# Patient Record
Sex: FEMALE | Race: BLACK OR AFRICAN AMERICAN | NOT HISPANIC OR LATINO | URBAN - METROPOLITAN AREA
[De-identification: names, ages, dates, MRNs, and addresses within clinical notes are randomized per-mention and may not be internally consistent; named-entity substitution may affect disease eponyms.]

---

## 2017-01-24 ENCOUNTER — OUTPATIENT (OUTPATIENT)
Dept: OUTPATIENT SERVICES | Facility: HOSPITAL | Age: 27
LOS: 1 days | Discharge: HOME | End: 2017-01-24

## 2017-01-30 ENCOUNTER — APPOINTMENT (OUTPATIENT)
Dept: GASTROENTEROLOGY | Facility: CLINIC | Age: 27
End: 2017-01-30

## 2017-06-27 DIAGNOSIS — R13.12 DYSPHAGIA, OROPHARYNGEAL PHASE: ICD-10-CM

## 2017-07-31 ENCOUNTER — OUTPATIENT (OUTPATIENT)
Dept: OUTPATIENT SERVICES | Facility: HOSPITAL | Age: 27
LOS: 1 days | Discharge: HOME | End: 2017-07-31

## 2017-07-31 DIAGNOSIS — Z79.899 OTHER LONG TERM (CURRENT) DRUG THERAPY: ICD-10-CM

## 2017-07-31 DIAGNOSIS — E55.9 VITAMIN D DEFICIENCY, UNSPECIFIED: ICD-10-CM

## 2017-08-04 ENCOUNTER — OUTPATIENT (OUTPATIENT)
Dept: OUTPATIENT SERVICES | Facility: HOSPITAL | Age: 27
LOS: 1 days | Discharge: HOME | End: 2017-08-04

## 2017-10-20 ENCOUNTER — OUTPATIENT (OUTPATIENT)
Dept: OUTPATIENT SERVICES | Facility: HOSPITAL | Age: 27
LOS: 1 days | Discharge: HOME | End: 2017-10-20

## 2017-10-20 DIAGNOSIS — E55.9 VITAMIN D DEFICIENCY, UNSPECIFIED: ICD-10-CM

## 2017-10-20 DIAGNOSIS — Z79.899 OTHER LONG TERM (CURRENT) DRUG THERAPY: ICD-10-CM

## 2017-11-02 ENCOUNTER — EMERGENCY (EMERGENCY)
Facility: HOSPITAL | Age: 27
LOS: 0 days | Discharge: HOME | End: 2017-11-02
Admitting: INTERNAL MEDICINE

## 2017-11-02 DIAGNOSIS — S00.83XA CONTUSION OF OTHER PART OF HEAD, INITIAL ENCOUNTER: ICD-10-CM
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
## 2017-11-09 DIAGNOSIS — S00.83XA CONTUSION OF OTHER PART OF HEAD, INITIAL ENCOUNTER: ICD-10-CM

## 2017-11-09 DIAGNOSIS — Z79.899 OTHER LONG TERM (CURRENT) DRUG THERAPY: ICD-10-CM
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
## 2017-11-09 DIAGNOSIS — R47.01 APHASIA: ICD-10-CM

## 2018-01-11 ENCOUNTER — OUTPATIENT (OUTPATIENT)
Dept: OUTPATIENT SERVICES | Facility: HOSPITAL | Age: 28
LOS: 1 days | Discharge: HOME | End: 2018-01-11

## 2018-01-11 DIAGNOSIS — N39.0 URINARY TRACT INFECTION, SITE NOT SPECIFIED: ICD-10-CM

## 2018-01-11 DIAGNOSIS — Z79.899 OTHER LONG TERM (CURRENT) DRUG THERAPY: ICD-10-CM

## 2018-01-11 DIAGNOSIS — E55.9 VITAMIN D DEFICIENCY, UNSPECIFIED: ICD-10-CM

## 2018-01-11 DIAGNOSIS — Z13.220 ENCOUNTER FOR SCREENING FOR LIPOID DISORDERS: ICD-10-CM

## 2018-01-17 ENCOUNTER — OUTPATIENT (OUTPATIENT)
Dept: OUTPATIENT SERVICES | Facility: HOSPITAL | Age: 28
LOS: 1 days | Discharge: HOME | End: 2018-01-17

## 2018-01-17 DIAGNOSIS — N39.0 URINARY TRACT INFECTION, SITE NOT SPECIFIED: ICD-10-CM

## 2018-01-18 ENCOUNTER — OUTPATIENT (OUTPATIENT)
Dept: OUTPATIENT SERVICES | Facility: HOSPITAL | Age: 28
LOS: 1 days | Discharge: HOME | End: 2018-01-18

## 2018-01-18 DIAGNOSIS — R79.89 OTHER SPECIFIED ABNORMAL FINDINGS OF BLOOD CHEMISTRY: ICD-10-CM

## 2018-01-28 ENCOUNTER — OUTPATIENT (OUTPATIENT)
Dept: OUTPATIENT SERVICES | Facility: HOSPITAL | Age: 28
LOS: 1 days | Discharge: HOME | End: 2018-01-28

## 2018-01-28 DIAGNOSIS — Z79.899 OTHER LONG TERM (CURRENT) DRUG THERAPY: ICD-10-CM

## 2018-01-28 DIAGNOSIS — Z13.29 ENCOUNTER FOR SCREENING FOR OTHER SUSPECTED ENDOCRINE DISORDER: ICD-10-CM

## 2018-04-13 ENCOUNTER — APPOINTMENT (OUTPATIENT)
Dept: OTOLARYNGOLOGY | Facility: CLINIC | Age: 28
End: 2018-04-13
Payer: MEDICAID

## 2018-04-13 VITALS — SYSTOLIC BLOOD PRESSURE: 133 MMHG | DIASTOLIC BLOOD PRESSURE: 85 MMHG

## 2018-04-13 DIAGNOSIS — Q18.1 PREAURICULAR SINUS AND CYST: ICD-10-CM

## 2018-04-13 DIAGNOSIS — F98.4 STEREOTYPED MOVEMENT DISORDERS: ICD-10-CM

## 2018-04-13 DIAGNOSIS — H54.7 UNSPECIFIED VISUAL LOSS: ICD-10-CM

## 2018-04-13 DIAGNOSIS — E22.9 HYPERFUNCTION OF PITUITARY GLAND, UNSPECIFIED: ICD-10-CM

## 2018-04-13 PROCEDURE — 99203 OFFICE O/P NEW LOW 30 MIN: CPT

## 2018-05-16 ENCOUNTER — OUTPATIENT (OUTPATIENT)
Dept: OUTPATIENT SERVICES | Facility: HOSPITAL | Age: 28
LOS: 1 days | Discharge: HOME | End: 2018-05-16

## 2018-11-30 ENCOUNTER — OUTPATIENT (OUTPATIENT)
Dept: OUTPATIENT SERVICES | Facility: HOSPITAL | Age: 28
LOS: 1 days | Discharge: HOME | End: 2018-11-30

## 2018-11-30 DIAGNOSIS — Z79.899 OTHER LONG TERM (CURRENT) DRUG THERAPY: ICD-10-CM

## 2018-11-30 DIAGNOSIS — E55.9 VITAMIN D DEFICIENCY, UNSPECIFIED: ICD-10-CM

## 2019-01-22 ENCOUNTER — OUTPATIENT (OUTPATIENT)
Dept: OUTPATIENT SERVICES | Facility: HOSPITAL | Age: 29
LOS: 1 days | Discharge: HOME | End: 2019-01-22

## 2019-01-24 DIAGNOSIS — Z01.20 ENCOUNTER FOR DENTAL EXAMINATION AND CLEANING WITHOUT ABNORMAL FINDINGS: ICD-10-CM

## 2019-02-17 ENCOUNTER — EMERGENCY (EMERGENCY)
Facility: HOSPITAL | Age: 29
LOS: 0 days | Discharge: HOME | End: 2019-02-17
Attending: EMERGENCY MEDICINE | Admitting: EMERGENCY MEDICINE

## 2019-02-17 VITALS
RESPIRATION RATE: 16 BRPM | DIASTOLIC BLOOD PRESSURE: 60 MMHG | TEMPERATURE: 98 F | HEART RATE: 70 BPM | SYSTOLIC BLOOD PRESSURE: 100 MMHG | OXYGEN SATURATION: 100 %

## 2019-02-17 VITALS
DIASTOLIC BLOOD PRESSURE: 51 MMHG | HEART RATE: 68 BPM | OXYGEN SATURATION: 100 % | WEIGHT: 119.05 LBS | RESPIRATION RATE: 16 BRPM | SYSTOLIC BLOOD PRESSURE: 96 MMHG | HEIGHT: 62 IN | TEMPERATURE: 98 F

## 2019-02-17 DIAGNOSIS — K46.9 UNSPECIFIED ABDOMINAL HERNIA WITHOUT OBSTRUCTION OR GANGRENE: ICD-10-CM

## 2019-02-17 DIAGNOSIS — R19.8 OTHER SPECIFIED SYMPTOMS AND SIGNS INVOLVING THE DIGESTIVE SYSTEM AND ABDOMEN: ICD-10-CM

## 2019-02-17 NOTE — ED PROVIDER NOTE - NS ED ROS FT
Except as documented in HPI, all other ROS negative.   Hx obtained from staff member at bedside.   GENERAL: Denies fever/chills, loss of appetite/weight or fatigue.  SKIN: Denies rashes, abrasions, lacerations, ecchymosis, erythema, or edema.  CARDIAC: Denies chest pain, palpitations, or SOB.   RESPIRATORY: Denies SOB, cough, hemoptysis or wheezing.   GI: + stool coming from vagina.   : Denies hematuria, dysuria or frequency.

## 2019-02-17 NOTE — ED PROVIDER NOTE - CARE PROVIDER_API CALL
Rod Morejon)  ColonRectal Surgery  [address] [place] [address], [address] [place] [address]  Phone: [phone]  Fax: [phone]  Follow Up Time: 1-3 Days

## 2019-02-17 NOTE — ED PROVIDER NOTE - OBJECTIVE STATEMENT
Pt is a 29 y/o Female, PMHX of MR, developmental delay, seizures, presents to ED sent in from group home accompanied by aide for evaluation of stool coming from the vagina. As per staff, they just noticed it prior to coming in when they were changing and cleaning her. They wiped, and noticed stool in the vagina. No fevers, abdominal surgeries or n/v or change in appetite.

## 2019-02-17 NOTE — ED PROVIDER NOTE - CLINICAL SUMMARY MEDICAL DECISION MAKING FREE TEXT BOX
pt with possible rectovaginal fistula, exam limited by copious soft brown stool in diaper, pt at baseline, abd +bs, snt/nd, rectal temp 98.8, has stool rectal vault wtihout masses, no palpable defect in vaginal vault though again exam difficult as feces everywhere, will d/c to f/u with surgery as outpatient for further workup. Group home counseled regarding conditions which should prompt return.

## 2019-02-17 NOTE — ED ADULT NURSE NOTE - NSIMPLEMENTINTERV_GEN_ALL_ED
Implemented All Universal Safety Interventions:  [place] to call system. Call bell, personal items and telephone within reach. Instruct patient to call for assistance. Room bathroom lighting operational. Non-slip footwear when patient is off stretcher. Physically safe environment: no spills, clutter or unnecessary equipment. Stretcher in lowest position, wheels locked, appropriate side rails in place.

## 2019-02-17 NOTE — ED PROVIDER NOTE - NSFOLLOWUPCLINICS_GEN_ALL_ED_FT
[facility]  Surgery  [address] [place] [address]  Phone: [phone]  Fax:   Follow Up Time: 1-3 Days

## 2019-02-17 NOTE — ED PROVIDER NOTE - PHYSICAL EXAMINATION
VITAL SIGNS: I have reviewed the initial vital signs.   CONSTITUTIONAL: Awake, alert. Well-developed; well-nourished; in no distress. Non-toxic appearing.   SKIN: No rash, vesicles/lesion, abrasions or lacerations. No ecchymosis or signs of trauma.   HEAD: Normocephalic; atraumatic.   CARD: No chest wall deformity or tenderness. S1, S2 normal; no murmurs, gallops, or rubs. Regular rate and rhythm.  RESP: Good air movement. Lungs CTAB. No crackles, wheezes, rales or rhonchi.  ABD: Soft; non-distended; non-tender. + hernia. Soft stool in diaper/on skin of rectum. No rebound/guarding/rigidity.

## 2019-02-17 NOTE — ED PROVIDER NOTE - PROGRESS NOTE DETAILS
Discussed with nurse need for surgical outpatient f/u to r/o rectovaginal fistula. verbalized, understanding, pt is able to make doctor's appointment. Will provide surgical info for f/u.

## 2019-02-19 ENCOUNTER — CHART COPY (OUTPATIENT)
Age: 29
End: 2019-02-19

## 2019-03-26 ENCOUNTER — APPOINTMENT (OUTPATIENT)
Dept: SURGERY | Facility: CLINIC | Age: 29
End: 2019-03-26

## 2019-03-26 ENCOUNTER — APPOINTMENT (OUTPATIENT)
Dept: SURGERY | Facility: CLINIC | Age: 29
End: 2019-03-26
Payer: MEDICAID

## 2019-03-26 VITALS
WEIGHT: 110 LBS | DIASTOLIC BLOOD PRESSURE: 64 MMHG | BODY MASS INDEX: 20.24 KG/M2 | SYSTOLIC BLOOD PRESSURE: 102 MMHG | HEIGHT: 62 IN

## 2019-03-26 DIAGNOSIS — N82.3 FISTULA OF VAGINA TO LARGE INTESTINE: ICD-10-CM

## 2019-03-26 PROCEDURE — 99203 OFFICE O/P NEW LOW 30 MIN: CPT

## 2019-05-06 PROBLEM — N82.3 RECTOVAGINAL FISTULA: Status: ACTIVE | Noted: 2019-05-06

## 2019-05-06 NOTE — HISTORY OF PRESENT ILLNESS
[FreeTextEntry1] : This is an initial patient visit for Ms. HERNANDEZ who is a nonverbal resident of a nursing care facility. She was seen and evaluated in the ED after someone at the nursing care facility noted the patient have a single episode of stool per vagina. The patient had no other symptoms, had no history of antecedent UTI or vaginal infection.

## 2019-05-06 NOTE — PHYSICAL EXAM
[Agitated] : agitated [Alert] : not alert [Oriented to Person] : disoriented to person [Oriented to Place] : disoriented to place [Oriented to Time] : disoriented to time [de-identified] : Soft, Non-tender, Non-distended, no guarding or rebound. [de-identified] : Healthy female, NAD [de-identified] : Wheelchair bound

## 2019-05-06 NOTE — ASSESSMENT
[FreeTextEntry1] : Ms. HERNANDEZ had one report of stool per vagina. She has no other symptoms or history. I doubt that she has a rectovaginal fistula at this time. I will hold off on and advanced to a workup for now. If she has any further episodes of stool per vagina I will embark on a advance work up which will require likely in-hospital evaluation since she is nonverbal and will require a fair amount of sedation for any testing.

## 2019-06-04 ENCOUNTER — EMERGENCY (EMERGENCY)
Facility: HOSPITAL | Age: 29
LOS: 0 days | Discharge: HOME | End: 2019-06-04
Attending: EMERGENCY MEDICINE | Admitting: EMERGENCY MEDICINE
Payer: MEDICAID

## 2019-06-04 VITALS
TEMPERATURE: 98 F | DIASTOLIC BLOOD PRESSURE: 53 MMHG | SYSTOLIC BLOOD PRESSURE: 89 MMHG | HEART RATE: 68 BPM | OXYGEN SATURATION: 99 % | RESPIRATION RATE: 18 BRPM

## 2019-06-04 VITALS
SYSTOLIC BLOOD PRESSURE: 95 MMHG | RESPIRATION RATE: 18 BRPM | DIASTOLIC BLOOD PRESSURE: 58 MMHG | HEART RATE: 62 BPM | TEMPERATURE: 98 F

## 2019-06-04 DIAGNOSIS — R56.9 UNSPECIFIED CONVULSIONS: ICD-10-CM

## 2019-06-04 DIAGNOSIS — G40.909 EPILEPSY, UNSPECIFIED, NOT INTRACTABLE, WITHOUT STATUS EPILEPTICUS: ICD-10-CM

## 2019-06-04 LAB
ALBUMIN SERPL ELPH-MCNC: 3.7 G/DL — SIGNIFICANT CHANGE UP (ref 3.5–5.2)
ALP SERPL-CCNC: 48 U/L — SIGNIFICANT CHANGE UP (ref 30–115)
ALT FLD-CCNC: 18 U/L — SIGNIFICANT CHANGE UP (ref 0–41)
ANION GAP SERPL CALC-SCNC: 12 MMOL/L — SIGNIFICANT CHANGE UP (ref 7–14)
AST SERPL-CCNC: 14 U/L — SIGNIFICANT CHANGE UP (ref 0–41)
BASOPHILS # BLD AUTO: 0.09 K/UL — SIGNIFICANT CHANGE UP (ref 0–0.2)
BASOPHILS NFR BLD AUTO: 0.9 % — SIGNIFICANT CHANGE UP (ref 0–1)
BILIRUB SERPL-MCNC: 0.3 MG/DL — SIGNIFICANT CHANGE UP (ref 0.2–1.2)
BUN SERPL-MCNC: 10 MG/DL — SIGNIFICANT CHANGE UP (ref 10–20)
CALCIUM SERPL-MCNC: 9 MG/DL — SIGNIFICANT CHANGE UP (ref 8.5–10.1)
CHLORIDE SERPL-SCNC: 107 MMOL/L — SIGNIFICANT CHANGE UP (ref 98–110)
CO2 SERPL-SCNC: 19 MMOL/L — SIGNIFICANT CHANGE UP (ref 17–32)
CREAT SERPL-MCNC: 0.6 MG/DL — LOW (ref 0.7–1.5)
EOSINOPHIL # BLD AUTO: 0.25 K/UL — SIGNIFICANT CHANGE UP (ref 0–0.7)
EOSINOPHIL NFR BLD AUTO: 2.6 % — SIGNIFICANT CHANGE UP (ref 0–8)
GLUCOSE SERPL-MCNC: 90 MG/DL — SIGNIFICANT CHANGE UP (ref 70–99)
HCT VFR BLD CALC: 34.2 % — LOW (ref 37–47)
HGB BLD-MCNC: 11.2 G/DL — LOW (ref 12–16)
IMM GRANULOCYTES NFR BLD AUTO: 0.4 % — HIGH (ref 0.1–0.3)
LYMPHOCYTES # BLD AUTO: 3.1 K/UL — SIGNIFICANT CHANGE UP (ref 1.2–3.4)
LYMPHOCYTES # BLD AUTO: 32.4 % — SIGNIFICANT CHANGE UP (ref 20.5–51.1)
MCHC RBC-ENTMCNC: 27.7 PG — SIGNIFICANT CHANGE UP (ref 27–31)
MCHC RBC-ENTMCNC: 32.7 G/DL — SIGNIFICANT CHANGE UP (ref 32–37)
MCV RBC AUTO: 84.4 FL — SIGNIFICANT CHANGE UP (ref 81–99)
MONOCYTES # BLD AUTO: 1.27 K/UL — HIGH (ref 0.1–0.6)
MONOCYTES NFR BLD AUTO: 13.3 % — HIGH (ref 1.7–9.3)
NEUTROPHILS # BLD AUTO: 4.82 K/UL — SIGNIFICANT CHANGE UP (ref 1.4–6.5)
NEUTROPHILS NFR BLD AUTO: 50.4 % — SIGNIFICANT CHANGE UP (ref 42.2–75.2)
NRBC # BLD: 0 /100 WBCS — SIGNIFICANT CHANGE UP (ref 0–0)
PLATELET # BLD AUTO: 279 K/UL — SIGNIFICANT CHANGE UP (ref 130–400)
POTASSIUM SERPL-MCNC: 4.6 MMOL/L — SIGNIFICANT CHANGE UP (ref 3.5–5)
POTASSIUM SERPL-SCNC: 4.6 MMOL/L — SIGNIFICANT CHANGE UP (ref 3.5–5)
PROT SERPL-MCNC: 6.5 G/DL — SIGNIFICANT CHANGE UP (ref 6–8)
RBC # BLD: 4.05 M/UL — LOW (ref 4.2–5.4)
RBC # FLD: 12.4 % — SIGNIFICANT CHANGE UP (ref 11.5–14.5)
SODIUM SERPL-SCNC: 138 MMOL/L — SIGNIFICANT CHANGE UP (ref 135–146)
WBC # BLD: 9.57 K/UL — SIGNIFICANT CHANGE UP (ref 4.8–10.8)
WBC # FLD AUTO: 9.57 K/UL — SIGNIFICANT CHANGE UP (ref 4.8–10.8)

## 2019-06-04 PROCEDURE — 99284 EMERGENCY DEPT VISIT MOD MDM: CPT

## 2019-06-04 NOTE — ED PROVIDER NOTE - ATTENDING CONTRIBUTION TO CARE
28 y/o female with h/o MR, seizures, non-verbal, sent to ER from living facility for eval of seizures.  Per aide, pt had arm shaking and was yelling - has frequent similar episodes as well as behavioural outburst, today was more than usual and she bit her lip so was sent to ER for eval. No recent illness, no f/c.  Per aide, pt currently at her baseline.  PE - nad, eomi, superficial lac to upper mid lip, no c-spine tenderness, cta b/l, no w/r/r, rrr, abd- soft, nt/nd, flexion contractures, no tenderness, awake, non-verbal.

## 2019-06-04 NOTE — ED PROVIDER NOTE - CARE PROVIDER_API CALL
Joan Fernandez (MD)  Medicine  [address] [place] [id number]  Phone: [phone]  Fax: [phone]  Follow Up Time: 1-3 Days

## 2019-06-04 NOTE — ED PROVIDER NOTE - OBJECTIVE STATEMENT
29y F w PMH severe MR, nonverbal, and seizure disorder on keppra BIBEMS after multiple witnessed episodes of shaking earlier today. Pt has behavioral outbursts and has a seizure history. 29y F w PMH severe MR, nonverbal, and seizure disorder on keppra BIBEMS after multiple witnessed episodes of shaking earlier today. Pt has behavioral outbursts and has a seizure history. Per the aide at the bedside, pt has similar outbursts every day, but had more today than usual. Episodes were each 5-10 seconds long, associated with yelling and arm shaking. Pt has since returned to baseline. Per the aide, there was another episode in the waiting room during which the patient still responded to the aide.

## 2019-06-04 NOTE — ED ADULT TRIAGE NOTE - CHIEF COMPLAINT QUOTE
as per family member, patient had 2 episode of seizure activity, has hx of seizure, no acute distress at this time, response to voice stimuli.

## 2019-06-04 NOTE — ED PROVIDER NOTE - PENDING LAB RAD OPT OUT
Exclude Pending Lab and Radiology orders from printing on the Patient's Discharge Instructions, due to Privacy Concerns. positive S2/positive S1

## 2019-06-04 NOTE — ED ADULT NURSE NOTE - OBJECTIVE STATEMENT
As per family member patient has 2 seizure episodes this am. Patient has hx of seizures and MR. Nonverbal at baseline.

## 2019-06-04 NOTE — ED PROVIDER NOTE - CLINICAL SUMMARY MEDICAL DECISION MAKING FREE TEXT BOX
Pt with h/o MR/seizures, sent to ER for eval of seizure at living facility. pt non-verbal, at baseline MS per aide.   pt on keppra.  labs ok.  no seizure activity while in ER.  TO d/c back to living facility, and pt to f/u with pmd, Dr. Franco.

## 2019-06-04 NOTE — ED PROVIDER NOTE - PHYSICAL EXAMINATION
Constitutional: Chronically ill with flexion contractures.   Head: Atraumatic.  Eyes: EOMI without discomfort.   ENT: No nasal discharge. Mucous membranes moist. Superficial laceration to upper, inner lip.   Neck: Supple. Painless ROM.  Cardiovascular: Regular rhythm. Regular rate. Normal S1 and S2. No murmurs. 2+ pulses in all extremities.   Pulmonary: Normal respiratory rate and effort. Lungs clear to auscultation bilaterally. No wheezing, rales, or rhonchi. Bilateral, equal lung expansion.   Abdominal: Soft. Nondistended. Nontender.   Extremities: Pelvis stable. Moving all extremities.   Skin: No rashes.   Neuro: Awake and alert. Interactive.   Psych: Severe MR nonverbal.

## 2019-10-17 ENCOUNTER — OUTPATIENT (OUTPATIENT)
Dept: OUTPATIENT SERVICES | Facility: HOSPITAL | Age: 29
LOS: 1 days | Discharge: HOME | End: 2019-10-17

## 2019-10-18 DIAGNOSIS — Z13.29 ENCOUNTER FOR SCREENING FOR OTHER SUSPECTED ENDOCRINE DISORDER: ICD-10-CM

## 2019-10-18 DIAGNOSIS — Z79.899 OTHER LONG TERM (CURRENT) DRUG THERAPY: ICD-10-CM

## 2019-10-18 DIAGNOSIS — E03.9 HYPOTHYROIDISM, UNSPECIFIED: ICD-10-CM

## 2019-10-18 DIAGNOSIS — D50.9 IRON DEFICIENCY ANEMIA, UNSPECIFIED: ICD-10-CM

## 2019-10-18 DIAGNOSIS — D51.9 VITAMIN B12 DEFICIENCY ANEMIA, UNSPECIFIED: ICD-10-CM

## 2019-10-18 DIAGNOSIS — E55.9 VITAMIN D DEFICIENCY, UNSPECIFIED: ICD-10-CM

## 2019-10-18 PROBLEM — R56.9 UNSPECIFIED CONVULSIONS: Chronic | Status: ACTIVE | Noted: 2019-06-04

## 2019-10-22 ENCOUNTER — FORM ENCOUNTER (OUTPATIENT)
Age: 29
End: 2019-10-22

## 2019-10-23 ENCOUNTER — OUTPATIENT (OUTPATIENT)
Dept: OUTPATIENT SERVICES | Facility: HOSPITAL | Age: 29
LOS: 1 days | Discharge: HOME | End: 2019-10-23
Payer: MEDICAID

## 2019-10-23 DIAGNOSIS — S02.2XXA FRACTURE OF NASAL BONES, INITIAL ENCOUNTER FOR CLOSED FRACTURE: ICD-10-CM

## 2019-10-23 PROCEDURE — 70160 X-RAY EXAM OF NASAL BONES: CPT | Mod: 26

## 2019-11-22 ENCOUNTER — OUTPATIENT (OUTPATIENT)
Dept: OUTPATIENT SERVICES | Facility: HOSPITAL | Age: 29
LOS: 1 days | Discharge: HOME | End: 2019-11-22

## 2019-11-22 DIAGNOSIS — Z01.20 ENCOUNTER FOR DENTAL EXAMINATION AND CLEANING WITHOUT ABNORMAL FINDINGS: ICD-10-CM

## 2020-02-11 ENCOUNTER — FORM ENCOUNTER (OUTPATIENT)
Age: 30
End: 2020-02-11

## 2020-02-12 ENCOUNTER — OUTPATIENT (OUTPATIENT)
Dept: OUTPATIENT SERVICES | Facility: HOSPITAL | Age: 30
LOS: 1 days | Discharge: HOME | End: 2020-02-12
Payer: MEDICAID

## 2020-02-12 DIAGNOSIS — S09.92XA UNSPECIFIED INJURY OF NOSE, INITIAL ENCOUNTER: ICD-10-CM

## 2020-02-12 PROCEDURE — 70160 X-RAY EXAM OF NASAL BONES: CPT | Mod: 26

## 2020-09-10 ENCOUNTER — OUTPATIENT (OUTPATIENT)
Dept: OUTPATIENT SERVICES | Facility: HOSPITAL | Age: 30
LOS: 1 days | Discharge: HOME | End: 2020-09-10

## 2021-02-25 ENCOUNTER — EMERGENCY (EMERGENCY)
Facility: HOSPITAL | Age: 31
LOS: 0 days | Discharge: HOME | End: 2021-02-25
Attending: STUDENT IN AN ORGANIZED HEALTH CARE EDUCATION/TRAINING PROGRAM | Admitting: STUDENT IN AN ORGANIZED HEALTH CARE EDUCATION/TRAINING PROGRAM
Payer: MEDICAID

## 2021-02-25 VITALS
TEMPERATURE: 97 F | DIASTOLIC BLOOD PRESSURE: 62 MMHG | HEIGHT: 62 IN | HEART RATE: 85 BPM | RESPIRATION RATE: 18 BRPM | SYSTOLIC BLOOD PRESSURE: 118 MMHG | OXYGEN SATURATION: 97 %
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
## 2021-02-25 DIAGNOSIS — R22.0 LOCALIZED SWELLING, MASS AND LUMP, HEAD: ICD-10-CM

## 2021-02-25 DIAGNOSIS — R56.9 UNSPECIFIED CONVULSIONS: ICD-10-CM
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
## 2021-02-25 DIAGNOSIS — S00.83XA CONTUSION OF OTHER PART OF HEAD, INITIAL ENCOUNTER: ICD-10-CM
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
## 2021-02-25 PROCEDURE — 99284 EMERGENCY DEPT VISIT MOD MDM: CPT

## 2021-02-25 PROCEDURE — 70450 CT HEAD/BRAIN W/O DYE: CPT | Mod: 26

## 2021-02-25 NOTE — ED PEDIATRIC NURSE NOTE - OBJECTIVE STATEMENT
pt brought in by staff for "bump on forehead" as per staff she "rocks back and forth may have hit her head on the wall, shes acting normal, eating and drinking, but they made us bring her in just to be evaluated because facility is strict"

## 2021-02-25 NOTE — ED ADULT TRIAGE NOTE - CHIEF COMPLAINT QUOTE
pt from group home with special needs. morning staff noticed a "bump" on her forehead. unknown if she fell out of bed or banged her head on something. pt is acting like her usual self as per aid

## 2021-02-25 NOTE — ED PROVIDER NOTE - CLINICAL SUMMARY MEDICAL DECISION MAKING FREE TEXT BOX
no significant traumatic findings on imaging. pt at baseline throughout ED stay. will dc, rec cont outpt f/u

## 2021-02-25 NOTE — ED PROVIDER NOTE - CARE PROVIDER_API CALL
Joan Fernandez (MD)  Medicine  [address] [place] [address]  Phone: [phone]  Fax: [phone]  Follow Up Time: 1-3 Days

## 2021-02-25 NOTE — ED PROVIDER NOTE - PATIENT PORTAL LINK FT
You can access the FollowMyHealth Patient Portal offered by [facility] by registering at the following website: [email or web address]. By joining [employer]’s FollowMyHealth portal, you will also be able to view your health information using other applications (apps) compatible with our system.

## 2021-02-25 NOTE — ED PROVIDER NOTE - PHYSICAL EXAMINATION
CONSTITUTIONAL: Well-developed; well-nourished; no acute distress. rhythmically moving head side to side and grinding teeth  SKIN: warm, dry  HEAD: Normocephalic; +3x3cm bump mid forehead. +2x2cm bump to nasal bridge.  EYES: no conjunctival injection. PERRLA. EOMI.   ENT: No nasal discharge; airway clear.  NECK: Supple; non tender.  CARD: S1, S2 normal; no murmurs, gallops, or rubs. Regular rate and rhythm.   RESP: No wheezes, rales or rhonchi.  ABD: soft ntnd. BS+ in all 4 quadrants.  EXT: +upper extremity contracted. No clubbing, cyanosis or edema.   NEURO: No focal neuro deficits. Moving all four extremities.  PSYCH: Cooperative, appropriate.

## 2021-02-25 NOTE — ED PROVIDER NOTE - ATTENDING CONTRIBUTION TO CARE
31 yo f hx MR, nonverbal, seizures  pt lives in a group home. pt was found in bed w/ a bump on her head. pt was normal mental status and baseline behavior. pt was on bed. no known/reported traumas.   at baseline, pt moves her head around a lot and accidentally bumps it from time to time. no AC or antiplt     vss  gen- NAD,   HENT- forehead hematoma  card-rrr  lungs-ctab, no wheezing or rhonchi  abd-sntnd, no guarding or rebound  neuro- contracted upper extremities, moving all 4 extremities, otherwise not cooperative    will monitor in ED, CTH to r/o ICH as pt limited historian

## 2021-02-25 NOTE — ED PROVIDER NOTE - OBJECTIVE STATEMENT
31 yo female, PMHx of MR and seizures, nonverbal, presents with a bump on the forehead. Aids at the group home noticed the bump when she woke up this morning, but she has been acting normal per baseline. She does normally bump her head around. ROS unattainable.

## 2021-03-17 ENCOUNTER — APPOINTMENT (OUTPATIENT)
Dept: NEUROSURGERY | Facility: CLINIC | Age: 31
End: 2021-03-17
Payer: MEDICAID

## 2021-03-17 PROCEDURE — 99241 OFFICE CONSULTATION NEW/ESTAB PATIENT 15 MIN: CPT

## 2021-03-19 NOTE — HISTORY OF PRESENT ILLNESS
[FreeTextEntry1] : contusion on forehead [de-identified] : This is a 30 yrs old DD, non-verbal with hx of seizure who resides in a group home state facility who presents today after being evaluated at [facility] ED on 2/25/21 after banging her head on an object, unwitnessed, according to the group home caretaker who is here on this visit. Patient does not appear to be in any acute distress during visit. She is being followed by a provider for seizures management.\par \par CT scan of the head w/o contrast done on 2/2020 showed No evidence of acute intracranial hemorrhage. Frontal and occipital extracalvarial soft tissue swelling/hematoma with no evidence of underlying calvarial fracture

## 2021-03-19 NOTE — PLAN
[FreeTextEntry1] : CTH reviewed by Dr. Wagner- there is no evidence of hemorrhage or fractures. No surgical intervention required. \par No further imaging is required.

## 2021-03-19 NOTE — REASON FOR VISIT
[New Patient Visit] : a new patient visit [Referred By: _________] : Patient was referred by [facility] [Other: _____] : [unfilled]

## 2021-03-23 ENCOUNTER — APPOINTMENT (OUTPATIENT)
Dept: OTOLARYNGOLOGY | Facility: CLINIC | Age: 31
End: 2021-03-23
Payer: MEDICAID

## 2021-03-23 DIAGNOSIS — S09.92XA UNSPECIFIED INJURY OF NOSE, INITIAL ENCOUNTER: ICD-10-CM

## 2021-03-23 PROCEDURE — 99213 OFFICE O/P EST LOW 20 MIN: CPT

## 2021-03-23 NOTE — DATA REVIEWED
[de-identified] : relevant images and reports personally reviewed by me:\par 2/9/21: pronounced soft tissue swelling over bridge of nose, no evidence of fracture on 2 lateral views of nasal bone xrays

## 2021-03-23 NOTE — PHYSICAL EXAM
[Midline] : trachea located in midline position [Normal] : no rashes [de-identified] : edema and mild erythema over nasal dorsum, no crepitus, no step-offs, no fluctuance

## 2021-03-23 NOTE — ASSESSMENT
[FreeTextEntry1] : - soft tissue swelling secondary to repeated trauma to nasal dorsum, discussed options to try to minimize this\par - augmentin for 1 week for nasal cellulitis\par - recommend soft knee pads\par - continue ice packs to nasal dorsum\par - nasal swelling should resolve once repeated injury stops\par - f/up in 2-3 months

## 2021-03-23 NOTE — HISTORY OF PRESENT ILLNESS
[de-identified] : 27 Year old female comes in the office as a new patient. Patient has a pimple in right ear that drains sometimes. Dried blood was found in right ear recently. Patient currently resides in a group home and was noted there to have intermittent drainage from the area just in front of the right ear. This has been going on for several months. Medical assistant accompanying patient does not know much about her medical history. [FreeTextEntry1] : \par 3/23/21: Patient presents today accompanied by aide due to self injuring behavior. More aggressive due to medication changes.  Developed nasal swelling after epispde in January. Subsequently treated foi cellulitis of nasal area with abx. \par  Another self injuring episode occurred February 8, 2021.Xray revealing soft tissue swelling over the bridge of nose which did not reveal fracture.. Patient has no nasal discharge or bleeding. There is no fever or reported signs of discomfort.

## 2021-05-12 ENCOUNTER — OUTPATIENT (OUTPATIENT)
Dept: OUTPATIENT SERVICES | Facility: HOSPITAL | Age: 31
LOS: 1 days | Discharge: HOME | End: 2021-05-12

## 2021-05-13 DIAGNOSIS — Z01.21 ENCOUNTER FOR DENTAL EXAMINATION AND CLEANING WITH ABNORMAL FINDINGS: ICD-10-CM

## 2021-05-20 ENCOUNTER — APPOINTMENT (OUTPATIENT)
Dept: OTOLARYNGOLOGY | Facility: CLINIC | Age: 31
End: 2021-05-20
Payer: MEDICAID

## 2021-05-20 DIAGNOSIS — S00.83XA CONTUSION OF OTHER PART OF HEAD, INITIAL ENCOUNTER: ICD-10-CM

## 2021-05-20 DIAGNOSIS — J34.0 ABSCESS, FURUNCLE AND CARBUNCLE OF NOSE: ICD-10-CM

## 2021-05-20 PROCEDURE — 99212 OFFICE O/P EST SF 10 MIN: CPT

## 2021-05-20 NOTE — REASON FOR VISIT
[Subsequent Evaluation] : a subsequent evaluation for [FreeTextEntry2] : self inflicted nasal trauma

## 2021-05-20 NOTE — PHYSICAL EXAM
[de-identified] : edema and mild erythema over nasal dorsum, no crepitus, no step-offs, no fluctuance [Midline] : trachea located in midline position [Normal] : no rashes

## 2021-05-20 NOTE — ASSESSMENT
[FreeTextEntry1] : - recommend softer knee pads that what she currently has. \par - ice packs to nasal dorsum\par - no evidence of nasal bone fractures on recent nasal xray\par - continue behavior modification for self-inflicted nasal trauma

## 2021-05-20 NOTE — DATA REVIEWED
[de-identified] : relevant images and reports personally reviewed by me:\par xr nasal bones 2/9/21 - no fractures

## 2021-05-20 NOTE — HISTORY OF PRESENT ILLNESS
[de-identified] : 27 Year old female comes in the office as a new patient. Patient has a pimple in right ear that drains sometimes. Dried blood was found in right ear recently. Patient currently resides in a group home and was noted there to have intermittent drainage from the area just in front of the right ear. This has been going on for several months. Medical assistant accompanying patient does not know much about her medical history.\par \par \par 3/23/21: Patient presents today accompanied by aide due to self injuring behavior. More aggressive due to medication changes.  Developed nasal swelling after epispde in January. Subsequently treated foi cellulitis of nasal area with abx. \par  Another self injuring episode occurred February 8, 2021.Xray revealing soft tissue swelling over the bridge of nose which did not reveal fracture.. Patient has no nasal discharge or bleeding. There is no fever or reported signs of discomfort.  [FreeTextEntry1] : \par 5/20/21: Patient presents today accompanied by aide due to nasal bridge swelling and nasal cellulitis. Reports no fever or drainage from nose. No bleeding noted.  antibiotics were finished in March 2021. Still continues with self destructive behavior such as punching face. knee pads are being worn daily, started recently. Had an x-ray nasal bones done Feb 2021, no fracture.

## 2021-05-27 ENCOUNTER — OUTPATIENT (OUTPATIENT)
Dept: OUTPATIENT SERVICES | Facility: HOSPITAL | Age: 31
LOS: 1 days | Discharge: HOME | End: 2021-05-27

## 2021-05-27 DIAGNOSIS — R13.12 DYSPHAGIA, OROPHARYNGEAL PHASE: ICD-10-CM

## 2021-07-10 ENCOUNTER — INPATIENT (INPATIENT)
Facility: HOSPITAL | Age: 31
LOS: 8 days | Discharge: HOME | End: 2021-07-19
Attending: INTERNAL MEDICINE | Admitting: INTERNAL MEDICINE
Payer: MEDICAID

## 2021-07-10 VITALS
RESPIRATION RATE: 16 BRPM | SYSTOLIC BLOOD PRESSURE: 107 MMHG | HEART RATE: 82 BPM | DIASTOLIC BLOOD PRESSURE: 72 MMHG | HEIGHT: 62 IN | OXYGEN SATURATION: 99 % | WEIGHT: 134.92 LBS | TEMPERATURE: 98 F

## 2021-07-10 LAB
ALBUMIN SERPL ELPH-MCNC: 4.2 G/DL — SIGNIFICANT CHANGE UP (ref 3.5–5.2)
ALP SERPL-CCNC: 60 U/L — SIGNIFICANT CHANGE UP (ref 30–115)
ALT FLD-CCNC: 36 U/L — SIGNIFICANT CHANGE UP (ref 0–41)
ANION GAP SERPL CALC-SCNC: 8 MMOL/L — SIGNIFICANT CHANGE UP (ref 7–14)
AST SERPL-CCNC: 30 U/L — SIGNIFICANT CHANGE UP (ref 0–41)
BASOPHILS # BLD AUTO: 0.04 K/UL — SIGNIFICANT CHANGE UP (ref 0–0.2)
BASOPHILS NFR BLD AUTO: 0.3 % — SIGNIFICANT CHANGE UP (ref 0–1)
BILIRUB DIRECT SERPL-MCNC: <0.2 MG/DL — SIGNIFICANT CHANGE UP (ref 0–0.2)
BILIRUB INDIRECT FLD-MCNC: >0.1 MG/DL — LOW (ref 0.2–1.2)
BILIRUB SERPL-MCNC: 0.3 MG/DL — SIGNIFICANT CHANGE UP (ref 0.2–1.2)
BUN SERPL-MCNC: 6 MG/DL — LOW (ref 10–20)
CALCIUM SERPL-MCNC: 9.5 MG/DL — SIGNIFICANT CHANGE UP (ref 8.5–10.1)
CHLORIDE SERPL-SCNC: 104 MMOL/L — SIGNIFICANT CHANGE UP (ref 98–110)
CO2 SERPL-SCNC: 22 MMOL/L — SIGNIFICANT CHANGE UP (ref 17–32)
CREAT SERPL-MCNC: 0.7 MG/DL — SIGNIFICANT CHANGE UP (ref 0.7–1.5)
EOSINOPHIL # BLD AUTO: 0 K/UL — SIGNIFICANT CHANGE UP (ref 0–0.7)
EOSINOPHIL NFR BLD AUTO: 0 % — SIGNIFICANT CHANGE UP (ref 0–8)
GLUCOSE SERPL-MCNC: 109 MG/DL — HIGH (ref 70–99)
HCG SERPL QL: NEGATIVE — SIGNIFICANT CHANGE UP
HCT VFR BLD CALC: 38.5 % — SIGNIFICANT CHANGE UP (ref 37–47)
HGB BLD-MCNC: 12.3 G/DL — SIGNIFICANT CHANGE UP (ref 12–16)
IMM GRANULOCYTES NFR BLD AUTO: 0.6 % — HIGH (ref 0.1–0.3)
LACTATE SERPL-SCNC: 1.4 MMOL/L — SIGNIFICANT CHANGE UP (ref 0.7–2)
LIDOCAIN IGE QN: 17 U/L — SIGNIFICANT CHANGE UP (ref 7–60)
LYMPHOCYTES # BLD AUTO: 1.08 K/UL — LOW (ref 1.2–3.4)
LYMPHOCYTES # BLD AUTO: 8.9 % — LOW (ref 20.5–51.1)
MCHC RBC-ENTMCNC: 27.5 PG — SIGNIFICANT CHANGE UP (ref 27–31)
MCHC RBC-ENTMCNC: 31.9 G/DL — LOW (ref 32–37)
MCV RBC AUTO: 86.1 FL — SIGNIFICANT CHANGE UP (ref 81–99)
MONOCYTES # BLD AUTO: 0.94 K/UL — HIGH (ref 0.1–0.6)
MONOCYTES NFR BLD AUTO: 7.7 % — SIGNIFICANT CHANGE UP (ref 1.7–9.3)
NEUTROPHILS # BLD AUTO: 10.02 K/UL — HIGH (ref 1.4–6.5)
NEUTROPHILS NFR BLD AUTO: 82.5 % — HIGH (ref 42.2–75.2)
NRBC # BLD: 0 /100 WBCS — SIGNIFICANT CHANGE UP (ref 0–0)
PLATELET # BLD AUTO: 295 K/UL — SIGNIFICANT CHANGE UP (ref 130–400)
POTASSIUM SERPL-MCNC: 4.2 MMOL/L — SIGNIFICANT CHANGE UP (ref 3.5–5)
POTASSIUM SERPL-SCNC: 4.2 MMOL/L — SIGNIFICANT CHANGE UP (ref 3.5–5)
PROT SERPL-MCNC: 7.2 G/DL — SIGNIFICANT CHANGE UP (ref 6–8)
RBC # BLD: 4.47 M/UL — SIGNIFICANT CHANGE UP (ref 4.2–5.4)
RBC # FLD: 12.9 % — SIGNIFICANT CHANGE UP (ref 11.5–14.5)
SARS-COV-2 RNA SPEC QL NAA+PROBE: SIGNIFICANT CHANGE UP
SODIUM SERPL-SCNC: 134 MMOL/L — LOW (ref 135–146)
WBC # BLD: 12.15 K/UL — HIGH (ref 4.8–10.8)
WBC # FLD AUTO: 12.15 K/UL — HIGH (ref 4.8–10.8)

## 2021-07-10 PROCEDURE — 70450 CT HEAD/BRAIN W/O DYE: CPT | Mod: 26,MA

## 2021-07-10 PROCEDURE — 99285 EMERGENCY DEPT VISIT HI MDM: CPT

## 2021-07-10 PROCEDURE — 93010 ELECTROCARDIOGRAM REPORT: CPT

## 2021-07-10 PROCEDURE — 74177 CT ABD & PELVIS W/CONTRAST: CPT | Mod: 26,MA

## 2021-07-10 PROCEDURE — 71045 X-RAY EXAM CHEST 1 VIEW: CPT | Mod: 26

## 2021-07-10 PROCEDURE — 76856 US EXAM PELVIC COMPLETE: CPT | Mod: 26

## 2021-07-10 RX ORDER — LANOLIN ALCOHOL/MO/W.PET/CERES
5 CREAM (GRAM) TOPICAL AT BEDTIME
Refills: 0 | Status: DISCONTINUED | OUTPATIENT
Start: 2021-07-10 | End: 2021-07-19

## 2021-07-10 RX ORDER — ENOXAPARIN SODIUM 100 MG/ML
40 INJECTION SUBCUTANEOUS DAILY
Refills: 0 | Status: DISCONTINUED | OUTPATIENT
Start: 2021-07-10 | End: 2021-07-19

## 2021-07-10 RX ORDER — BENZTROPINE MESYLATE 1 MG
0.5 TABLET ORAL EVERY 6 HOURS
Refills: 0 | Status: DISCONTINUED | OUTPATIENT
Start: 2021-07-10 | End: 2021-07-19

## 2021-07-10 RX ORDER — ONDANSETRON 8 MG/1
4 TABLET, FILM COATED ORAL EVERY 4 HOURS
Refills: 0 | Status: DISCONTINUED | OUTPATIENT
Start: 2021-07-10 | End: 2021-07-19

## 2021-07-10 RX ORDER — AZITHROMYCIN 500 MG/1
500 TABLET, FILM COATED ORAL EVERY 24 HOURS
Refills: 0 | Status: DISCONTINUED | OUTPATIENT
Start: 2021-07-11 | End: 2021-07-12

## 2021-07-10 RX ORDER — ACETAMINOPHEN 500 MG
650 TABLET ORAL EVERY 6 HOURS
Refills: 0 | Status: DISCONTINUED | OUTPATIENT
Start: 2021-07-10 | End: 2021-07-19

## 2021-07-10 RX ORDER — SENNA PLUS 8.6 MG/1
2 TABLET ORAL AT BEDTIME
Refills: 0 | Status: DISCONTINUED | OUTPATIENT
Start: 2021-07-10 | End: 2021-07-19

## 2021-07-10 RX ORDER — SODIUM CHLORIDE 9 MG/ML
1000 INJECTION, SOLUTION INTRAVENOUS
Refills: 0 | Status: DISCONTINUED | OUTPATIENT
Start: 2021-07-10 | End: 2021-07-12

## 2021-07-10 RX ORDER — SODIUM CHLORIDE 9 MG/ML
1000 INJECTION INTRAMUSCULAR; INTRAVENOUS; SUBCUTANEOUS ONCE
Refills: 0 | Status: COMPLETED | OUTPATIENT
Start: 2021-07-10 | End: 2021-07-10

## 2021-07-10 RX ORDER — AZITHROMYCIN 500 MG/1
500 TABLET, FILM COATED ORAL ONCE
Refills: 0 | Status: COMPLETED | OUTPATIENT
Start: 2021-07-10 | End: 2021-07-10

## 2021-07-10 RX ORDER — LEVETIRACETAM 250 MG/1
500 TABLET, FILM COATED ORAL
Refills: 0 | Status: DISCONTINUED | OUTPATIENT
Start: 2021-07-10 | End: 2021-07-13

## 2021-07-10 RX ORDER — CEFTRIAXONE 500 MG/1
1000 INJECTION, POWDER, FOR SOLUTION INTRAMUSCULAR; INTRAVENOUS ONCE
Refills: 0 | Status: COMPLETED | OUTPATIENT
Start: 2021-07-10 | End: 2021-07-10

## 2021-07-10 RX ORDER — LAMOTRIGINE 25 MG/1
75 TABLET, ORALLY DISINTEGRATING ORAL
Refills: 0 | Status: DISCONTINUED | OUTPATIENT
Start: 2021-07-10 | End: 2021-07-19

## 2021-07-10 RX ORDER — CEFTRIAXONE 500 MG/1
INJECTION, POWDER, FOR SOLUTION INTRAMUSCULAR; INTRAVENOUS
Refills: 0 | Status: DISCONTINUED | OUTPATIENT
Start: 2021-07-10 | End: 2021-07-12

## 2021-07-10 RX ORDER — POLYETHYLENE GLYCOL 3350 17 G/17G
17 POWDER, FOR SOLUTION ORAL
Refills: 0 | Status: DISCONTINUED | OUTPATIENT
Start: 2021-07-10 | End: 2021-07-19

## 2021-07-10 RX ORDER — CEFTRIAXONE 500 MG/1
1000 INJECTION, POWDER, FOR SOLUTION INTRAMUSCULAR; INTRAVENOUS EVERY 24 HOURS
Refills: 0 | Status: DISCONTINUED | OUTPATIENT
Start: 2021-07-11 | End: 2021-07-12

## 2021-07-10 RX ORDER — DIPHENHYDRAMINE HCL 50 MG
50 CAPSULE ORAL EVERY 6 HOURS
Refills: 0 | Status: DISCONTINUED | OUTPATIENT
Start: 2021-07-10 | End: 2021-07-19

## 2021-07-10 RX ORDER — AZITHROMYCIN 500 MG/1
TABLET, FILM COATED ORAL
Refills: 0 | Status: DISCONTINUED | OUTPATIENT
Start: 2021-07-10 | End: 2021-07-12

## 2021-07-10 RX ADMIN — Medication 0.5 MILLIGRAM(S): at 23:30

## 2021-07-10 RX ADMIN — SODIUM CHLORIDE 100 MILLILITER(S): 9 INJECTION, SOLUTION INTRAVENOUS at 21:50

## 2021-07-10 RX ADMIN — CEFTRIAXONE 100 MILLIGRAM(S): 500 INJECTION, POWDER, FOR SOLUTION INTRAMUSCULAR; INTRAVENOUS at 13:32

## 2021-07-10 RX ADMIN — CEFTRIAXONE 1000 MILLIGRAM(S): 500 INJECTION, POWDER, FOR SOLUTION INTRAMUSCULAR; INTRAVENOUS at 19:39

## 2021-07-10 RX ADMIN — AZITHROMYCIN 255 MILLIGRAM(S): 500 TABLET, FILM COATED ORAL at 13:33

## 2021-07-10 RX ADMIN — SENNA PLUS 2 TABLET(S): 8.6 TABLET ORAL at 21:54

## 2021-07-10 RX ADMIN — Medication 1 MILLIGRAM(S): at 23:49

## 2021-07-10 RX ADMIN — SODIUM CHLORIDE 1000 MILLILITER(S): 9 INJECTION INTRAMUSCULAR; INTRAVENOUS; SUBCUTANEOUS at 14:38

## 2021-07-10 RX ADMIN — AZITHROMYCIN 500 MILLIGRAM(S): 500 TABLET, FILM COATED ORAL at 19:39

## 2021-07-10 NOTE — ED PROVIDER NOTE - ATTENDING CONTRIBUTION TO CARE
31F PMH MR nonverbal sz, from [facility], vaccinated for covid, p/w 2 days of dry cough and 2 episodes nbnb emesis. pt unable to provide hx. aide at bedside states pt appears tired and has dec po but doesn't appear in pain. no fever. no trauma. aide doesn't know why she was started on ativan. doesn't know last sz.     on exam, AFVSS, well omid nad, ncat, eomi, perrla, mmm, lctab, rrr nl s1s2 no mrg, abd soft ntnd, alert, developmental delay, not following commands, no focal deficits, no le edema or calf ttp, +coughing    a/p; Cough, n/v. will do labs, cxr, CTH, CT a/p re-eval

## 2021-07-10 NOTE — ED PROVIDER NOTE - PHYSICAL EXAMINATION
Physical Exam    Vital Signs: I have reviewed the initial vital signs.  Constitutional: well-nourished, appears stated age, no acute distress  Eyes: Conjunctiva pink, Sclera clear,   Cardiovascular: S1 and S2, regular rate, regular rhythm, well-perfused extremities, radial pulses equal and 2+  Respiratory: unlabored respiratory effort, clear to auscultation bilaterally no wheezing, rales and rhonchi  Gastrointestinal: soft, non-tender abdomen, no pulsatile mass, normal bowl sounds  Musculoskeletal: supple neck, no lower extremity edema, no midline tenderness  Integumentary: warm, dry, no rash  Neurologic: awake, alert, responds to tactile stimulus.

## 2021-07-10 NOTE — ED PROVIDER NOTE - OBJECTIVE STATEMENT
32 yo female, pmh of MR, nonverbal, seizure d/o on Keppra and Lamictal, recently started on po ativan, presents to ed for 2 episodes of NBNB vomiting. Per staff pt appears more sleepy today. Noted + cough. No report of fever, diarrhea, ams.

## 2021-07-10 NOTE — ED ADULT NURSE NOTE - OBJECTIVE STATEMENT
31 year old female from [facility] for one episode of vomiting that was witnessed by home health aide, patient had no blood or coffee ground emesis. patient recently started ativan po four times a day. aide was unsure if patient received a dose early this morning. paitient at bedside nonverbal at baseline resting calming. no signs of n/v

## 2021-07-10 NOTE — H&P ADULT - ASSESSMENT
Patient is a 32 y/o female with Pmhx MR non verbal at baseline, seizure disorder presented to the hospital for non-bilious non-bloody vomiting x 1 day. Patient is non verbal at baseline, she is from [facility] provided by group home staff Mr. Jim Simmons. Per group home staff patient was recently started on PO ativan for her seizure disorder. Yesterday after breakfast patient throw up x 2. After pt throw up pt started coughing so today she was brought to the hospital for further evaluation. No fever, chills, diarrhea, seizure like activity or weakness. however per group home aid patient is more sleepy than usual    Patient was found have  aspiration pneumonia and CXR and CT. she also has a incidental finding of adnexal mass on CT scan.    #aspiration pneumonia  -found to have aspiration pneumonia on CXR and CT on admission  -NBNB vomiting x 2  -SIRS negative on admission. afebrile. WBC 12  -f/u blood culture, sputum culture  -speech and swallow eval  -zofran PRN for nausea  -s/p azithromycin and ceftriaxone in the ED  -start ceftriaxone 1g IV q24hr x 5 days & azithromycin x 5 days    #r/o seizure disorder  -patient has a pmhx of seizure disorder, medication regime recently changed  -EEG this admission to r/o seizure  -neuro eval if eeg positive    #Left adnexal mass  -incidental finding  -can be Follow up as an out pt    activity: ambulate as tolerated  diet: NPO until S&S eval  dvt ppx: lvx  gi ppx: non indicated  dispo: acute Patient is a 32 y/o female with Pmhx MR non verbal at baseline, seizure disorder presented to the hospital for non-bilious non-bloody vomiting x 1 day. Patient is non verbal at baseline, she is from [facility] provided by group home staff Mr. Jim Simmons. Per group home staff patient was recently started on PO ativan for her seizure disorder. Yesterday after breakfast patient throw up x 2. After pt throw up pt started coughing so today she was brought to the hospital for further evaluation. No fever, chills, diarrhea, seizure like activity or weakness. however per group home aid patient is more sleepy than usual    head CT negative. Patient was found have  aspiration pneumonia and CXR and CT. she also has a incidental finding of adnexal mass on CT scan.    #aspiration pneumonia  -found to have aspiration pneumonia on CXR and CT on admission  -NBNB vomiting x 2  -SIRS negative on admission. afebrile. WBC 12  -f/u blood culture, sputum culture  -speech and swallow eval  -zofran PRN for nausea  -s/p azithromycin and ceftriaxone in the ED  -start ceftriaxone 1g IV q24hr x 5 days & azithromycin x 5 days    #r/o seizure disorder  -patient has a pmhx of seizure disorder, medication regime recently changed, started on ativan  -resume home meds, medication was confirmed by documents from group home  -EEG this admission to r/o seizure  -neuro eval if eeg positive    #Left adnexal mass  -incidental finding  -can be Follow up as an out pt    activity: ambulate as tolerated  diet: NPO until S&S eval  dvt ppx: lvx  gi ppx: non indicated  dispo: acute Patient is a 30 y/o female with Pmhx MR non verbal at baseline, seizure disorder presented to the hospital for non-bilious non-bloody vomiting x 1 day. Patient is non verbal at baseline, she is from [facility] provided by group home staff Mr. Jim Simmons. Per group home staff patient was recently started on PO ativan for her seizure disorder. Yesterday after breakfast patient throw up x 2. After pt throw up pt started coughing so today she was brought to the hospital for further evaluation. No fever, chills, diarrhea, seizure like activity or weakness. however per group home aid patient is more sleepy than usual    head CT negative. Patient was found have  aspiration pneumonia and CXR and CT. she also has a incidental finding of adnexal mass on CT scan.    #aspiration pneumonia  -found to have aspiration pneumonia on CXR and CT on admission  -NBNB vomiting x 2  -SIRS negative on admission. afebrile. WBC 12  -f/u blood culture, sputum culture  -speech and swallow eval  -zofran PRN for nausea  -s/p azithromycin and ceftriaxone in the ED  -start ceftriaxone 1g IV q24hr & azithromycin 200mg q24hrs    #r/o seizure disorder  -patient has a pmhx of seizure disorder, medication regime recently changed, started on ativan  -resume home meds, medication was confirmed by documents from group home  -EEG this admission to r/o seizure  -neuro eval if eeg positive    #Left adnexal mass  -incidental finding on ct  -f/u pelvic ultrasound  -can be Follow up as an out pt, ob gyn consult prn    activity: ambulate as tolerated  diet: NPO until S&S eval  dvt ppx: lvx  gi ppx: non indicated  dispo: acute Patient is a 30 y/o female with Pmhx MR non verbal at baseline, seizure disorder presented to the hospital for non-bilious non-bloody vomiting x 1 day. Patient is non verbal at baseline, she is from [facility] provided by group home staff Mr. Jim Simmons. Per group home staff patient was recently started on PO ativan for her seizure disorder. Yesterday after breakfast patient throw up x 2. After pt throw up pt started coughing so today she was brought to the hospital for further evaluation. No fever, chills, diarrhea, seizure like activity or weakness. however per group home aid patient is sleepier than usual    head CT negative. Patient was found have  aspiration pneumonia and CXR and CT. she also has a incidental finding of adnexal mass on CT scan.    #aspiration pneumonia  -found to have aspiration pneumonia on CXR and CT on admission  -NBNB vomiting x 2  -SIRS negative on admission. afebrile. WBC 12  -f/u blood culture, sputum culture  -speech and swallow eval  -zofran PRN for nausea  -s/p azithromycin and ceftriaxone in the ED  -start ceftriaxone 1g IV q24hr & azithromycin 200mg q24hrs    #r/o seizure disorder  -patient has a pmhx of seizure disorder, medication regime recently changed, started on ativan  -resume home meds, medication was confirmed by documents from group home  -EEG this admission to r/o seizure  -neuro eval if eeg positive    #Left adnexal mass  -incidental finding on ct  -f/u pelvic ultrasound  -can be Follow up as an out pt, ob gyn consult prn    activity: ambulate as tolerated  diet: NPO until S&S eval  dvt ppx: lvx  gi ppx: non indicated  dispo: acute

## 2021-07-10 NOTE — ED PROVIDER NOTE - CLINICAL SUMMARY MEDICAL DECISION MAKING FREE TEXT BOX
pt found to have pna,  iv abx given, d/w dr shabazz, admit to medicine for further treatment. incidental adnexal mass, can be worked up as inpt or outpt

## 2021-07-10 NOTE — H&P ADULT - NSHPPHYSICALEXAM_GEN_ALL_CORE
GENERAL: NAD, non verbal  HEAD:  Atraumatic, Normocephalic  EYES: EOMI, PERRLA, conjunctiva and sclera clear  NECK: Supple, No JVD  CHEST/LUNG: bilateral crackles  HEART: Regular rate and rhythm; No murmurs, rubs, or gallops  ABDOMEN: Bowel sounds present; Soft, Nontender, Nondistended. No hepatomegally  EXTREMITIES:  2+ Peripheral Pulses, brisk capillary refill. No clubbing, cyanosis, or edema  NERVOUS SYSTEM:  non verbal  MSK: FROM all 4 extremities, full and equal strength

## 2021-07-10 NOTE — ED ADULT TRIAGE NOTE - CHIEF COMPLAINT QUOTE
BIBA from [facility], pt had 1 episode of vomiting yesterday and another episode of vomiting today. pt is baseline nonverbal. as per EMS, pt recently began taking Ativan PO 1 week ago.

## 2021-07-10 NOTE — H&P ADULT - HISTORY OF PRESENT ILLNESS
Patient is a 32 y/o female with Pmhx MR non verbal at baseline, seizure disorder presented to the hospital for non-bilious non-bloody vomiting x 1 day. Patient is non verbal at baseline, she is from [facility] provided by group home staff Albert Jim Simmons. Per group home staff patient was recently started on PO ativan for her seizure disorder. Yesterday after breakfast patient throw up x 2. After pt throw up pt started coughing so today she was brought to the hospital for further evaluation. No fever, chills, diarrhea, seizure like activity or weakness.    Vital Signs (24 Hrs):  T(C): 37 (07-10-21 @ 11:37), Max: 37 (07-10-21 @ 11:37)  HR: 82 (07-10-21 @ 10:43) (82 - 82)  BP: 107/72 (07-10-21 @ 10:43) (107/72 - 107/72)  RR: 16 (07-10-21 @ 10:43) (16 - 16)  SpO2: 99% (07-10-21 @ 10:43) (99% - 99%)   Patient is a 30 y/o female with Pmhx MR non verbal at baseline, seizure disorder presented to the hospital for non-bilious non-bloody vomiting x 1 day. Patient is non verbal at baseline, she is from [facility] provided by group home staff Albert Jim Simmons. Per group home staff patient was recently started on PO ativan for her seizure disorder. Yesterday after breakfast patient throw up x 2. After pt throw up pt started coughing so today she was brought to the hospital for further evaluation. No fever, chills, diarrhea, seizure like activity or weakness.    In ED:  T(C): 37 (07-10-21 @ 11:37), Max: 37 (07-10-21 @ 11:37)  HR: 82 (07-10-21 @ 10:43) (82 - 82)  BP: 107/72 (07-10-21 @ 10:43) (107/72 - 107/72)  RR: 16 (07-10-21 @ 10:43) (16 - 16)  SpO2: 99% (07-10-21 @ 10:43) (99% - 99%)

## 2021-07-10 NOTE — H&P ADULT - NSHPLABSRESULTS_GEN_ALL_CORE
Labs:  CAPILLARY BLOOD GLUCOSE                              12.3   12.15 )-----------( 295      ( 10 Jul 2021 11:30 )             38.5       Auto Neutrophil %: 82.5 % (07-10-21 @ 11:30)  Auto Immature Granulocyte %: 0.6 % (07-10-21 @ 11:30)    07-10    134<L>  |  104  |  6<L>  ----------------------------<  109<H>  4.2   |  22  |  0.7      Calcium, Total Serum: 9.5 mg/dL (07-10-21 @ 11:30)      LFTs:             7.2  | 0.3  | 30       ------------------[60      ( 10 Jul 2021 11:30 )  4.2  | <0.2 | 36          Lipase:17     Amylase:x         Lactate, Blood: 1.4 mmol/L (07-10-21 @ 11:30)      Coags:        < from: 12 Lead ECG (07.10.21 @ 13:48) >    Diagnosis Line Normal sinus rhythm  Nonspecific T wave abnormality  Abnormal ECG    < end of copied text >    `< from: CT Abdomen and Pelvis w/ IV Cont (07.10.21 @ 13:23) >    IMPRESSION:    No bowel obstruction or ascites.    Approximate 4.8 cm x 4.1 x 6.7 cm solid complex right adnexal mass. (Series 6/37). Correlation with pelvic sonogram recommended.    3.4 cm left adnexa. (5/348).    Right middle lobe and scattered bilateral lower lobe opacities.    < end of copied text >    < from: Xray Chest 1 View-PORTABLE IMMEDIATE (Xray Chest 1 View-PORTABLE IMMEDIATE .) (07.10.21 @ 12:04) >    Impression:    Right basilar peribronchial opacity. No pleural effusions or pneumothorax.    < end of copied text >

## 2021-07-10 NOTE — ED ADULT NURSE NOTE - CHIEF COMPLAINT QUOTE
BIBA from [facility], pt had 1 episode of vomiting yesterday and another episode of vomiting today. pt is baseline nonverbal. as per EMS, pt recently began taking Ativan PO 1 week ago.

## 2021-07-10 NOTE — ED PROVIDER NOTE - TOBACCO USE
[facility] INPATIENT ENCOUNTER   [facility] PRACTICE RESIDENT ADMISSION NOTE    ADMISSION DATE:  1/21/2018    ADMITTING PHYSICIAN:  Andrade Hernandez MD  PRIMARY CARE PHYSICIAN: Antonio Chau DO    CODE STATUS:  Full Resuscitation    TEAM:  Tish      CHIEF COMPLAINT:  Abdominal Pain and Constipation      HISTORY OF PRESENT ILLNESS:      Kulwinder Agustin is a 66-year-old male with PMH significant for, but not limited to chronic ischemic cardiomyopathy EF 15% 01/03/18 s/p Biventricular ICD placement, severe aortic stenosis, COPD, chronic atrial fibrillation on anticoagulation previously who presents with abdominal pain for the past 3 weeks. Patient stated he has been having sharp mid abdominal pain that seem to occur with consumption of food. Patient is strongly convinced that abdominal pain is caused by food because he tried to not eat for two days a week a go and his symptoms improved during those times. Patient also endorses occasional watery diarrhea but denied melena or hematochezia. Today: patient seen and examined at bedside. Patient was lying in bed, minimally verbal. Denied nausea/vomiting/chest pain/SOB.     PAST MEDICAL HISTORY:    Past Medical History:   Diagnosis Date   â¢ Acute bronchitis    â¢ Altered mental state 3/3/2014    ER admission - kyperkalemia; electrolyte imbalances   â¢ Aortic stenosis     Moderate to Severe AS   â¢ Atrial fibrillation, chronic (CMS/HCC)    â¢ Blood clot associated with vein wall inflammation 6/2013    Left leg   â¢ Bronchitis    â¢ CAD (coronary artery disease)     s/p CABG and combined pacer/+AICD implant   â¢ Cardiac failure congestive    â¢ Chronic kidney disease, stage III (moderate)    â¢ Chronic pain    â¢ COPD (chronic obstructive pulmonary disease) (CMS/[facility])    â¢ Depression    â¢ Diabetes mellitus (CMS/[facility])     Type 2   â¢ Diverticulosis    â¢ Dyslipidemia    â¢ Essential (primary) hypertension    â¢ Gout    â¢ High cholesterol    â¢ Hyperparathyroidism (CMS/HCC)    â¢ Ischemic cardiomyopathy    â¢ Left kidney mass 1/2014    Had Left Nephrectomy   â¢ Pneumonia 2012   â¢ RAD (reactive airway disease)    â¢ Renal cell carcinoma of left kidney (CMS/HCC)    â¢ Solitary kidney     s/p left nephrectomy for RCC   â¢ Transfusion history 7/2016   â¢ Urinary incontinence    â¢ Urinary tract infection    â¢ Wears dentures     upper   â¢ Wears glasses     reading       SURGICAL HISTORY:    Past Surgical History:   Procedure Laterality Date   â¢ CARDIAC CATHERIZATION  1/10/2014   â¢ CARDIAC SURGERY     â¢ COLONOSCOPY W BIOPSY  6/9/2015    benign polyp 10yr recall    â¢ CORONARY ARTERY BYPASS GRAFT  2008   â¢ EP ICD IMPLANT  2008    Bi Ventricular-Medtronic   â¢ FASCIOTOMY Left 7/16/2016    THIGH COMPARTMENT SYNDROME. Dr Iliana Seals. [facility]   â¢ ICD BI-VENTRIC GENERATOR REPLACE  4/7/2014    [employer]   â¢ NEPHRECTOMY Left 1/16/2014    left- robotic procedure   â¢ VASCULAR SURGERY         FAMILY HISTORY:    Family History   Problem Relation Age of Onset   â¢ Cancer Mother 61     breast cancer   â¢ OTHER Father      blood disease   â¢ Heart disease Brother        SOCIAL HISTORY:    Social History   Substance Use Topics   â¢ Smoking status: Former Smoker     Packs/day: 1.00     Years: 4.00     Types: Cigarettes     Quit date: 1/1/1977   â¢ Smokeless tobacco: Never Used   â¢ Alcohol use No      Comment: rare 1 beer/year     The patient lives alone.     MEDICATIONS:    Current Facility-Administered Medications   Medication Dose Route Frequency Provider Last Rate Last Dose   â¢ enoxaparin (LOVENOX) injection 40 mg  40 mg Subcutaneous 2 times per day Jarrett Parker MD       â¢ sodium chloride (PF) 0.9 % injection 2 mL  2 mL Injection 2 times per day Marty Vásquez DO   2 mL at 01/22/18 1109   â¢ sodium chloride (PF) 0.9 % injection 2 mL  2 mL Injection PRN Marty Vásquez DO       â¢ acetaminophen (TYLENOL) tablet 1,000 mg  1,000 mg Oral BID PRN Concepcion Weber DO   1,000 mg at 01/22/18 0037   â¢ albuterol inhaler 1 puff  1 puff Inhalation Q4H "PRN Baltazar Masters DO       â¢ HYDROcodone-acetaminophen (NORCO) 5-325 MG per tablet 1 tablet  1 tablet Oral Q6H PRN Baltazar Masters DO       â¢ fluticasone-salmeterol (ADVAIR DISKUS) 250-50 MCG/DOSE inhaler 1 puff  1 puff Inhalation BID Resp Concepcion Weber, DO   1 puff at 01/22/18 1109   â¢ finasteride (PROSCAR) tablet 5 mg  5 mg Oral Daily Concepcion Weber, DO   5 mg at 01/22/18 1111   â¢ famotidine (PEPCID) tablet 20 mg  20 mg Oral Nightly Concepcion Weber, DO   20 mg at 01/21/18 2016   â¢ linaclotide (LINZESS) capsule 290 mcg  290 mcg Oral QAM AC Concepcion Weber, DO   290 mcg at 01/22/18 1111   â¢ mirtazapine (REMERON) tablet 30 mg  30 mg Oral Nightly Concepcion Weber, DO   30 mg at 01/21/18 2217   â¢ polyethylene glycol (GLYCOLAX, MIRALAX) packet 17 g  17 g Oral Daily PRN Noemi Weber, DO   17 g at 01/22/18 1117   â¢ senna (SENOKOT) 8.6 mg  1 tablet Oral BID PRN Baltazar Masters DO       â¢ torsemide (DEMADEX) tablet 60 mg  60 mg Oral Daily Concepcion Weber, DO   60 mg at 01/22/18 1111   â¢ pregabalin (LYRICA) capsule 75 mg  75 mg Oral 2 times per day Baltazar Masters DO   75 mg at 01/22/18 1111   â¢ tamsulosin (FLOMAX) capsule 0.4 mg  0.4 mg Oral Daily PC Concepcion Weber, DO   0.4 mg at 01/22/18 1111       ALLERGIES:    ALLERGIES:  No Known Allergies    REVIEW OF SYSTEMS:    Complete review of systems performed and negative except as documented in the History of Present Illness.     PHYSICAL EXAM:    VITAL SIGNS:    Vital Last Value 24 Hour Range   Temperature 97.3 Â°F (36.3 Â°C) (01/22/18 1324) Temp  Min: 97.3 Â°F (36.3 Â°C)  Max: 98.2 Â°F (36.8 Â°C)   Pulse 70 (01/22/18 1324) Pulse  Min: 69  Max: 73   Respiratory 20 (01/22/18 1324) Resp  Min: 20  Max: 24   Non-Invasive  Blood Pressure 126/83 (01/22/18 1324) BP  Min: 120/81  Max: 134/87   Pulse Oximetry 98 % (01/22/18 1324) SpO2  Min: 90 %  Max: 100 %     Vital Admitted   Weight Weight: (!) 149.7 kg (01/21/18 0525)   Height Height: 6' 2"" (188 cm) (01/21/18 0525) " "  BMI BMI (Calculated): 42.46 (01/21/18 0525)     Visit Vitals  /83 (BP Location: [facility], Patient Position: Sitting)   Pulse 70   Temp 97.3 Â°F (36.3 Â°C) (Oral)   Resp 20   Ht 6' 2"" (1.88 m)   Wt (!) 149.5 kg   SpO2 98%   BMI 42.32 kg/mÂ²       General Appearance: Morbidly obese,  Alert, cooperative, no distress, appears stated age. Back:  Symmetric, no curvature, range of motion normal, no costovertebral angle tenderness. Lungs:  Diminished lung sounds b/l, respirations unlabored. Chest wall:  No tenderness or deformity. Heart:  Regular rate and rhythm, S1 and S2 normal, no murmur, rub or gallop. Abdomen:  Obese, Soft, no pain upon palpation, bowel sounds active in all four quadrants, no masses, no organomegaly. Extremities: +1 b/l pitting edema,  atraumatic, no cyanosis   Pulses:  2+ and symmetric all extremities. Skin:  Skin color, texture, turgor normal, no rashes or lesions. LABORATORY DATA:    All pertinent laboratory results were reviewed. IMAGING STUDIES:    CT Abdomen Pelvis without contrast   Final Result   IMPRESSION:      1.  New low volume ascites   2. Slightly hyperattenuating liver parenchyma on this noncontrast study. This raises the suggestion of possible iron overload or prior usage of   amiodarone? Question of passive congestion with prominent IVC/hepatic   veins. 3.  Cholelithiasis and gallbladder sludge without evidence of cholecystitis   4. Unchanged mosaicism and groundglass opacities the lung bases, may   represent pulmonary edema. 5.  Diverticulosis without evidence of diverticulitis. 6.  Status post left nephrectomy. 7.  Right adrenal adenoma and cysts within the right kidney including a   stable to slightly smaller right lower pole hyperdense cyst.      I have reviewed the images and agree with the Resident interpretation.                ASSESSMENT AND PLAN:    Dillon Pak is a 79year old male admitted on 1/2/2018 with past medical history significant for, " but not limited to chronic ischemic cardiomyopathy EF 15% 01/03/18  s/p Biventricular ICD placement, severe aortic stenosis, COPD, chronic atrial fibrillation previously on anticoagulation who presents with abdominal pain x3 weeks. Further evaluation and work up as follows:  Â   #Abdominal Pain: pt complaints of abdominal pain could be related to volume overload. CT abdomen/pelvis negative for acute process other than right adrenal adenoma/cyst and low volume ascites as noted above. Acute/chronic mesenteric ischemia is on Dx but less likely. Spoke with radiologist regarding CT angio abdomen and since pt has elevated Cr in the setting of solitary kidney, along with CT angio only showing clot and not hypoperfusion which pt most likely has, no CT for now.    -Continue to monitor   -Palliative care consult       #  Decompensated heart failure: Pt with a hx of severe aortic stenosis and ischemic cardiomyopathy EF 15% 01/03/18 s/p Biventricular ICD placement. BNP 1008.    -Medications were discontinued on last admission due to comfort measures  -Will await meeting with pallative care before resuming meds   -strict intake/output  -low salt, fluid restricted diet  -daily standing weights     Â   # Chronic elevated troponin: Pt has no complaints of chest pain. First troponin 0.11 , pt has chronically elevated troponins ~0.10.   -nitroglycerin PRN  Â   # COPD: pt complains of SOB on exertion at baseline.  - advair 1 puff BID  -Albuterol inhaler 1 puff q4 hrs  Â   # Chronic Atrial Fibrilation: Pt denies palpitations or lightheadedness. ECG show V-paced rhythm, Nonspecific intraventricular block. Possible right ventricular hypertrophy. Inferior and anterolateral infarct , age undetermined. EKG unchanged from previous admission.   -Warfarin discontinued previously. Will hold off restarting for now.    -Will start prophylaxis Lovenox now     Â   # Chronic Kidney Disease Stage III: Cr 2.02 up from baseline of around 1.5.  -strict intake/output  -low salt, fluid restricted diet  -monitor urinary output  Â   #Chronic Back Pain:   Â -Tylenol 1000 mg q6 hours PRN  -NORCO 5-325 mg q6 hours PRN    # Essential HTN: SBP in 160s on admission, stable now. Pt denies headaches  -Hold PTA meds  Â   # Type 2 Diabetes Mellitus not on outpatient medications: Last A1c in November was 6.7. Pt was taken off Lantus at this time. On admission pt was hyperglycemic at 166  -Hold of meds for now   Â   # Hyperlipidemia  -Hold off restarting meds for now       #Disposition: Awaiting palliative consult and possible referral to SNF with hospice care. The patient's history and physical were discussed with Ryan Malave MD.    Andreina Jackson MD  Family Medicine [facility]   1/22/2018    After 5 p.m. and on weekends, please page . Never smoker

## 2021-07-10 NOTE — PROGRESS NOTE ADULT - SUBJECTIVE AND OBJECTIVE BOX
DAVID JOSS  31y  Female  admitted w intractable vomiting  afebrile  ct findings noted  cxr-? rll PNA??    Patient is a 31y old  Female who presents with a chief complaint of aspiration pneumonia (10 Jul 2021 18:38)    HPI:  Patient is a 32 y/o female with Pmhx MR non verbal at baseline, seizure disorder presented to the hospital for non-bilious non-bloody vomiting x 1 day. Patient is non verbal at baseline, she is from [facility] provided by group home staff Mr. Jim Simmons. Per group home staff patient was recently started on PO ativan for her seizure disorder. Yesterday after breakfast patient throw up x 2. After pt throw up pt started coughing so today she was brought to the hospital for further evaluation. No fever, chills, diarrhea, seizure like activity or weakness.    In ED:  T(C): 37 (07-10-21 @ 11:37), Max: 37 (07-10-21 @ 11:37)  HR: 82 (07-10-21 @ 10:43) (82 - 82)  BP: 107/72 (07-10-21 @ 10:43) (107/72 - 107/72)  RR: 16 (07-10-21 @ 10:43) (16 - 16)  SpO2: 99% (07-10-21 @ 10:43) (99% - 99%)   (10 Jul 2021 18:38)      PAST MEDICAL & SURGICAL HISTORY:  Mental retardation  profound    Seizure      FAMILY HISTORY:    Social:    Home Medications:  Ativan 1 mg oral tablet: 1 tab(s) orally 3 times a day (10 Jul 2021 19:09)  benztropine 0.5 mg oral tablet: 1 tab(s) orally 4 times a day (10 Jul 2021 19:08)  diphenhydrAMINE 50 mg oral tablet: 1 tab(s) orally 4 times a day (10 Jul 2021 19:09)  Keppra 500 mg oral tablet: 1 tab(s) orally 2 times a day (10 Jul 2021 19:08)  lamoTRIgine 25 mg oral tablet, disintegrating: 3 tab(s) orally 2 times a day (10 Jul 2021 19:08)      MEDICATIONS  (STANDING):  azithromycin  IVPB      benztropine 0.5 milliGRAM(s) Oral every 6 hours  cefTRIAXone   IVPB      enoxaparin Injectable 40 milliGRAM(s) SubCutaneous daily  lactated ringers. 1000 milliLiter(s) (100 mL/Hr) IV Continuous <Continuous>  lamoTRIgine 75 milliGRAM(s) Oral two times a day  levETIRAcetam 500 milliGRAM(s) Oral two times a day  polyethylene glycol 3350 17 Gram(s) Oral two times a day  senna 2 Tablet(s) Oral at bedtime    MEDICATIONS  (PRN):  acetaminophen   Tablet .. 650 milliGRAM(s) Oral every 6 hours PRN Temp greater or equal to 38C (100.4F), Mild Pain (1 - 3)  artificial tears (preservative free) Ophthalmic Solution 1 Drop(s) Both EYES three times a day PRN Dry Eyes  diphenhydrAMINE 50 milliGRAM(s) Oral every 6 hours PRN Rash and/or Itching  melatonin 5 milliGRAM(s) Oral at bedtime PRN Insomnia  ondansetron Injectable 4 milliGRAM(s) IV Push every 4 hours PRN Nausea and/or Vomiting      No Known Allergies      INTERVAL HPI/OVERNIGHT EVENTS:        REVIEW OF SYSTEMS:  CONSTITUTIONAL: No fever, weight loss, or fatigue  EYES: No eye pain, visual disturbances, or discharge  ENMT:  No difficulty hearing, tinnitus, vertigo; No sinus or throat pain  NECK: No pain or stiffness  BREASTS: No pain, masses, or nipple discharge  RESPIRATORY: No cough, wheezing, chills or hemoptysis; No shortness of breath  CARDIOVASCULAR: No chest pain, palpitations, dizziness, or leg swelling  GASTROINTESTINAL: No abdominal or epigastric pain. No nausea, vomiting, or hematemesis; No diarrhea or constipation. No melena or hematochezia.  GENITOURINARY: No dysuria, frequency, hematuria, or incontinence  NEUROLOGICAL: No headaches, memory loss, loss of strength, numbness, or tremors  SKIN: No itching, burning, rashes, or lesions   LYMPH NODES: No enlarged glands  ENDOCRINE: No heat or cold intolerance; No hair loss  MUSCULOSKELETAL: No joint pain or swelling; No muscle, back, or extremity pain  PSYCHIATRIC: mrdd, nonverbal  HEME/LYMPH: No easy bruising, or bleeding gums  ALLERY AND IMMUNOLOGIC: No hives or eczema    T(C): 36.7 (07-10-21 @ 20:23), Max: 37 (07-10-21 @ 11:37)  HR: 80 (07-10-21 @ 20:23) (80 - 82)  BP: 88/54 (07-10-21 @ 20:23) (88/54 - 107/72)  RR: 16 (07-10-21 @ 10:43) (16 - 16)  SpO2: 99% (07-10-21 @ 10:43) (99% - 99%)  Wt(kg): --Vital Signs Last 24 Hrs  T(C): 36.7 (10 Jul 2021 20:23), Max: 37 (10 Jul 2021 11:37)  T(F): 98.1 (10 Jul 2021 20:23), Max: 98.6 (10 Jul 2021 11:37)  HR: 80 (10 Jul 2021 20:23) (80 - 82)  BP: 88/54 (10 Jul 2021 20:23) (88/54 - 107/72)  BP(mean): --  RR: 16 (10 Jul 2021 10:43) (16 - 16)  SpO2: 99% (10 Jul 2021 10:43) (99% - 99%)    PHYSICAL EXAM:  GENERAL: contracted, nonverbal  HEAD:  Atraumatic  EYES: EOMI, PERRLA, conjunctiva and sclera clear  ENMT: No tonsillar erythema, exudates, or enlargement; Moist mucous membranes, Good dentition, No lesions  NECK: Supple, No JVD, Normal thyroid  NERVOUS SYSTEM:  lethargic, nonverbal  CHEST/LUNG: Clear to percussion bilaterally; No rales, rhonchi, wheezing, or rubs  HEART: Regular rate and rhythm; No murmurs, rubs, or gallops  ABDOMEN: Soft, Nontender, Nondistended; Bowel sounds present  EXTREMITIES:  2+ Peripheral Pulses, No clubbing, cyanosis, or edema, contracted  LYMPH: No lymphadenopathy noted  SKIN: No rashes or lesions    Consultant(s) Notes Reviewed:  [x ] YES  [ ] NO  Care Discussed with Consultants/Other Providers [ x] YES  [ ] NO    LABS:                        12.3   12.15 )-----------( 295      ( 10 Jul 2021 11:30 )             38.5     07-10    134<L>  |  104  |  6<L>  ----------------------------<  109<H>  4.2   |  22  |  0.7    Ca    9.5      10 Jul 2021 11:30    TPro  7.2  /  Alb  4.2  /  TBili  0.3  /  DBili  <0.2  /  AST  30  /  ALT  36  /  AlkPhos  60  07-10        RADIOLOGY & ADDITIONAL TESTS:    Imaging Personally Reviewed:  [ ] YES  [ ] NO    HEALTH ISSUES - PROBLEM Dx:    ?rll pna  rt adnexal mass- check ca 125, f/u us  hyponatremia- monitor  f/u labs  start bland diet and advance as tolerated

## 2021-07-11 LAB
ALBUMIN SERPL ELPH-MCNC: 3.7 G/DL — SIGNIFICANT CHANGE UP (ref 3.5–5.2)
ALP SERPL-CCNC: 66 U/L — SIGNIFICANT CHANGE UP (ref 30–115)
ALT FLD-CCNC: 27 U/L — SIGNIFICANT CHANGE UP (ref 0–41)
ANION GAP SERPL CALC-SCNC: 11 MMOL/L — SIGNIFICANT CHANGE UP (ref 7–14)
AST SERPL-CCNC: 21 U/L — SIGNIFICANT CHANGE UP (ref 0–41)
BASOPHILS # BLD AUTO: 0.12 K/UL — SIGNIFICANT CHANGE UP (ref 0–0.2)
BASOPHILS NFR BLD AUTO: 0.6 % — SIGNIFICANT CHANGE UP (ref 0–1)
BILIRUB SERPL-MCNC: 0.5 MG/DL — SIGNIFICANT CHANGE UP (ref 0.2–1.2)
BUN SERPL-MCNC: 5 MG/DL — LOW (ref 10–20)
CALCIUM SERPL-MCNC: 9.2 MG/DL — SIGNIFICANT CHANGE UP (ref 8.5–10.1)
CHLORIDE SERPL-SCNC: 103 MMOL/L — SIGNIFICANT CHANGE UP (ref 98–110)
CO2 SERPL-SCNC: 24 MMOL/L — SIGNIFICANT CHANGE UP (ref 17–32)
CREAT SERPL-MCNC: 0.5 MG/DL — LOW (ref 0.7–1.5)
EOSINOPHIL # BLD AUTO: 0.37 K/UL — SIGNIFICANT CHANGE UP (ref 0–0.7)
EOSINOPHIL NFR BLD AUTO: 1.9 % — SIGNIFICANT CHANGE UP (ref 0–8)
GLUCOSE SERPL-MCNC: 67 MG/DL — LOW (ref 70–99)
HCT VFR BLD CALC: 33.2 % — LOW (ref 37–47)
HGB BLD-MCNC: 10.5 G/DL — LOW (ref 12–16)
IMM GRANULOCYTES NFR BLD AUTO: 0.6 % — HIGH (ref 0.1–0.3)
LYMPHOCYTES # BLD AUTO: 16.2 % — LOW (ref 20.5–51.1)
LYMPHOCYTES # BLD AUTO: 3.09 K/UL — SIGNIFICANT CHANGE UP (ref 1.2–3.4)
MAGNESIUM SERPL-MCNC: 1.8 MG/DL — SIGNIFICANT CHANGE UP (ref 1.8–2.4)
MCHC RBC-ENTMCNC: 27.6 PG — SIGNIFICANT CHANGE UP (ref 27–31)
MCHC RBC-ENTMCNC: 31.6 G/DL — LOW (ref 32–37)
MCV RBC AUTO: 87.4 FL — SIGNIFICANT CHANGE UP (ref 81–99)
MONOCYTES # BLD AUTO: 1.52 K/UL — HIGH (ref 0.1–0.6)
MONOCYTES NFR BLD AUTO: 8 % — SIGNIFICANT CHANGE UP (ref 1.7–9.3)
NEUTROPHILS # BLD AUTO: 13.88 K/UL — HIGH (ref 1.4–6.5)
NEUTROPHILS NFR BLD AUTO: 72.7 % — SIGNIFICANT CHANGE UP (ref 42.2–75.2)
NRBC # BLD: 0 /100 WBCS — SIGNIFICANT CHANGE UP (ref 0–0)
PLATELET # BLD AUTO: 264 K/UL — SIGNIFICANT CHANGE UP (ref 130–400)
POTASSIUM SERPL-MCNC: 3.8 MMOL/L — SIGNIFICANT CHANGE UP (ref 3.5–5)
POTASSIUM SERPL-SCNC: 3.8 MMOL/L — SIGNIFICANT CHANGE UP (ref 3.5–5)
PROT SERPL-MCNC: 6.1 G/DL — SIGNIFICANT CHANGE UP (ref 6–8)
RBC # BLD: 3.8 M/UL — LOW (ref 4.2–5.4)
RBC # FLD: 13 % — SIGNIFICANT CHANGE UP (ref 11.5–14.5)
SODIUM SERPL-SCNC: 138 MMOL/L — SIGNIFICANT CHANGE UP (ref 135–146)
WBC # BLD: 19.09 K/UL — HIGH (ref 4.8–10.8)
WBC # FLD AUTO: 19.09 K/UL — HIGH (ref 4.8–10.8)

## 2021-07-11 RX ADMIN — AZITHROMYCIN 255 MILLIGRAM(S): 500 TABLET, FILM COATED ORAL at 17:32

## 2021-07-11 RX ADMIN — LAMOTRIGINE 75 MILLIGRAM(S): 25 TABLET, ORALLY DISINTEGRATING ORAL at 05:28

## 2021-07-11 RX ADMIN — LEVETIRACETAM 500 MILLIGRAM(S): 250 TABLET, FILM COATED ORAL at 05:28

## 2021-07-11 RX ADMIN — Medication 0.5 MILLIGRAM(S): at 05:28

## 2021-07-11 RX ADMIN — CEFTRIAXONE 100 MILLIGRAM(S): 500 INJECTION, POWDER, FOR SOLUTION INTRAMUSCULAR; INTRAVENOUS at 17:27

## 2021-07-11 RX ADMIN — POLYETHYLENE GLYCOL 3350 17 GRAM(S): 17 POWDER, FOR SOLUTION ORAL at 17:27

## 2021-07-11 RX ADMIN — Medication 1 MILLIGRAM(S): at 21:28

## 2021-07-11 RX ADMIN — Medication 0.5 MILLIGRAM(S): at 17:26

## 2021-07-11 RX ADMIN — Medication 1 MILLIGRAM(S): at 13:14

## 2021-07-11 RX ADMIN — ONDANSETRON 4 MILLIGRAM(S): 8 TABLET, FILM COATED ORAL at 16:58

## 2021-07-11 RX ADMIN — ENOXAPARIN SODIUM 40 MILLIGRAM(S): 100 INJECTION SUBCUTANEOUS at 11:40

## 2021-07-11 RX ADMIN — Medication 0.5 MILLIGRAM(S): at 11:40

## 2021-07-11 RX ADMIN — Medication 1 MILLIGRAM(S): at 05:28

## 2021-07-11 RX ADMIN — LEVETIRACETAM 500 MILLIGRAM(S): 250 TABLET, FILM COATED ORAL at 17:27

## 2021-07-11 RX ADMIN — SENNA PLUS 2 TABLET(S): 8.6 TABLET ORAL at 21:28

## 2021-07-11 RX ADMIN — LAMOTRIGINE 75 MILLIGRAM(S): 25 TABLET, ORALLY DISINTEGRATING ORAL at 17:27

## 2021-07-11 RX ADMIN — Medication 0.5 MILLIGRAM(S): at 23:26

## 2021-07-11 NOTE — PROGRESS NOTE ADULT - SUBJECTIVE AND OBJECTIVE BOX
JOSS HERNANDEZ 31y Female  MRN#: [id number]   CODE STATUS:________    Hospital Day: 1d    Pt is currently admitted with the primary diagnosis of aspiration pneumonia     SUBJECTIVE  JOSS HERNANDEZ  31y  Female  admitted w intractable vomiting  afebrile  ct findings noted  cxr-? rll PNA??    Patient is a 31y old  Female who presents with a chief complaint of aspiration pneumonia (10 Jul 2021 18:38)    HPI:  Patient is a 30 y/o female with Pmhx MR non verbal at baseline, seizure disorder presented to the hospital for non-bilious non-bloody vomiting x 1 day. Patient is non verbal at baseline, she is from [facility] provided by group home staff Albert Jim Simmons. Per group home staff patient was recently started on PO ativan for her seizure disorder. Yesterday after breakfast patient throw up x 2. After pt throw up pt started coughing so today she was brought to the hospital for further evaluation. No fever, chills, diarrhea, seizure like activity or weakness.    In ED:  T(C): 37 (07-10-21 @ 11:37), Max: 37 (07-10-21 @ 11:37)  HR: 82 (07-10-21 @ 10:43) (82 - 82)  BP: 107/72 (07-10-21 @ 10:43) (107/72 - 107/72)  RR: 16 (07-10-21 @ 10:43) (16 - 16)  SpO2: 99% (07-10-21 @ 10:43) (99% - 99%)   (10 Jul 2021 18:38)    no events overnight                                           ----------------------------------------------------------  OBJECTIVE  PAST MEDICAL & SURGICAL HISTORY  Mental retardation  profound    Seizure                                              -----------------------------------------------------------  ALLERGIES:  No Known Allergies                                            ------------------------------------------------------------    HOME MEDICATIONS  Home Medications:  Ativan 1 mg oral tablet: 1 tab(s) orally 3 times a day (10 Jul 2021 19:09)  benztropine 0.5 mg oral tablet: 1 tab(s) orally 4 times a day (10 Jul 2021 19:08)  diphenhydrAMINE 50 mg oral tablet: 1 tab(s) orally 4 times a day (10 Jul 2021 19:09)  Keppra 500 mg oral tablet: 1 tab(s) orally 2 times a day (10 Jul 2021 19:08)  lamoTRIgine 25 mg oral tablet, disintegrating: 3 tab(s) orally 2 times a day (10 Jul 2021 19:08)                           MEDICATIONS:  STANDING MEDICATIONS  azithromycin  IVPB      azithromycin  IVPB 500 milliGRAM(s) IV Intermittent every 24 hours  benztropine 0.5 milliGRAM(s) Oral every 6 hours  cefTRIAXone   IVPB 1000 milliGRAM(s) IV Intermittent every 24 hours  cefTRIAXone   IVPB      enoxaparin Injectable 40 milliGRAM(s) SubCutaneous daily  lactated ringers. 1000 milliLiter(s) IV Continuous <Continuous>  lamoTRIgine 75 milliGRAM(s) Oral two times a day  levETIRAcetam 500 milliGRAM(s) Oral two times a day  LORazepam     Tablet 1 milliGRAM(s) Oral three times a day  polyethylene glycol 3350 17 Gram(s) Oral two times a day  senna 2 Tablet(s) Oral at bedtime    PRN MEDICATIONS  acetaminophen   Tablet .. 650 milliGRAM(s) Oral every 6 hours PRN  artificial tears (preservative free) Ophthalmic Solution 1 Drop(s) Both EYES three times a day PRN  diphenhydrAMINE 50 milliGRAM(s) Oral every 6 hours PRN  melatonin 5 milliGRAM(s) Oral at bedtime PRN  ondansetron Injectable 4 milliGRAM(s) IV Push every 4 hours PRN                                            ------------------------------------------------------------  VITAL SIGNS: Last 24 Hours  T(C): 37 (11 Jul 2021 05:36), Max: 37 (10 Jul 2021 11:37)  T(F): 98.6 (11 Jul 2021 05:36), Max: 98.6 (10 Jul 2021 11:37)  HR: 71 (11 Jul 2021 05:36) (71 - 82)  BP: 106/56 (11 Jul 2021 05:36) (88/54 - 107/72)  BP(mean): --  RR: 16 (11 Jul 2021 05:36) (16 - 16)  SpO2: 99% (10 Jul 2021 10:43) (99% - 99%)                                             --------------------------------------------------------------  LABS:                        10.5   19.09 )-----------( 264      ( 11 Jul 2021 06:56 )             33.2     07-10    134<L>  |  104  |  6<L>  ----------------------------<  109<H>  4.2   |  22  |  0.7    Ca    9.5      10 Jul 2021 11:30    TPro  7.2  /  Alb  4.2  /  TBili  0.3  /  DBili  <0.2  /  AST  30  /  ALT  36  /  AlkPhos  60  07-10          Lactate, Blood: 1.4 mmol/L (07-10-21 @ 11:30)                                                      -------------------------------------------------------------  RADIOLOGY:  IMPRESSION:    No bowel obstruction or ascites.    Approximate 4.8 cm x 4.1 x 6.7 cm solid complex right adnexal mass. (Series 6/37). Correlation with pelvic sonogram recommended.    3.4 cm left adnexa. (5/348).    Right middle lobe and scattered bilateral lower lobe opacities.    --- End of Report ---    SADIQ WALTERS MD; Attending Radiologist  This document has been electronically signed. Jul 10 2021  2:52PM    EXAM:  XR CHEST PORTABLE IMMED 1V            PROCEDURE DATE:  07/10/2021            INTERPRETATION:  Clinical History/Reason for Exam:  cough    Comparison: None.      Findings:    Technique/Positioning:  Frontal radiograph of the chest.    Supportdevices:  none    Cardiac/mediastinum/hilum: Unremarkable    Lung parenchyma/ Pleura: Right basilar peribronchial opacity. No pleural effusions or pneumothorax.      Skeleton/soft tissues: No focal skeletal lesions are identified.      Impression:    Right basilar peribronchial opacity. No pleural effusions or pneumothorax.          --- End of Report ---                                                --------------------------------------------------------------    PHYSICAL EXAM:  General:   HEENT: NCAT  LUNGS: CTAB, Good air entry bilat  HEART: RRR, +S1,S2, RRR  ABDOMEN: SNTTP, ND x 4 q's  EXT: Warm, well perfused x 4  NEURO: AxOx3, No FND's noted  SKIN: No new breakdown or rashes noted                                           --------------------------------------------------------------

## 2021-07-11 NOTE — PROGRESS NOTE ADULT - SUBJECTIVE AND OBJECTIVE BOX
DAVID JOSS  31y  Female  admitted w vomiting  rll pna  clinically stable  tolerated po meds  afebrile  agitated- her usual self-injurious behavior  bp low- getting fluids    INTERVAL EVENTS:    T(C): 37 (07-11-21 @ 05:36), Max: 37 (07-10-21 @ 11:37)  HR: 71 (07-11-21 @ 05:36) (71 - 82)  BP: 106/56 (07-11-21 @ 05:36) (88/54 - 107/72)  RR: 16 (07-11-21 @ 05:36) (16 - 16)  SpO2: 99% (07-10-21 @ 10:43) (99% - 99%)  Wt(kg): --Vital Signs Last 24 Hrs  T(C): 37 (11 Jul 2021 05:36), Max: 37 (10 Jul 2021 11:37)  T(F): 98.6 (11 Jul 2021 05:36), Max: 98.6 (10 Jul 2021 11:37)  HR: 71 (11 Jul 2021 05:36) (71 - 82)  BP: 106/56 (11 Jul 2021 05:36) (88/54 - 107/72)  BP(mean): --  RR: 16 (11 Jul 2021 05:36) (16 - 16)  SpO2: 99% (10 Jul 2021 10:43) (99% - 99%)    PHYSICAL EXAM:  GENERAL: restless in bed  NECK: Supple, No JVD, Normal thyroid  CHEST/LUNG: Clear; No crackles or wheezing  HEART: S1, S2, Regular rate and rhythm;   ABDOMEN: Soft, Nontender, Nondistended; Bowel sounds present  EXTREMITIES: No clubbing, cyanosis, or edema  SKIN: No rashes or lesions    LABS:                          12.3   12.15 )-----------( 295      ( 10 Jul 2021 11:30 )             38.5     07-10    134<L>  |  104  |  6<L>  ----------------------------<  109<H>  4.2   |  22  |  0.7    Ca    9.5      10 Jul 2021 11:30    TPro  7.2  /  Alb  4.2  /  TBili  0.3  /  DBili  <0.2  /  AST  30  /  ALT  36  /  AlkPhos  60  07-10              acetaminophen   Tablet .. 650 milliGRAM(s) Oral every 6 hours PRN  artificial tears (preservative free) Ophthalmic Solution 1 Drop(s) Both EYES three times a day PRN  azithromycin  IVPB      azithromycin  IVPB 500 milliGRAM(s) IV Intermittent every 24 hours  benztropine 0.5 milliGRAM(s) Oral every 6 hours  cefTRIAXone   IVPB 1000 milliGRAM(s) IV Intermittent every 24 hours  cefTRIAXone   IVPB      diphenhydrAMINE 50 milliGRAM(s) Oral every 6 hours PRN  enoxaparin Injectable 40 milliGRAM(s) SubCutaneous daily  lactated ringers. 1000 milliLiter(s) IV Continuous <Continuous>  lamoTRIgine 75 milliGRAM(s) Oral two times a day  levETIRAcetam 500 milliGRAM(s) Oral two times a day  LORazepam     Tablet 1 milliGRAM(s) Oral three times a day  melatonin 5 milliGRAM(s) Oral at bedtime PRN  ondansetron Injectable 4 milliGRAM(s) IV Push every 4 hours PRN  polyethylene glycol 3350 17 Gram(s) Oral two times a day  senna 2 Tablet(s) Oral at bedtime      RADIOLOGY & ADDITIONAL TESTS:    case discussed with the resident  Available consult notes reviewed    Assessment and plan:       us done for rt adnexal mass  get ca125  cont antibiotics  swallow eval- then start diet as tolerated

## 2021-07-11 NOTE — PROGRESS NOTE ADULT - ASSESSMENT
Assessment     Patient is a 30 y/o female with Pmhx MR non verbal at baseline, seizure disorder presented to the hospital for non-bilious non-bloody vomiting x 1 day. Patient is non verbal at baseline, she is from [facility] provided by group home staff Mr. Jim Simmons. Per group home staff patient was recently started on PO ativan for her seizure disorder. Yesterday after breakfast patient throw up x 2. After pt throw up pt started coughing so today she was brought to the hospital for further evaluation. No fever, chills, diarrhea, seizure like activity or weakness. however per group home aid patient is sleepier than usual    head CT negative. Patient was found have  aspiration pneumonia and CXR and CT. she also has a incidental finding of adnexal mass on CT scan.    #aspiration pneumonia  -found to have aspiration pneumonia on CXR and CT on admission  -NBNB vomiting x 2  -SIRS negative on admission. afebrile. WBC 12  -f/u blood culture, sputum culture  -speech and swallow eval  -zofran PRN for nausea  -s/p azithromycin and ceftriaxone in the ED  -start ceftriaxone 1g IV q24hr & azithromycin 200mg q24hrs    #r/o seizure disorder  -patient has a pmhx of seizure disorder, medication regime recently changed, started on ativan  -resume home meds, medication was confirmed by documents from group home  -EEG this admission to r/o seizure  -neuro eval if eeg positive    #Left adnexal mass  -incidental finding on ct  -f/u pelvic ultrasound  -can be Follow up as an out pt, ob gyn consult prn    activity: ambulate as tolerated  diet: NPO until S&S eval  dvt ppx: lvx  gi ppx: non indicated  dispo: acute     Assessment     Patient is a 32 y/o female with Pmhx MR non verbal at baseline, seizure disorder presented to the hospital for non-bilious non-bloody vomiting x 1 day. Patient is non verbal at baseline, she is from [facility] provided by group home staff Mr. Jim Simmons. Per group home staff patient was recently started on PO ativan for her seizure disorder. Yesterday after breakfast patient throw up x 2. After pt throw up pt started coughing so today she was brought to the hospital for further evaluation. No fever, chills, diarrhea, seizure like activity or weakness. however per group home aid patient is sleepier than usual    head CT negative. Patient was found have  aspiration pneumonia and CXR and CT. she also has a incidental finding of adnexal mass on CT scan.    #aspiration pneumonia  -found to have aspiration pneumonia on CXR and CT on admission  -NBNB vomiting x 2  -SIRS negative on admission. afebrile. WBC 12  -f/u blood culture, sputum culture  -speech and swallow eval- dysphagia 1 started   -zofran PRN for nausea  -s/p azithromycin and ceftriaxone in the ED  -start ceftriaxone 1g IV q24hr & azithromycin 200mg q24hrs    #r/o seizure disorder  -patient has a pmhx of seizure disorder, medication regime recently changed, started on ativan  -resume home meds, medication was confirmed by documents from group home  -EEG this admission to r/o seizure  -neuro eval if eeg positive    #Left adnexal mass  -incidental finding on ct  -f/u pelvic ultrasound  -can be Follow up as an out pt, ob gyn consult prn    activity: ambulate as tolerated  diet: dysphagia 1   dvt ppx: lvx  gi ppx: non indicated  dispo: acute

## 2021-07-12 LAB
COVID-19 SPIKE DOMAIN AB INTERP: POSITIVE
COVID-19 SPIKE DOMAIN ANTIBODY RESULT: >250 U/ML — HIGH
SARS-COV-2 IGG+IGM SERPL QL IA: >250 U/ML — HIGH
SARS-COV-2 IGG+IGM SERPL QL IA: POSITIVE

## 2021-07-12 RX ORDER — SODIUM CHLORIDE 9 MG/ML
500 INJECTION, SOLUTION INTRAVENOUS
Refills: 0 | Status: DISCONTINUED | OUTPATIENT
Start: 2021-07-12 | End: 2021-07-13

## 2021-07-12 RX ORDER — PIPERACILLIN AND TAZOBACTAM 4; .5 G/20ML; G/20ML
3.38 INJECTION, POWDER, LYOPHILIZED, FOR SOLUTION INTRAVENOUS EVERY 6 HOURS
Refills: 0 | Status: DISCONTINUED | OUTPATIENT
Start: 2021-07-12 | End: 2021-07-19

## 2021-07-12 RX ORDER — PIPERACILLIN AND TAZOBACTAM 4; .5 G/20ML; G/20ML
3.38 INJECTION, POWDER, LYOPHILIZED, FOR SOLUTION INTRAVENOUS ONCE
Refills: 0 | Status: COMPLETED | OUTPATIENT
Start: 2021-07-12 | End: 2021-07-12

## 2021-07-12 RX ORDER — SODIUM CHLORIDE 9 MG/ML
500 INJECTION, SOLUTION INTRAVENOUS
Refills: 0 | Status: DISCONTINUED | OUTPATIENT
Start: 2021-07-12 | End: 2021-07-12

## 2021-07-12 RX ADMIN — Medication 0.5 MILLIGRAM(S): at 11:48

## 2021-07-12 RX ADMIN — SENNA PLUS 2 TABLET(S): 8.6 TABLET ORAL at 21:40

## 2021-07-12 RX ADMIN — LAMOTRIGINE 75 MILLIGRAM(S): 25 TABLET, ORALLY DISINTEGRATING ORAL at 05:05

## 2021-07-12 RX ADMIN — LAMOTRIGINE 75 MILLIGRAM(S): 25 TABLET, ORALLY DISINTEGRATING ORAL at 18:18

## 2021-07-12 RX ADMIN — POLYETHYLENE GLYCOL 3350 17 GRAM(S): 17 POWDER, FOR SOLUTION ORAL at 18:19

## 2021-07-12 RX ADMIN — SODIUM CHLORIDE 100 MILLILITER(S): 9 INJECTION, SOLUTION INTRAVENOUS at 08:23

## 2021-07-12 RX ADMIN — Medication 1 MILLIGRAM(S): at 05:05

## 2021-07-12 RX ADMIN — SODIUM CHLORIDE 100 MILLILITER(S): 9 INJECTION, SOLUTION INTRAVENOUS at 20:40

## 2021-07-12 RX ADMIN — LEVETIRACETAM 500 MILLIGRAM(S): 250 TABLET, FILM COATED ORAL at 18:19

## 2021-07-12 RX ADMIN — Medication 0.5 MILLIGRAM(S): at 18:18

## 2021-07-12 RX ADMIN — Medication 0.5 MILLIGRAM(S): at 05:05

## 2021-07-12 RX ADMIN — Medication 1 MILLIGRAM(S): at 22:15

## 2021-07-12 RX ADMIN — LEVETIRACETAM 500 MILLIGRAM(S): 250 TABLET, FILM COATED ORAL at 05:05

## 2021-07-12 RX ADMIN — ENOXAPARIN SODIUM 40 MILLIGRAM(S): 100 INJECTION SUBCUTANEOUS at 11:49

## 2021-07-12 RX ADMIN — PIPERACILLIN AND TAZOBACTAM 200 GRAM(S): 4; .5 INJECTION, POWDER, LYOPHILIZED, FOR SOLUTION INTRAVENOUS at 23:31

## 2021-07-12 RX ADMIN — POLYETHYLENE GLYCOL 3350 17 GRAM(S): 17 POWDER, FOR SOLUTION ORAL at 05:05

## 2021-07-12 RX ADMIN — PIPERACILLIN AND TAZOBACTAM 200 GRAM(S): 4; .5 INJECTION, POWDER, LYOPHILIZED, FOR SOLUTION INTRAVENOUS at 18:18

## 2021-07-12 RX ADMIN — Medication 0.5 MILLIGRAM(S): at 23:31

## 2021-07-12 NOTE — CONSULT NOTE ADULT - COMMENTS
unable to obtain history secondary to patient's mental status and/or sedation  as er aide has a cough  on RA

## 2021-07-12 NOTE — PROGRESS NOTE ADULT - SUBJECTIVE AND OBJECTIVE BOX
SUBJECTIVE:    Patient is a 31y old Female who presents with a chief complaint of aspiration pneumonia (12 Jul 2021 15:37)    Currently admitted to medicine with the primary diagnosis of Pneumonia       Today is hospital day 2d. This morning she is resting comfortably in bed and reports no new issues or overnight events.     PAST MEDICAL & SURGICAL HISTORY  Mental retardation  profound    Seizure      SOCIAL HISTORY:  Negative for smoking/alcohol/drug use.     ALLERGIES:  No Known Allergies    MEDICATIONS:  STANDING MEDICATIONS  benztropine 0.5 milliGRAM(s) Oral every 6 hours  enoxaparin Injectable 40 milliGRAM(s) SubCutaneous daily  lamoTRIgine 75 milliGRAM(s) Oral two times a day  levETIRAcetam 500 milliGRAM(s) Oral two times a day  LORazepam     Tablet 1 milliGRAM(s) Oral three times a day  piperacillin/tazobactam IVPB. 3.375 Gram(s) IV Intermittent once  piperacillin/tazobactam IVPB.. 3.375 Gram(s) IV Intermittent every 6 hours  polyethylene glycol 3350 17 Gram(s) Oral two times a day  senna 2 Tablet(s) Oral at bedtime    PRN MEDICATIONS  acetaminophen   Tablet .. 650 milliGRAM(s) Oral every 6 hours PRN  artificial tears (preservative free) Ophthalmic Solution 1 Drop(s) Both EYES three times a day PRN  diphenhydrAMINE 50 milliGRAM(s) Oral every 6 hours PRN  melatonin 5 milliGRAM(s) Oral at bedtime PRN  ondansetron Injectable 4 milliGRAM(s) IV Push every 4 hours PRN    VITALS:   T(F): 96.4  HR: 68  BP: 122/56  RR: 15  SpO2: 99%    LABS:                        10.5   19.09 )-----------( 264      ( 11 Jul 2021 06:56 )             33.2     07-11    138  |  103  |  5<L>  ----------------------------<  67<L>  3.8   |  24  |  0.5<L>    Ca    9.2      11 Jul 2021 06:56  Mg     1.8     07-11    TPro  6.1  /  Alb  3.7  /  TBili  0.5  /  DBili  x   /  AST  21  /  ALT  27  /  AlkPhos  66  07-11              Culture - Blood (collected 10 Jul 2021 14:10)  Source: .Blood Blood-Peripheral  Preliminary Report (11 Jul 2021 19:01):    No growth to date.    Culture - Blood (collected 10 Jul 2021 14:10)  Source: .Blood Blood-Peripheral  Preliminary Report (11 Jul 2021 19:01):    No growth to date.  RADIOLOGY:    PHYSICAL EXAM:  GEN: No acute distress  LUNGS: Clear to auscultation bilaterally, no wheezes, rales, rhonchi  HEART: Regular rate and rhythm +s1 s2  ABD: Soft, non-tender, non-distended.   EXT: no LE edema/2+PP  NEURO: unable to assess     SUBJECTIVE:    Patient is a 31y old Female who presents with a chief complaint of aspiration pneumonia (12 Jul 2021 15:37)    Currently admitted to medicine with the primary diagnosis of Pneumonia       Today is hospital day 2d. This morning she is resting comfortably in bed and reports no new issues or overnight events.     PAST MEDICAL & SURGICAL HISTORY  Mental retardation  profound    Seizure      ALLERGIES:  No Known Allergies    MEDICATIONS:  STANDING MEDICATIONS  benztropine 0.5 milliGRAM(s) Oral every 6 hours  enoxaparin Injectable 40 milliGRAM(s) SubCutaneous daily  lamoTRIgine 75 milliGRAM(s) Oral two times a day  levETIRAcetam 500 milliGRAM(s) Oral two times a day  LORazepam     Tablet 1 milliGRAM(s) Oral three times a day  piperacillin/tazobactam IVPB. 3.375 Gram(s) IV Intermittent once  piperacillin/tazobactam IVPB.. 3.375 Gram(s) IV Intermittent every 6 hours  polyethylene glycol 3350 17 Gram(s) Oral two times a day  senna 2 Tablet(s) Oral at bedtime    PRN MEDICATIONS  acetaminophen   Tablet .. 650 milliGRAM(s) Oral every 6 hours PRN  artificial tears (preservative free) Ophthalmic Solution 1 Drop(s) Both EYES three times a day PRN  diphenhydrAMINE 50 milliGRAM(s) Oral every 6 hours PRN  melatonin 5 milliGRAM(s) Oral at bedtime PRN  ondansetron Injectable 4 milliGRAM(s) IV Push every 4 hours PRN    VITALS:   T(F): 96.4  HR: 68  BP: 122/56  RR: 15  SpO2: 99%    LABS:                        10.5   19.09 )-----------( 264      ( 11 Jul 2021 06:56 )             33.2     07-11    138  |  103  |  5<L>  ----------------------------<  67<L>  3.8   |  24  |  0.5<L>    Ca    9.2      11 Jul 2021 06:56  Mg     1.8     07-11    TPro  6.1  /  Alb  3.7  /  TBili  0.5  /  DBili  x   /  AST  21  /  ALT  27  /  AlkPhos  66  07-11              Culture - Blood (collected 10 Jul 2021 14:10)  Source: .Blood Blood-Peripheral  Preliminary Report (11 Jul 2021 19:01):    No growth to date.    Culture - Blood (collected 10 Jul 2021 14:10)  Source: .Blood Blood-Peripheral  Preliminary Report (11 Jul 2021 19:01):    No growth to date.  RADIOLOGY:    PHYSICAL EXAM:  GEN: No acute distress  LUNGS: Clear to auscultation bilaterally, no wheezes, rales, rhonchi  HEART: Regular rate and rhythm +s1 s2  ABD: Soft, non-tender, non-distended.   EXT: no LE edema/2+PP  NEURO: unable to assess

## 2021-07-12 NOTE — CONSULT NOTE ADULT - ASSESSMENT
32 y/o female with Pmhx MR non verbal at baseline, seizure disorder presented to the hospital for non-bilious non-bloody vomiting x 1 day. Patient is non verbal at baseline, she is from [facility] provided by group home staff Mr. Jim Simmons. Per group home staff patient was recently started on PO ativan for her seizure disorder. Yesterday after breakfast patient throw up x 2. After pt throw up pt started coughing so today she was brought to the hospital for further evaluation. No fever, chills, diarrhea, seizure like activity or weakness.    IMPRESSION;  #Aspiration with probable bacterial PNA RML with leucocytosis  7/10 CT A/P: Right middle lobe and scattered bilateral lower lobe opacities.  7/10 BCx NGTD  # CT : Approximate 4.8 cm x 4.1 x 6.7 cm solid complex right adnexal mass.   Pelvic sonogram could not be performed    RECOMMENDATIONS;  Zosyn 3.375 gm iv q6h

## 2021-07-12 NOTE — CONSULT NOTE ADULT - SUBJECTIVE AND OBJECTIVE BOX
JOSS HERNANDEZ  31y, Female  Allergy: No Known Allergies      All historical available data reviewed.    HPI:  Patient is a 30 y/o female with Pmhx MR non verbal at baseline, seizure disorder presented to the hospital for non-bilious non-bloody vomiting x 1 day. Patient is non verbal at baseline, she is from [facility] provided by group home staff Mr. Jim Simmons. Per group home staff patient was recently started on PO ativan for her seizure disorder. Yesterday after breakfast patient throw up x 2. After pt throw up pt started coughing so today she was brought to the hospital for further evaluation. No fever, chills, diarrhea, seizure like activity or weakness.    In ED:  T(C): 37 (07-10-21 @ 11:37), Max: 37 (07-10-21 @ 11:37)  HR: 82 (07-10-21 @ 10:43) (82 - 82)  BP: 107/72 (07-10-21 @ 10:43) (107/72 - 107/72)  RR: 16 (07-10-21 @ 10:43) (16 - 16)  SpO2: 99% (07-10-21 @ 10:43) (99% - 99%)   (10 Jul 2021 18:38)    FAMILY HISTORY:    PAST MEDICAL & SURGICAL HISTORY:  Mental retardation  profound    Seizure          VITALS:  T(F): 96.4, Max: 97.1 (07-12-21 @ 05:00)  HR: 68  BP: 122/56  RR: 15Vital Signs Last 24 Hrs  T(C): 35.8 (12 Jul 2021 13:20), Max: 36.2 (12 Jul 2021 05:00)  T(F): 96.4 (12 Jul 2021 13:20), Max: 97.1 (12 Jul 2021 05:00)  HR: 68 (12 Jul 2021 13:20) (68 - 81)  BP: 122/56 (12 Jul 2021 13:20) (98/62 - 127/68)  BP(mean): --  RR: 15 (12 Jul 2021 13:20) (15 - 18)  SpO2: 99% (11 Jul 2021 20:30) (99% - 99%)    TESTS & MEASUREMENTS:                        10.5   19.09 )-----------( 264      ( 11 Jul 2021 06:56 )             33.2     07-11    138  |  103  |  5<L>  ----------------------------<  67<L>  3.8   |  24  |  0.5<L>    Ca    9.2      11 Jul 2021 06:56  Mg     1.8     07-11    TPro  6.1  /  Alb  3.7  /  TBili  0.5  /  DBili  x   /  AST  21  /  ALT  27  /  AlkPhos  66  07-11    LIVER FUNCTIONS - ( 11 Jul 2021 06:56 )  Alb: 3.7 g/dL / Pro: 6.1 g/dL / ALK PHOS: 66 U/L / ALT: 27 U/L / AST: 21 U/L / GGT: x             Culture - Blood (collected 07-10-21 @ 14:10)  Source: .Blood Blood-Peripheral  Preliminary Report (07-11-21 @ 19:01):    No growth to date.    Culture - Blood (collected 07-10-21 @ 14:10)  Source: .Blood Blood-Peripheral  Preliminary Report (07-11-21 @ 19:01):    No growth to date.            RADIOLOGY & ADDITIONAL TESTS:  Personal review of radiological diagnostics performed  Echo and EKG results noted when applicable.     MEDICATIONS:  acetaminophen   Tablet .. 650 milliGRAM(s) Oral every 6 hours PRN  artificial tears (preservative free) Ophthalmic Solution 1 Drop(s) Both EYES three times a day PRN  azithromycin  IVPB      azithromycin  IVPB 500 milliGRAM(s) IV Intermittent every 24 hours  benztropine 0.5 milliGRAM(s) Oral every 6 hours  cefTRIAXone   IVPB 1000 milliGRAM(s) IV Intermittent every 24 hours  cefTRIAXone   IVPB      diphenhydrAMINE 50 milliGRAM(s) Oral every 6 hours PRN  enoxaparin Injectable 40 milliGRAM(s) SubCutaneous daily  lamoTRIgine 75 milliGRAM(s) Oral two times a day  levETIRAcetam 500 milliGRAM(s) Oral two times a day  LORazepam     Tablet 1 milliGRAM(s) Oral three times a day  melatonin 5 milliGRAM(s) Oral at bedtime PRN  ondansetron Injectable 4 milliGRAM(s) IV Push every 4 hours PRN  polyethylene glycol 3350 17 Gram(s) Oral two times a day  senna 2 Tablet(s) Oral at bedtime      ANTIBIOTICS:  azithromycin  IVPB      azithromycin  IVPB 500 milliGRAM(s) IV Intermittent every 24 hours  cefTRIAXone   IVPB 1000 milliGRAM(s) IV Intermittent every 24 hours  cefTRIAXone   IVPB

## 2021-07-12 NOTE — PROGRESS NOTE ADULT - ASSESSMENT
Patient is a 30 y/o female with Pmhx MR non verbal at baseline, seizure disorder presented to the hospital for non-bilious non-bloody vomiting x 1 day. Patient is non verbal at baseline, she is from [facility] provided by group home staff Mr. Jim Simmons. Per group home staff patient was recently started on PO ativan for her seizure disorder. Yesterday after breakfast patient throw up x 2. After pt throw up pt started coughing so today she was brought to the hospital for further evaluation. No fever, chills, diarrhea, seizure like activity or weakness. however per group home aid patient is sleepier than usual    head CT negative. Patient was found have  aspiration pneumonia and CXR and CT. she also has a incidental finding of adnexal mass on CT scan.    #aspiration pneumonia  -found to have aspiration pneumonia on CXR and CT on admission  -NBNB vomiting x 2  -SIRS negative on admission. afebrile. WBC 12  -f/u blood culture, sputum culture  -speech and swallow eval- dysphagia 1 started   -zofran PRN for nausea  -s/p azithromycin and ceftriaxone in the ED  -start ceftriaxone 1g IV q24hr & azithromycin 200mg q24hrs    #r/o seizure disorder  -patient has a pmhx of seizure disorder, medication regime recently changed, started on ativan  -resume home meds, medication was confirmed by documents from group home  -EEG this admission to r/o seizure  -neuro eval if eeg positive    #Left adnexal mass  -incidental finding on ct  -f/u pelvic ultrasound  -can be Follow up as an out pt, ob gyn consult prn    activity: ambulate as tolerated  diet: dysphagia 1   dvt ppx: lvx  gi ppx: non indicated  dispo: acute     Patient is a 32 y/o female with Pmhx MR non verbal at baseline, seizure disorder presented to the hospital for non-bilious non-bloody vomiting x 1 day. Patient is non verbal at baseline, she is from [facility] provided by group home staff Mr. Jim Simmons. Per group home staff patient was recently started on PO ativan for her seizure disorder. Yesterday after breakfast patient throw up x 2. After pt throw up pt started coughing so today she was brought to the hospital for further evaluation. No fever, chills, diarrhea, seizure like activity or weakness. however per group home aid patient is sleepier than usual    head CT negative. Patient was found have  aspiration pneumonia and CXR and CT. she also has a incidental finding of adnexal mass on CT scan.    #aspiration pneumonia  -found to have aspiration pneumonia on CXR and CT on admission  -NBNB vomiting x 2  -SIRS negative on admission. afebrile. WBC 12  - blood culture NGTD  -speech and swallow eval- dysphagia 1 started   -zofran PRN for nausea  -s/p azithromycin and ceftriaxone in the ED  -d/c ceftriaxone 1g IV q24hr & azithromycin 200mg q24hrs, start Zosyn 3.375 gm iv q6h as per ID    #r/o seizure disorder  -patient has a pmhx of seizure disorder, medication regime recently changed, started on ativan  -c/w home meds  -EEG this admission to r/o seizure  -neuro eval if eeg positive    # right adnexal mass 4.8x 4.1x 6.7cm  #Left adnexal mass 3.4cm  -incidental finding on ct  - pelvic ultrasound attempted- unable to perform due pt cognitive function  - can be Follow up as an out pt, ob gyn consult prn  - F/u     activity: ambulate as tolerated  diet: dysphagia 1   dvt ppx: lvx  gi ppx: non indicated  dispo: acute

## 2021-07-12 NOTE — PROGRESS NOTE ADULT - SUBJECTIVE AND OBJECTIVE BOX
JOSS HERNANDEZ  31y  Female  afebrile  ate well  vomited once yesterday afternoon  us- unable to complete  wbc higher    INTERVAL EVENTS:    T(C): 36.2 (07-12-21 @ 05:00), Max: 36.4 (07-11-21 @ 13:54)  HR: 72 (07-12-21 @ 05:00) (72 - 97)  BP: 127/68 (07-12-21 @ 05:00) (98/62 - 129/70)  RR: 18 (07-12-21 @ 05:00) (16 - 18)  SpO2: 99% (07-11-21 @ 20:30) (99% - 99%)  Wt(kg): --Vital Signs Last 24 Hrs  T(C): 36.2 (12 Jul 2021 05:00), Max: 36.4 (11 Jul 2021 13:54)  T(F): 97.1 (12 Jul 2021 05:00), Max: 97.6 (11 Jul 2021 13:54)  HR: 72 (12 Jul 2021 05:00) (72 - 97)  BP: 127/68 (12 Jul 2021 05:00) (98/62 - 129/70)  BP(mean): --  RR: 18 (12 Jul 2021 05:00) (16 - 18)  SpO2: 99% (11 Jul 2021 20:30) (99% - 99%)    PHYSICAL EXAM:  GENERAL: resting comfortably, curled in bed  NECK: Supple, No JVD, Normal thyroid  CHEST/LUNG: Clear; No crackles or wheezing  HEART: S1, S2, Regular rate and rhythm;   ABDOMEN: Soft, Nontender, Nondistended; Bowel sounds present  EXTREMITIES: No clubbing, cyanosis, or edema  SKIN: No rashes or lesions    LABS:                          10.5   19.09 )-----------( 264      ( 11 Jul 2021 06:56 )             33.2     07-11    138  |  103  |  5<L>  ----------------------------<  67<L>  3.8   |  24  |  0.5<L>    Ca    9.2      11 Jul 2021 06:56  Mg     1.8     07-11    TPro  6.1  /  Alb  3.7  /  TBili  0.5  /  DBili  x   /  AST  21  /  ALT  27  /  AlkPhos  66  07-11          Culture - Blood (collected 10 Jul 2021 14:10)  Source: .Blood Blood-Peripheral  Preliminary Report (11 Jul 2021 19:01):    No growth to date.    Culture - Blood (collected 10 Jul 2021 14:10)  Source: .Blood Blood-Peripheral  Preliminary Report (11 Jul 2021 19:01):    No growth to date.          acetaminophen   Tablet .. 650 milliGRAM(s) Oral every 6 hours PRN  artificial tears (preservative free) Ophthalmic Solution 1 Drop(s) Both EYES three times a day PRN  azithromycin  IVPB      azithromycin  IVPB 500 milliGRAM(s) IV Intermittent every 24 hours  benztropine 0.5 milliGRAM(s) Oral every 6 hours  cefTRIAXone   IVPB 1000 milliGRAM(s) IV Intermittent every 24 hours  cefTRIAXone   IVPB      diphenhydrAMINE 50 milliGRAM(s) Oral every 6 hours PRN  enoxaparin Injectable 40 milliGRAM(s) SubCutaneous daily  lactated ringers. 1000 milliLiter(s) IV Continuous <Continuous>  lamoTRIgine 75 milliGRAM(s) Oral two times a day  levETIRAcetam 500 milliGRAM(s) Oral two times a day  LORazepam     Tablet 1 milliGRAM(s) Oral three times a day  melatonin 5 milliGRAM(s) Oral at bedtime PRN  ondansetron Injectable 4 milliGRAM(s) IV Push every 4 hours PRN  polyethylene glycol 3350 17 Gram(s) Oral two times a day  senna 2 Tablet(s) Oral at bedtime      RADIOLOGY & ADDITIONAL TESTS:    case discussed with the resident  Available consult notes reviewed    Assessment and plan:     all cultures neg  no cough  elevated wbc  get ID consult  ca 125  monitor bms             JOSS HERNANDEZ  31y  Female  afebrile  ate well  vomited once yesterday afternoon  us- unable to complete  wbc higher    INTERVAL EVENTS:    T(C): 36.2 (07-12-21 @ 05:00), Max: 36.4 (07-11-21 @ 13:54)  HR: 72 (07-12-21 @ 05:00) (72 - 97)  BP: 127/68 (07-12-21 @ 05:00) (98/62 - 129/70)  RR: 18 (07-12-21 @ 05:00) (16 - 18)  SpO2: 99% (07-11-21 @ 20:30) (99% - 99%)  Wt(kg): --Vital Signs Last 24 Hrs  T(C): 36.2 (12 Jul 2021 05:00), Max: 36.4 (11 Jul 2021 13:54)  T(F): 97.1 (12 Jul 2021 05:00), Max: 97.6 (11 Jul 2021 13:54)  HR: 72 (12 Jul 2021 05:00) (72 - 97)  BP: 127/68 (12 Jul 2021 05:00) (98/62 - 129/70)  BP(mean): --  RR: 18 (12 Jul 2021 05:00) (16 - 18)  SpO2: 99% (11 Jul 2021 20:30) (99% - 99%)    PHYSICAL EXAM:  GENERAL: resting comfortably, curled in bed  NECK: Supple, No JVD, Normal thyroid  CHEST/LUNG: Clear; No crackles or wheezing  HEART: S1, S2, Regular rate and rhythm;   ABDOMEN: Soft, Nontender, Nondistended; Bowel sounds present  EXTREMITIES: No clubbing, cyanosis, or edema  SKIN: No rashes or lesions    LABS:                          10.5   19.09 )-----------( 264      ( 11 Jul 2021 06:56 )             33.2     07-11    138  |  103  |  5<L>  ----------------------------<  67<L>  3.8   |  24  |  0.5<L>    Ca    9.2      11 Jul 2021 06:56  Mg     1.8     07-11    TPro  6.1  /  Alb  3.7  /  TBili  0.5  /  DBili  x   /  AST  21  /  ALT  27  /  AlkPhos  66  07-11          Culture - Blood (collected 10 Jul 2021 14:10)  Source: .Blood Blood-Peripheral  Preliminary Report (11 Jul 2021 19:01):    No growth to date.    Culture - Blood (collected 10 Jul 2021 14:10)  Source: .Blood Blood-Peripheral  Preliminary Report (11 Jul 2021 19:01):    No growth to date.          acetaminophen   Tablet .. 650 milliGRAM(s) Oral every 6 hours PRN  artificial tears (preservative free) Ophthalmic Solution 1 Drop(s) Both EYES three times a day PRN  azithromycin  IVPB      azithromycin  IVPB 500 milliGRAM(s) IV Intermittent every 24 hours  benztropine 0.5 milliGRAM(s) Oral every 6 hours  cefTRIAXone   IVPB 1000 milliGRAM(s) IV Intermittent every 24 hours  cefTRIAXone   IVPB      diphenhydrAMINE 50 milliGRAM(s) Oral every 6 hours PRN  enoxaparin Injectable 40 milliGRAM(s) SubCutaneous daily  lactated ringers. 1000 milliLiter(s) IV Continuous <Continuous>  lamoTRIgine 75 milliGRAM(s) Oral two times a day  levETIRAcetam 500 milliGRAM(s) Oral two times a day  LORazepam     Tablet 1 milliGRAM(s) Oral three times a day  melatonin 5 milliGRAM(s) Oral at bedtime PRN  ondansetron Injectable 4 milliGRAM(s) IV Push every 4 hours PRN  polyethylene glycol 3350 17 Gram(s) Oral two times a day  senna 2 Tablet(s) Oral at bedtime      RADIOLOGY & ADDITIONAL TESTS:    case discussed with the resident  Available consult notes reviewed    Assessment and plan:     all cultures neg  no cough  elevated wbc  get ID consult  ca 125  monitor bms  obtain ua  decreased hgb- monitor, plts relatively stable

## 2021-07-13 LAB
ALBUMIN SERPL ELPH-MCNC: 3.5 G/DL — SIGNIFICANT CHANGE UP (ref 3.5–5.2)
ALBUMIN SERPL ELPH-MCNC: 4 G/DL — SIGNIFICANT CHANGE UP (ref 3.5–5.2)
ALP SERPL-CCNC: 48 U/L — SIGNIFICANT CHANGE UP (ref 30–115)
ALP SERPL-CCNC: 57 U/L — SIGNIFICANT CHANGE UP (ref 30–115)
ALT FLD-CCNC: 20 U/L — SIGNIFICANT CHANGE UP (ref 0–41)
ALT FLD-CCNC: 22 U/L — SIGNIFICANT CHANGE UP (ref 0–41)
ANION GAP SERPL CALC-SCNC: 11 MMOL/L — SIGNIFICANT CHANGE UP (ref 7–14)
ANION GAP SERPL CALC-SCNC: 8 MMOL/L — SIGNIFICANT CHANGE UP (ref 7–14)
AST SERPL-CCNC: 13 U/L — SIGNIFICANT CHANGE UP (ref 0–41)
AST SERPL-CCNC: 13 U/L — SIGNIFICANT CHANGE UP (ref 0–41)
BASOPHILS # BLD AUTO: 0.13 K/UL — SIGNIFICANT CHANGE UP (ref 0–0.2)
BASOPHILS NFR BLD AUTO: 1.4 % — HIGH (ref 0–1)
BILIRUB SERPL-MCNC: <0.2 MG/DL — SIGNIFICANT CHANGE UP (ref 0.2–1.2)
BILIRUB SERPL-MCNC: <0.2 MG/DL — SIGNIFICANT CHANGE UP (ref 0.2–1.2)
BUN SERPL-MCNC: 3 MG/DL — LOW (ref 10–20)
BUN SERPL-MCNC: 5 MG/DL — LOW (ref 10–20)
CALCIUM SERPL-MCNC: 9 MG/DL — SIGNIFICANT CHANGE UP (ref 8.5–10.1)
CALCIUM SERPL-MCNC: 9.4 MG/DL — SIGNIFICANT CHANGE UP (ref 8.5–10.1)
CHLORIDE SERPL-SCNC: 105 MMOL/L — SIGNIFICANT CHANGE UP (ref 98–110)
CHLORIDE SERPL-SCNC: 106 MMOL/L — SIGNIFICANT CHANGE UP (ref 98–110)
CO2 SERPL-SCNC: 22 MMOL/L — SIGNIFICANT CHANGE UP (ref 17–32)
CO2 SERPL-SCNC: 26 MMOL/L — SIGNIFICANT CHANGE UP (ref 17–32)
CREAT SERPL-MCNC: 0.6 MG/DL — LOW (ref 0.7–1.5)
CREAT SERPL-MCNC: 0.6 MG/DL — LOW (ref 0.7–1.5)
EOSINOPHIL # BLD AUTO: 0.64 K/UL — SIGNIFICANT CHANGE UP (ref 0–0.7)
EOSINOPHIL NFR BLD AUTO: 6.9 % — SIGNIFICANT CHANGE UP (ref 0–8)
GLUCOSE SERPL-MCNC: 115 MG/DL — HIGH (ref 70–99)
GLUCOSE SERPL-MCNC: 85 MG/DL — SIGNIFICANT CHANGE UP (ref 70–99)
HCT VFR BLD CALC: 35 % — LOW (ref 37–47)
HGB BLD-MCNC: 11.1 G/DL — LOW (ref 12–16)
IMM GRANULOCYTES NFR BLD AUTO: 0.3 % — SIGNIFICANT CHANGE UP (ref 0.1–0.3)
LYMPHOCYTES # BLD AUTO: 2.94 K/UL — SIGNIFICANT CHANGE UP (ref 1.2–3.4)
LYMPHOCYTES # BLD AUTO: 31.5 % — SIGNIFICANT CHANGE UP (ref 20.5–51.1)
MAGNESIUM SERPL-MCNC: 1.6 MG/DL — LOW (ref 1.8–2.4)
MAGNESIUM SERPL-MCNC: 2.4 MG/DL — SIGNIFICANT CHANGE UP (ref 1.8–2.4)
MCHC RBC-ENTMCNC: 27.1 PG — SIGNIFICANT CHANGE UP (ref 27–31)
MCHC RBC-ENTMCNC: 31.7 G/DL — LOW (ref 32–37)
MCV RBC AUTO: 85.6 FL — SIGNIFICANT CHANGE UP (ref 81–99)
MONOCYTES # BLD AUTO: 1.17 K/UL — HIGH (ref 0.1–0.6)
MONOCYTES NFR BLD AUTO: 12.5 % — HIGH (ref 1.7–9.3)
NEUTROPHILS # BLD AUTO: 4.43 K/UL — SIGNIFICANT CHANGE UP (ref 1.4–6.5)
NEUTROPHILS NFR BLD AUTO: 47.4 % — SIGNIFICANT CHANGE UP (ref 42.2–75.2)
NRBC # BLD: 0 /100 WBCS — SIGNIFICANT CHANGE UP (ref 0–0)
PLATELET # BLD AUTO: 319 K/UL — SIGNIFICANT CHANGE UP (ref 130–400)
POTASSIUM SERPL-MCNC: 4 MMOL/L — SIGNIFICANT CHANGE UP (ref 3.5–5)
POTASSIUM SERPL-MCNC: 4.3 MMOL/L — SIGNIFICANT CHANGE UP (ref 3.5–5)
POTASSIUM SERPL-SCNC: 4 MMOL/L — SIGNIFICANT CHANGE UP (ref 3.5–5)
POTASSIUM SERPL-SCNC: 4.3 MMOL/L — SIGNIFICANT CHANGE UP (ref 3.5–5)
PROT SERPL-MCNC: 5.7 G/DL — LOW (ref 6–8)
PROT SERPL-MCNC: 6.6 G/DL — SIGNIFICANT CHANGE UP (ref 6–8)
RBC # BLD: 4.09 M/UL — LOW (ref 4.2–5.4)
RBC # FLD: 12.5 % — SIGNIFICANT CHANGE UP (ref 11.5–14.5)
SODIUM SERPL-SCNC: 139 MMOL/L — SIGNIFICANT CHANGE UP (ref 135–146)
SODIUM SERPL-SCNC: 139 MMOL/L — SIGNIFICANT CHANGE UP (ref 135–146)
WBC # BLD: 9.34 K/UL — SIGNIFICANT CHANGE UP (ref 4.8–10.8)
WBC # FLD AUTO: 9.34 K/UL — SIGNIFICANT CHANGE UP (ref 4.8–10.8)

## 2021-07-13 RX ORDER — MAGNESIUM SULFATE 500 MG/ML
2 VIAL (ML) INJECTION ONCE
Refills: 0 | Status: COMPLETED | OUTPATIENT
Start: 2021-07-13 | End: 2021-07-13

## 2021-07-13 RX ORDER — LEVETIRACETAM 250 MG/1
500 TABLET, FILM COATED ORAL
Refills: 0 | Status: DISCONTINUED | OUTPATIENT
Start: 2021-07-13 | End: 2021-07-19

## 2021-07-13 RX ORDER — SODIUM CHLORIDE 9 MG/ML
500 INJECTION, SOLUTION INTRAVENOUS ONCE
Refills: 0 | Status: COMPLETED | OUTPATIENT
Start: 2021-07-13 | End: 2021-07-13

## 2021-07-13 RX ADMIN — Medication 0.5 MILLIGRAM(S): at 05:49

## 2021-07-13 RX ADMIN — PIPERACILLIN AND TAZOBACTAM 200 GRAM(S): 4; .5 INJECTION, POWDER, LYOPHILIZED, FOR SOLUTION INTRAVENOUS at 05:50

## 2021-07-13 RX ADMIN — PIPERACILLIN AND TAZOBACTAM 200 GRAM(S): 4; .5 INJECTION, POWDER, LYOPHILIZED, FOR SOLUTION INTRAVENOUS at 23:07

## 2021-07-13 RX ADMIN — ENOXAPARIN SODIUM 40 MILLIGRAM(S): 100 INJECTION SUBCUTANEOUS at 11:12

## 2021-07-13 RX ADMIN — Medication 0.5 MILLIGRAM(S): at 11:12

## 2021-07-13 RX ADMIN — LEVETIRACETAM 500 MILLIGRAM(S): 250 TABLET, FILM COATED ORAL at 17:23

## 2021-07-13 RX ADMIN — SENNA PLUS 2 TABLET(S): 8.6 TABLET ORAL at 21:30

## 2021-07-13 RX ADMIN — POLYETHYLENE GLYCOL 3350 17 GRAM(S): 17 POWDER, FOR SOLUTION ORAL at 17:23

## 2021-07-13 RX ADMIN — LAMOTRIGINE 75 MILLIGRAM(S): 25 TABLET, ORALLY DISINTEGRATING ORAL at 05:49

## 2021-07-13 RX ADMIN — LEVETIRACETAM 500 MILLIGRAM(S): 250 TABLET, FILM COATED ORAL at 05:50

## 2021-07-13 RX ADMIN — Medication 0.5 MILLIGRAM(S): at 17:27

## 2021-07-13 RX ADMIN — Medication 1 MILLIGRAM(S): at 07:40

## 2021-07-13 RX ADMIN — Medication 0.5 MILLIGRAM(S): at 23:08

## 2021-07-13 RX ADMIN — PIPERACILLIN AND TAZOBACTAM 200 GRAM(S): 4; .5 INJECTION, POWDER, LYOPHILIZED, FOR SOLUTION INTRAVENOUS at 17:27

## 2021-07-13 RX ADMIN — PIPERACILLIN AND TAZOBACTAM 200 GRAM(S): 4; .5 INJECTION, POWDER, LYOPHILIZED, FOR SOLUTION INTRAVENOUS at 11:12

## 2021-07-13 RX ADMIN — POLYETHYLENE GLYCOL 3350 17 GRAM(S): 17 POWDER, FOR SOLUTION ORAL at 05:50

## 2021-07-13 RX ADMIN — Medication 1 MILLIGRAM(S): at 13:28

## 2021-07-13 RX ADMIN — Medication 1 MILLIGRAM(S): at 21:32

## 2021-07-13 RX ADMIN — LAMOTRIGINE 75 MILLIGRAM(S): 25 TABLET, ORALLY DISINTEGRATING ORAL at 17:23

## 2021-07-13 RX ADMIN — Medication 50 GRAM(S): at 11:13

## 2021-07-13 RX ADMIN — SODIUM CHLORIDE 500 MILLILITER(S): 9 INJECTION, SOLUTION INTRAVENOUS at 05:49

## 2021-07-13 NOTE — PROGRESS NOTE ADULT - SUBJECTIVE AND OBJECTIVE BOX
SUBJECTIVE:    Patient is a 31y old Female who presents with a chief complaint of aspiration pneumonia (12 Jul 2021 16:28)    Currently admitted to medicine with the primary diagnosis of Pneumonia    Today is hospital day 3d. This morning she is resting comfortably in bed. No overnight events. Aid at the bedside reports cough, denies vomiting.     PAST MEDICAL & SURGICAL HISTORY  Mental retardation  profound    Seizure      ALLERGIES:  No Known Allergies    MEDICATIONS:  STANDING MEDICATIONS  benztropine 0.5 milliGRAM(s) Oral every 6 hours  enoxaparin Injectable 40 milliGRAM(s) SubCutaneous daily  lamoTRIgine 75 milliGRAM(s) Oral two times a day  levETIRAcetam  Solution 500 milliGRAM(s) Oral two times a day  LORazepam     Tablet 1 milliGRAM(s) Oral three times a day  magnesium sulfate  IVPB 2 Gram(s) IV Intermittent once  piperacillin/tazobactam IVPB.. 3.375 Gram(s) IV Intermittent every 6 hours  polyethylene glycol 3350 17 Gram(s) Oral two times a day  senna 2 Tablet(s) Oral at bedtime    PRN MEDICATIONS  acetaminophen   Tablet .. 650 milliGRAM(s) Oral every 6 hours PRN  artificial tears (preservative free) Ophthalmic Solution 1 Drop(s) Both EYES three times a day PRN  diphenhydrAMINE 50 milliGRAM(s) Oral every 6 hours PRN  melatonin 5 milliGRAM(s) Oral at bedtime PRN  ondansetron Injectable 4 milliGRAM(s) IV Push every 4 hours PRN    VITALS:   T(F): 97  HR: 58  BP: 125/73  RR: 18  SpO2: 97%    LABS:    07-12    139  |  106  |  5<L>  ----------------------------<  115<H>  4.3   |  22  |  0.6<L>    Ca    9.0      12 Jul 2021 23:30  Mg     1.6     07-12    TPro  5.7<L>  /  Alb  3.5  /  TBili  <0.2  /  DBili  x   /  AST  13  /  ALT  20  /  AlkPhos  48  07-12              Culture - Blood (collected 10 Jul 2021 14:10)  Source: .Blood Blood-Peripheral  Preliminary Report (11 Jul 2021 19:01):    No growth to date.    Culture - Blood (collected 10 Jul 2021 14:10)  Source: .Blood Blood-Peripheral  Preliminary Report (11 Jul 2021 19:01):    No growth to date.          RADIOLOGY:    PHYSICAL EXAM:  GEN: No acute distress  LUNGS: Clear to auscultation bilaterally   HEART: Regular  ABD: Soft, non-tender, non-distended.  EXT: NC/NC/NE/2+PP/LAWLER/Skin Intact.   NEURO: AAOX3     SUBJECTIVE:    Patient is a 31y old Female who presents with a chief complaint of aspiration pneumonia (12 Jul 2021 16:28)    Currently admitted to medicine with the primary diagnosis of Pneumonia    Today is hospital day 3d. This morning she is resting comfortably in bed. No overnight events. Aid at the bedside reports cough, denies vomiting.     PAST MEDICAL & SURGICAL HISTORY  Mental retardation  profound    Seizure      ALLERGIES:  No Known Allergies    MEDICATIONS:  STANDING MEDICATIONS  benztropine 0.5 milliGRAM(s) Oral every 6 hours  enoxaparin Injectable 40 milliGRAM(s) SubCutaneous daily  lamoTRIgine 75 milliGRAM(s) Oral two times a day  levETIRAcetam  Solution 500 milliGRAM(s) Oral two times a day  LORazepam     Tablet 1 milliGRAM(s) Oral three times a day  magnesium sulfate  IVPB 2 Gram(s) IV Intermittent once  piperacillin/tazobactam IVPB.. 3.375 Gram(s) IV Intermittent every 6 hours  polyethylene glycol 3350 17 Gram(s) Oral two times a day  senna 2 Tablet(s) Oral at bedtime    PRN MEDICATIONS  acetaminophen   Tablet .. 650 milliGRAM(s) Oral every 6 hours PRN  artificial tears (preservative free) Ophthalmic Solution 1 Drop(s) Both EYES three times a day PRN  diphenhydrAMINE 50 milliGRAM(s) Oral every 6 hours PRN  melatonin 5 milliGRAM(s) Oral at bedtime PRN  ondansetron Injectable 4 milliGRAM(s) IV Push every 4 hours PRN    VITALS:   T(F): 97  HR: 58  BP: 125/73  RR: 18  SpO2: 97%    LABS:    07-12    139  |  106  |  5<L>  ----------------------------<  115<H>  4.3   |  22  |  0.6<L>    Ca    9.0      12 Jul 2021 23:30  Mg     1.6     07-12    TPro  5.7<L>  /  Alb  3.5  /  TBili  <0.2  /  DBili  x   /  AST  13  /  ALT  20  /  AlkPhos  48  07-12              Culture - Blood (collected 10 Jul 2021 14:10)  Source: .Blood Blood-Peripheral  Preliminary Report (11 Jul 2021 19:01):    No growth to date.    Culture - Blood (collected 10 Jul 2021 14:10)  Source: .Blood Blood-Peripheral  Preliminary Report (11 Jul 2021 19:01):    No growth to date.          RADIOLOGY:    PHYSICAL EXAM:  GEN: No acute distress  LUNGS: Clear to auscultation bilaterally, no wheezes, rales, rhonchi  HEART: Regular rate and rhythm +s1 s2  ABD: Soft, non-tender, non-distended.   EXT: no LE edema/2+PP  NEURO: unable to assess

## 2021-07-13 NOTE — PROGRESS NOTE ADULT - SUBJECTIVE AND OBJECTIVE BOX
DAVID, JOSS  31y, Female    All available historical data reviewed    OVERNIGHT EVENTS:  no fevers      ROS:  unable to obtain history secondary to patient's mental status and/or sedation   VITALS:  T(F): 97, Max: 97.3 (07-12-21 @ 20:03)  HR: 58  BP: 125/73  RR: 18Vital Signs Last 24 Hrs  T(C): 36.1 (13 Jul 2021 05:24), Max: 36.3 (12 Jul 2021 20:03)  T(F): 97 (13 Jul 2021 05:24), Max: 97.3 (12 Jul 2021 20:03)  HR: 58 (13 Jul 2021 07:10) (58 - 75)  BP: 125/73 (13 Jul 2021 07:10) (83/49 - 125/73)  BP(mean): --  RR: 18 (13 Jul 2021 07:10) (15 - 18)  SpO2: 97% (12 Jul 2021 21:35) (97% - 97%)    TESTS & MEASUREMENTS:    07-12    139  |  106  |  5<L>  ----------------------------<  115<H>  4.3   |  22  |  0.6<L>    Ca    9.0      12 Jul 2021 23:30  Mg     1.6     07-12    TPro  5.7<L>  /  Alb  3.5  /  TBili  <0.2  /  DBili  x   /  AST  13  /  ALT  20  /  AlkPhos  48  07-12    LIVER FUNCTIONS - ( 12 Jul 2021 23:30 )  Alb: 3.5 g/dL / Pro: 5.7 g/dL / ALK PHOS: 48 U/L / ALT: 20 U/L / AST: 13 U/L / GGT: x             Culture - Blood (collected 07-10-21 @ 14:10)  Source: .Blood Blood-Peripheral  Preliminary Report (07-11-21 @ 19:01):    No growth to date.    Culture - Blood (collected 07-10-21 @ 14:10)  Source: .Blood Blood-Peripheral  Preliminary Report (07-11-21 @ 19:01):    No growth to date.            RADIOLOGY & ADDITIONAL TESTS:  Personal review of radiological diagnostics performed  Echo and EKG results noted when applicable.     MEDICATIONS:  acetaminophen   Tablet .. 650 milliGRAM(s) Oral every 6 hours PRN  artificial tears (preservative free) Ophthalmic Solution 1 Drop(s) Both EYES three times a day PRN  benztropine 0.5 milliGRAM(s) Oral every 6 hours  diphenhydrAMINE 50 milliGRAM(s) Oral every 6 hours PRN  enoxaparin Injectable 40 milliGRAM(s) SubCutaneous daily  lamoTRIgine 75 milliGRAM(s) Oral two times a day  levETIRAcetam  Solution 500 milliGRAM(s) Oral two times a day  LORazepam     Tablet 1 milliGRAM(s) Oral three times a day  magnesium sulfate  IVPB 2 Gram(s) IV Intermittent once  melatonin 5 milliGRAM(s) Oral at bedtime PRN  ondansetron Injectable 4 milliGRAM(s) IV Push every 4 hours PRN  piperacillin/tazobactam IVPB.. 3.375 Gram(s) IV Intermittent every 6 hours  polyethylene glycol 3350 17 Gram(s) Oral two times a day  senna 2 Tablet(s) Oral at bedtime      ANTIBIOTICS:  piperacillin/tazobactam IVPB.. 3.375 Gram(s) IV Intermittent every 6 hours

## 2021-07-13 NOTE — PROGRESS NOTE ADULT - ASSESSMENT
Patient is a 32 y/o female with Pmhx MR non verbal at baseline, seizure disorder presented to the hospital for non-bilious non-bloody vomiting x 1 day. Patient is non verbal at baseline, she is from [facility] provided by group home staff Mr. Jim Simmons. Per group home staff patient was recently started on PO ativan for her seizure disorder. Yesterday after breakfast patient throw up x 2. After pt throw up pt started coughing so today she was brought to the hospital for further evaluation. No fever, chills, diarrhea, seizure like activity or weakness. however per group home aid patient is sleepier than usual    head CT negative. Patient was found have  aspiration pneumonia and CXR and CT. she also has a incidental finding of adnexal mass on CT scan.    #aspiration pneumonia  - found to have aspiration pneumonia on CXR and CT on admission  - NBNB vomiting x 2>>> currently vomiting resolved  - SIRS negative on admission. afebrile. WBC 12>19  - blood culture NGTD  - speech and swallow eval- dysphagia 1 started   - zofran PRN for nausea  - s/p azithromycin and ceftriaxone in the ED. d/c ceftriaxone 1g IV q24hr & azithromycin 200mg q24hrs,   - c/w Zosyn 3.375 gm iv q6h as per ID    #r/o seizure disorder  -patient has a pmhx of seizure disorder, medication regime recently changed, started on ativan  -c/w home meds  -EEG this admission to r/o seizure  -neuro eval if eeg positive    # right adnexal mass 4.8x 4.1x 6.7cm  #Left adnexal mass 3.4cm  -incidental finding on ct  - pelvic ultrasound attempted- unable to perform due pt cognitive function  - can be Follow up as an out pt, ob gyn consult prn  - F/u     activity: ambulate as tolerated  diet: dysphagia 1   dvt ppx: lvx  gi ppx: non indicated  dispo: acute     Patient is a 32 y/o female with Pmhx MR non verbal at baseline, seizure disorder presented to the hospital for non-bilious non-bloody vomiting x 1 day. Patient is non verbal at baseline, she is from [facility] provided by group home staff Mr. Jim Simmons. Per group home staff patient was recently started on PO ativan for her seizure disorder. Yesterday after breakfast patient throw up x 2. After pt throw up pt started coughing so today she was brought to the hospital for further evaluation. No fever, chills, diarrhea, seizure like activity or weakness. however per group home aid patient is sleepier than usual    head CT negative. Patient was found have  aspiration pneumonia and CXR and CT. she also has a incidental finding of adnexal mass on CT scan.    #aspiration pneumonia  - found to have aspiration pneumonia on CXR and CT on admission  - NBNB vomiting x 2>>> currently vomiting resolved  - SIRS negative on admission. afebrile. WBC 12>19  - blood culture NGTD  - speech and swallow eval- dysphagia 1 started   - zofran PRN for nausea  - s/p azithromycin and ceftriaxone in the ED. d/c ceftriaxone 1g IV q24hr & azithromycin 200mg q24hrs,   - c/w Zosyn 3.375 gm iv q6h as per ID    #r/o seizure disorder  -patient has a pmhx of seizure disorder, medication regime recently changed, started on ativan  -c/w home meds  -f/u EEG to r/o seizure  -neuro eval if eeg positive    # right adnexal mass 4.8x 4.1x 6.7cm  #Left adnexal mass 3.4cm   -incidental finding on ct  - pelvic ultrasound attempted- unable to perform due pt cognitive function  - can be Follow up as an out pt, ob gyn consult prn  - F/u     activity: ambulate as tolerated  diet: dysphagia 1   dvt ppx: lvx  gi ppx: non indicated  dispo: acute

## 2021-07-13 NOTE — PROGRESS NOTE ADULT - SUBJECTIVE AND OBJECTIVE BOX
JOSS HERNANDEZ  31y  Female  afebrile  ID appreciated antibiotics changed  monitor wbc, hgb  needs ca 125    INTERVAL EVENTS:    T(C): 35.6 (07-13-21 @ 12:09), Max: 36.3 (07-12-21 @ 20:03)  HR: 60 (07-13-21 @ 12:09) (58 - 75)  BP: 127/71 (07-13-21 @ 12:09) (83/49 - 127/71)  RR: 15 (07-13-21 @ 12:09) (15 - 18)  SpO2: 97% (07-12-21 @ 21:35) (97% - 97%)  Wt(kg): --Vital Signs Last 24 Hrs  T(C): 35.6 (13 Jul 2021 12:09), Max: 36.3 (12 Jul 2021 20:03)  T(F): 96 (13 Jul 2021 12:09), Max: 97.3 (12 Jul 2021 20:03)  HR: 60 (13 Jul 2021 12:09) (58 - 75)  BP: 127/71 (13 Jul 2021 12:09) (83/49 - 127/71)  BP(mean): --  RR: 15 (13 Jul 2021 12:09) (15 - 18)  SpO2: 97% (12 Jul 2021 21:35) (97% - 97%)    PHYSICAL EXAM:  GENERAL: resting comfortably  NECK: Supple, No JVD, Normal thyroid  CHEST/LUNG: Clear; No crackles or wheezing  HEART: S1, S2, Regular rate and rhythm;   ABDOMEN: Soft, Nontender, Nondistended; Bowel sounds present  EXTREMITIES: No clubbing, cyanosis, or edema  SKIN: No rashes or lesions    LABS:      07-12    139  |  106  |  5<L>  ----------------------------<  115<H>  4.3   |  22  |  0.6<L>    Ca    9.0      12 Jul 2021 23:30  Mg     1.6     07-12    TPro  5.7<L>  /  Alb  3.5  /  TBili  <0.2  /  DBili  x   /  AST  13  /  ALT  20  /  AlkPhos  48  07-12          Culture - Blood (collected 10 Jul 2021 14:10)  Source: .Blood Blood-Peripheral  Preliminary Report (11 Jul 2021 19:01):    No growth to date.    Culture - Blood (collected 10 Jul 2021 14:10)  Source: .Blood Blood-Peripheral  Preliminary Report (11 Jul 2021 19:01):    No growth to date.          acetaminophen   Tablet .. 650 milliGRAM(s) Oral every 6 hours PRN  artificial tears (preservative free) Ophthalmic Solution 1 Drop(s) Both EYES three times a day PRN  benztropine 0.5 milliGRAM(s) Oral every 6 hours  diphenhydrAMINE 50 milliGRAM(s) Oral every 6 hours PRN  enoxaparin Injectable 40 milliGRAM(s) SubCutaneous daily  lamoTRIgine 75 milliGRAM(s) Oral two times a day  levETIRAcetam  Solution 500 milliGRAM(s) Oral two times a day  LORazepam     Tablet 1 milliGRAM(s) Oral three times a day  melatonin 5 milliGRAM(s) Oral at bedtime PRN  ondansetron Injectable 4 milliGRAM(s) IV Push every 4 hours PRN  piperacillin/tazobactam IVPB.. 3.375 Gram(s) IV Intermittent every 6 hours  polyethylene glycol 3350 17 Gram(s) Oral two times a day  senna 2 Tablet(s) Oral at bedtime      RADIOLOGY & ADDITIONAL TESTS:    case discussed with the resident  Available consult notes reviewed    Assessment and plan:       no vomiting  never had ua- now too late after antibiotics  f/u cbc  ID appreciated

## 2021-07-13 NOTE — PROGRESS NOTE ADULT - ASSESSMENT
· Assessment	  30 y/o female with Pmhx MR non verbal at baseline, seizure disorder presented to the hospital for non-bilious non-bloody vomiting x 1 day. Patient is non verbal at baseline, she is from [facility] provided by group home staff Mr. Jim Simmons. Per group home staff patient was recently started on PO ativan for her seizure disorder. Yesterday after breakfast patient throw up x 2. After pt throw up pt started coughing so today she was brought to the hospital for further evaluation. No fever, chills, diarrhea, seizure like activity or weakness.    IMPRESSION;  #Aspiration with probable bacterial PNA RML with leucocytosis  7/10 CT A/P: Right middle lobe and scattered bilateral lower lobe opacities.  7/10 BCx NGTD  # CT : Approximate 4.8 cm x 4.1 x 6.7 cm solid complex right adnexal mass.   Pelvic sonogram could not be performed    RECOMMENDATIONS;  Zosyn 3.375 gm iv q6h

## 2021-07-14 LAB
ALBUMIN SERPL ELPH-MCNC: 4.2 G/DL — SIGNIFICANT CHANGE UP (ref 3.5–5.2)
ALP SERPL-CCNC: 56 U/L — SIGNIFICANT CHANGE UP (ref 30–115)
ALT FLD-CCNC: 21 U/L — SIGNIFICANT CHANGE UP (ref 0–41)
ANION GAP SERPL CALC-SCNC: 12 MMOL/L — SIGNIFICANT CHANGE UP (ref 7–14)
AST SERPL-CCNC: 13 U/L — SIGNIFICANT CHANGE UP (ref 0–41)
BASOPHILS # BLD AUTO: 0.14 K/UL — SIGNIFICANT CHANGE UP (ref 0–0.2)
BASOPHILS NFR BLD AUTO: 1.3 % — HIGH (ref 0–1)
BILIRUB SERPL-MCNC: 0.2 MG/DL — SIGNIFICANT CHANGE UP (ref 0.2–1.2)
BUN SERPL-MCNC: 3 MG/DL — LOW (ref 10–20)
CALCIUM SERPL-MCNC: 9.3 MG/DL — SIGNIFICANT CHANGE UP (ref 8.5–10.1)
CANCER AG125 SERPL-ACNC: 8 U/ML — SIGNIFICANT CHANGE UP
CHLORIDE SERPL-SCNC: 105 MMOL/L — SIGNIFICANT CHANGE UP (ref 98–110)
CO2 SERPL-SCNC: 22 MMOL/L — SIGNIFICANT CHANGE UP (ref 17–32)
CREAT SERPL-MCNC: 0.7 MG/DL — SIGNIFICANT CHANGE UP (ref 0.7–1.5)
EOSINOPHIL # BLD AUTO: 0.77 K/UL — HIGH (ref 0–0.7)
EOSINOPHIL NFR BLD AUTO: 7.4 % — SIGNIFICANT CHANGE UP (ref 0–8)
GLUCOSE SERPL-MCNC: 93 MG/DL — SIGNIFICANT CHANGE UP (ref 70–99)
HCT VFR BLD CALC: 35.2 % — LOW (ref 37–47)
HGB BLD-MCNC: 11 G/DL — LOW (ref 12–16)
IMM GRANULOCYTES NFR BLD AUTO: 0.4 % — HIGH (ref 0.1–0.3)
LYMPHOCYTES # BLD AUTO: 3.29 K/UL — SIGNIFICANT CHANGE UP (ref 1.2–3.4)
LYMPHOCYTES # BLD AUTO: 31.7 % — SIGNIFICANT CHANGE UP (ref 20.5–51.1)
MAGNESIUM SERPL-MCNC: 1.8 MG/DL — SIGNIFICANT CHANGE UP (ref 1.8–2.4)
MCHC RBC-ENTMCNC: 26.8 PG — LOW (ref 27–31)
MCHC RBC-ENTMCNC: 31.3 G/DL — LOW (ref 32–37)
MCV RBC AUTO: 85.6 FL — SIGNIFICANT CHANGE UP (ref 81–99)
MONOCYTES # BLD AUTO: 1.38 K/UL — HIGH (ref 0.1–0.6)
MONOCYTES NFR BLD AUTO: 13.3 % — HIGH (ref 1.7–9.3)
NEUTROPHILS # BLD AUTO: 4.77 K/UL — SIGNIFICANT CHANGE UP (ref 1.4–6.5)
NEUTROPHILS NFR BLD AUTO: 45.9 % — SIGNIFICANT CHANGE UP (ref 42.2–75.2)
NRBC # BLD: 0 /100 WBCS — SIGNIFICANT CHANGE UP (ref 0–0)
PLATELET # BLD AUTO: 294 K/UL — SIGNIFICANT CHANGE UP (ref 130–400)
POTASSIUM SERPL-MCNC: 4 MMOL/L — SIGNIFICANT CHANGE UP (ref 3.5–5)
POTASSIUM SERPL-SCNC: 4 MMOL/L — SIGNIFICANT CHANGE UP (ref 3.5–5)
PROT SERPL-MCNC: 6.7 G/DL — SIGNIFICANT CHANGE UP (ref 6–8)
RBC # BLD: 4.11 M/UL — LOW (ref 4.2–5.4)
RBC # FLD: 12.5 % — SIGNIFICANT CHANGE UP (ref 11.5–14.5)
SODIUM SERPL-SCNC: 139 MMOL/L — SIGNIFICANT CHANGE UP (ref 135–146)
WBC # BLD: 10.39 K/UL — SIGNIFICANT CHANGE UP (ref 4.8–10.8)
WBC # FLD AUTO: 10.39 K/UL — SIGNIFICANT CHANGE UP (ref 4.8–10.8)

## 2021-07-14 PROCEDURE — 95816 EEG AWAKE AND DROWSY: CPT | Mod: 26

## 2021-07-14 RX ADMIN — ENOXAPARIN SODIUM 40 MILLIGRAM(S): 100 INJECTION SUBCUTANEOUS at 11:11

## 2021-07-14 RX ADMIN — POLYETHYLENE GLYCOL 3350 17 GRAM(S): 17 POWDER, FOR SOLUTION ORAL at 17:15

## 2021-07-14 RX ADMIN — PIPERACILLIN AND TAZOBACTAM 200 GRAM(S): 4; .5 INJECTION, POWDER, LYOPHILIZED, FOR SOLUTION INTRAVENOUS at 17:15

## 2021-07-14 RX ADMIN — PIPERACILLIN AND TAZOBACTAM 200 GRAM(S): 4; .5 INJECTION, POWDER, LYOPHILIZED, FOR SOLUTION INTRAVENOUS at 11:11

## 2021-07-14 RX ADMIN — SENNA PLUS 2 TABLET(S): 8.6 TABLET ORAL at 21:22

## 2021-07-14 RX ADMIN — Medication 0.5 MILLIGRAM(S): at 11:11

## 2021-07-14 RX ADMIN — PIPERACILLIN AND TAZOBACTAM 200 GRAM(S): 4; .5 INJECTION, POWDER, LYOPHILIZED, FOR SOLUTION INTRAVENOUS at 05:20

## 2021-07-14 RX ADMIN — Medication 0.5 MILLIGRAM(S): at 05:22

## 2021-07-14 RX ADMIN — LEVETIRACETAM 500 MILLIGRAM(S): 250 TABLET, FILM COATED ORAL at 17:16

## 2021-07-14 RX ADMIN — LAMOTRIGINE 75 MILLIGRAM(S): 25 TABLET, ORALLY DISINTEGRATING ORAL at 17:15

## 2021-07-14 RX ADMIN — POLYETHYLENE GLYCOL 3350 17 GRAM(S): 17 POWDER, FOR SOLUTION ORAL at 05:22

## 2021-07-14 RX ADMIN — Medication 1 MILLIGRAM(S): at 13:04

## 2021-07-14 RX ADMIN — LEVETIRACETAM 500 MILLIGRAM(S): 250 TABLET, FILM COATED ORAL at 05:22

## 2021-07-14 RX ADMIN — Medication 0.5 MILLIGRAM(S): at 17:15

## 2021-07-14 RX ADMIN — LAMOTRIGINE 75 MILLIGRAM(S): 25 TABLET, ORALLY DISINTEGRATING ORAL at 05:22

## 2021-07-14 RX ADMIN — Medication 1 MILLIGRAM(S): at 21:37

## 2021-07-14 RX ADMIN — Medication 1 MILLIGRAM(S): at 05:21

## 2021-07-14 NOTE — SWALLOW BEDSIDE ASSESSMENT ADULT - DIET PRIOR TO ADMI
chopped w/ thin liquids per GH aide at bedside
quarter inch cut up foods w/ thin liquids
puree, thins as per health aide present bedside

## 2021-07-14 NOTE — SWALLOW BEDSIDE ASSESSMENT ADULT - SWALLOW EVAL: DIAGNOSIS
mild oral dysphagia for puree and thin liquids w/o overt s/s aspiration/penetration; pt w/ decreased awareness of chewable consistency
+toleration of puree, thins w/no overt s/s of aspiration/penetration
PO trials not administered 2' pt sleeping and w/ difficulty maintaining awakeness. pt tolerated breakfast and lunch well per MIKEL Tobias

## 2021-07-14 NOTE — SWALLOW BEDSIDE ASSESSMENT ADULT - ORAL PREPARATORY PHASE
Reduced oral grading/Bolus falls into anterior sulcus decreased awareness of chewable consistency, no mastication observed, bolus removed/Reduced oral grading/Decreased mastication ability

## 2021-07-14 NOTE — PROGRESS NOTE ADULT - SUBJECTIVE AND OBJECTIVE BOX
SUBJECTIVE:    Patient is a 31y old Female who presents with a chief complaint of aspiration pneumonia (14 Jul 2021 10:58)    Currently admitted to medicine with the primary diagnosis of Pneumonia     Today is hospital day 4d. This morning she is resting comfortably in bed. No overnight events.     PAST MEDICAL & SURGICAL HISTORY  Mental retardation  profound    Seizure    ALLERGIES:  No Known Allergies    MEDICATIONS:  STANDING MEDICATIONS  benztropine 0.5 milliGRAM(s) Oral every 6 hours  enoxaparin Injectable 40 milliGRAM(s) SubCutaneous daily  lamoTRIgine 75 milliGRAM(s) Oral two times a day  levETIRAcetam  Solution 500 milliGRAM(s) Oral two times a day  LORazepam     Tablet 1 milliGRAM(s) Oral three times a day  piperacillin/tazobactam IVPB.. 3.375 Gram(s) IV Intermittent every 6 hours  polyethylene glycol 3350 17 Gram(s) Oral two times a day  senna 2 Tablet(s) Oral at bedtime    PRN MEDICATIONS  acetaminophen   Tablet .. 650 milliGRAM(s) Oral every 6 hours PRN  artificial tears (preservative free) Ophthalmic Solution 1 Drop(s) Both EYES three times a day PRN  diphenhydrAMINE 50 milliGRAM(s) Oral every 6 hours PRN  melatonin 5 milliGRAM(s) Oral at bedtime PRN  ondansetron Injectable 4 milliGRAM(s) IV Push every 4 hours PRN    VITALS:   T(F): 97.6  HR: 55  BP: 109/53  RR: 18  SpO2: 98%    LABS:                        11.0   10.39 )-----------( 294      ( 14 Jul 2021 06:40 )             35.2     07-14    139  |  105  |  3<L>  ----------------------------<  93  4.0   |  22  |  0.7    Ca    9.3      14 Jul 2021 06:40  Mg     1.8     07-14    TPro  6.7  /  Alb  4.2  /  TBili  0.2  /  DBili  x   /  AST  13  /  ALT  21  /  AlkPhos  56  07-14      RADIOLOGY:    PHYSICAL EXAM:  GEN: No acute distress  LUNGS: Clear to auscultation bilaterally, no wheezes, rales, rhonchi  HEART: Regular rate and rhythm +s1 s2  ABD: Soft, non-tender, non-distended.   EXT: no LE edema/2+PP  NEURO: unable to assess

## 2021-07-14 NOTE — SWALLOW BEDSIDE ASSESSMENT ADULT - SLP PERTINENT HISTORY OF CURRENT PROBLEM
Pt is a 32 y/o F w/ PMHx: MR non-verbal at baseline, seizure d/o presented to the hospital for non-bilious non-bloody vomiting x1 day. Per group home staff patient was recently started on PO ativan for her seizure disorder. Yesterday after breakfast patient throw up x2. pt is being treated for aspiration w/ probable bacterial PNA, RML with leucocytosis.
Patient is a 32 y/o female with Pmhx MR non verbal at baseline, seizure disorder presented to the hospital for non-bilious non-bloody vomiting x 1 day.  Right basilar peribronchial opacity.
Pt is a 30 y/o F w/ PMHx: MR non-verbal at baseline, seizure d/o presented to the hospital for non-bilious non-bloody vomiting x1 day. Per group home staff patient was recently started on PO ativan for her seizure disorder. Yesterday after breakfast patient throw up x2. pt is being treated for aspiration w/ probable bacterial PNA, RML with leucocytosis.

## 2021-07-14 NOTE — PROGRESS NOTE ADULT - ASSESSMENT
Patient is a 30 y/o female with Pmhx MR non verbal at baseline, seizure disorder presented to the hospital for non-bilious non-bloody vomiting x 1 day. Patient is non verbal at baseline, she is from [facility] provided by group home staff Mr. Jim Simmons. Per group home staff patient was recently started on PO ativan for her seizure disorder. Yesterday after breakfast patient throw up x 2. After pt throw up pt started coughing so today she was brought to the hospital for further evaluation. No fever, chills, diarrhea, seizure like activity or weakness. however per group home aid patient is sleepier than usual    head CT negative. Patient was found have  aspiration pneumonia and CXR and CT. she also has a incidental finding of adnexal mass on CT scan.    #aspiration pneumonia  - found to have aspiration pneumonia on CXR and CT on admission  - NBNB vomiting x 2>>> currently vomiting resolved  - SIRS negative on admission. afebrile. WBC 12>19>10.3  - blood culture NGTD  - speech and swallow eval- dysphagia 1 started   - zofran PRN for nausea  - s/p azithromycin and ceftriaxone in the ED. d/c ceftriaxone 1g IV q24hr & azithromycin 200mg q24hrs,   - c/w Zosyn 3.375 gm iv q6h as per ID x 7days, F/u ID if can be dc'ed on PO    #r/o seizure disorder  -patient has a pmhx of seizure disorder, medication regime recently changed, started on ativan  -c/w home meds  -EEG negative for seizure disorder    # right adnexal mass 4.8x 4.1x 6.7cm  # Left adnexal mass 3.4cm  - incidental finding on ct  - pelvic ultrasound attempted- unable to perform due pt cognitive function  - can be Follow up as an out pt with ob gyn  -  wnl    activity: ambulate as tolerated  diet: dysphagia 1   dvt ppx: lvx  gi ppx: non indicated  dispo: c/w IV abx, f/u ID if can be dc'ed on PO

## 2021-07-14 NOTE — SWALLOW BEDSIDE ASSESSMENT ADULT - SWALLOW EVAL: RECOMMENDED DIET
dysphagia 1 w/ thin liquids when pt fully awake+alert
dysphagia 1 w/ thin liquids
Dysphagia diet I puree consistency, thins 1:1

## 2021-07-14 NOTE — SWALLOW BEDSIDE ASSESSMENT ADULT - NS ASR SWALLOW FINDINGS DISCUS
MIKEL Tobias,  aide at bedside/Nursing
MIKEL Herrera/Physician/Nursing/Patient
MIKEL Brown;  sahara Dyer at bedside,  MIKEL Nichols contacted/Nursing

## 2021-07-14 NOTE — PROGRESS NOTE ADULT - SUBJECTIVE AND OBJECTIVE BOX
JOSS HERNANDEZ  31y  Female    afebrile, comfortable  ate well  wbc nl  INTERVAL EVENTS:    T(C): 36.4 (07-14-21 @ 05:00), Max: 36.4 (07-14-21 @ 05:00)  HR: 55 (07-14-21 @ 05:00) (55 - 64)  BP: 109/53 (07-14-21 @ 05:00) (91/52 - 127/71)  RR: 18 (07-14-21 @ 05:00) (15 - 18)  SpO2: 98% (07-13-21 @ 21:28) (98% - 98%)  Wt(kg): --Vital Signs Last 24 Hrs  T(C): 36.4 (14 Jul 2021 05:00), Max: 36.4 (14 Jul 2021 05:00)  T(F): 97.6 (14 Jul 2021 05:00), Max: 97.6 (14 Jul 2021 05:00)  HR: 55 (14 Jul 2021 05:00) (55 - 64)  BP: 109/53 (14 Jul 2021 05:00) (91/52 - 127/71)  BP(mean): --  RR: 18 (14 Jul 2021 05:00) (15 - 18)  SpO2: 98% (13 Jul 2021 21:28) (98% - 98%)    PHYSICAL EXAM:  GENERAL: resting comfortably,   NECK: Supple, No JVD, Normal thyroid  CHEST/LUNG: Clear; No crackles or wheezing  HEART: S1, S2, Regular rate and rhythm;   ABDOMEN: Soft, Nontender, Nondistended; Bowel sounds present  EXTREMITIES: No clubbing, cyanosis, or edema  SKIN: No rashes or lesions    LABS:                          11.0   10.39 )-----------( 294      ( 14 Jul 2021 06:40 )             35.2     07-14    139  |  105  |  3<L>  ----------------------------<  93  4.0   |  22  |  0.7    Ca    9.3      14 Jul 2021 06:40  Mg     1.8     07-14    TPro  6.7  /  Alb  4.2  /  TBili  0.2  /  DBili  x   /  AST  13  /  ALT  21  /  AlkPhos  56  07-14              acetaminophen   Tablet .. 650 milliGRAM(s) Oral every 6 hours PRN  artificial tears (preservative free) Ophthalmic Solution 1 Drop(s) Both EYES three times a day PRN  benztropine 0.5 milliGRAM(s) Oral every 6 hours  diphenhydrAMINE 50 milliGRAM(s) Oral every 6 hours PRN  enoxaparin Injectable 40 milliGRAM(s) SubCutaneous daily  lamoTRIgine 75 milliGRAM(s) Oral two times a day  levETIRAcetam  Solution 500 milliGRAM(s) Oral two times a day  LORazepam     Tablet 1 milliGRAM(s) Oral three times a day  melatonin 5 milliGRAM(s) Oral at bedtime PRN  ondansetron Injectable 4 milliGRAM(s) IV Push every 4 hours PRN  piperacillin/tazobactam IVPB.. 3.375 Gram(s) IV Intermittent every 6 hours  polyethylene glycol 3350 17 Gram(s) Oral two times a day  senna 2 Tablet(s) Oral at bedtime      RADIOLOGY & ADDITIONAL TESTS:    case discussed with the resident  Available consult notes reviewed    Assessment and plan:       tolerates antibiotics  wbc nl  ca 125 nl    ID guidance on length of iv antibiotics- when could be switched to po can go back to her group home

## 2021-07-14 NOTE — PROGRESS NOTE ADULT - SUBJECTIVE AND OBJECTIVE BOX
DAVID, JOSS  31y, Female    All available historical data reviewed    OVERNIGHT EVENTS:  no fevers  ROS:  unable to obtain history secondary to patient's mental status and/or sedation     VITALS:  T(F): 97.6, Max: 97.6 (07-14-21 @ 05:00)  HR: 55  BP: 109/53  RR: 18Vital Signs Last 24 Hrs  T(C): 36.4 (14 Jul 2021 05:00), Max: 36.4 (14 Jul 2021 05:00)  T(F): 97.6 (14 Jul 2021 05:00), Max: 97.6 (14 Jul 2021 05:00)  HR: 55 (14 Jul 2021 05:00) (55 - 64)  BP: 109/53 (14 Jul 2021 05:00) (91/52 - 127/71)  BP(mean): --  RR: 18 (14 Jul 2021 05:00) (15 - 18)  SpO2: 98% (13 Jul 2021 21:28) (98% - 98%)    TESTS & MEASUREMENTS:                        11.0   10.39 )-----------( 294      ( 14 Jul 2021 06:40 )             35.2     07-14    139  |  105  |  3<L>  ----------------------------<  93  4.0   |  22  |  0.7    Ca    9.3      14 Jul 2021 06:40  Mg     1.8     07-14    TPro  6.7  /  Alb  4.2  /  TBili  0.2  /  DBili  x   /  AST  13  /  ALT  21  /  AlkPhos  56  07-14    LIVER FUNCTIONS - ( 14 Jul 2021 06:40 )  Alb: 4.2 g/dL / Pro: 6.7 g/dL / ALK PHOS: 56 U/L / ALT: 21 U/L / AST: 13 U/L / GGT: x             Culture - Blood (collected 07-10-21 @ 14:10)  Source: .Blood Blood-Peripheral  Preliminary Report (07-11-21 @ 19:01):    No growth to date.    Culture - Blood (collected 07-10-21 @ 14:10)  Source: .Blood Blood-Peripheral  Preliminary Report (07-11-21 @ 19:01):    No growth to date.            RADIOLOGY & ADDITIONAL TESTS:  Personal review of radiological diagnostics performed  Echo and EKG results noted when applicable.     MEDICATIONS:  acetaminophen   Tablet .. 650 milliGRAM(s) Oral every 6 hours PRN  artificial tears (preservative free) Ophthalmic Solution 1 Drop(s) Both EYES three times a day PRN  benztropine 0.5 milliGRAM(s) Oral every 6 hours  diphenhydrAMINE 50 milliGRAM(s) Oral every 6 hours PRN  enoxaparin Injectable 40 milliGRAM(s) SubCutaneous daily  lamoTRIgine 75 milliGRAM(s) Oral two times a day  levETIRAcetam  Solution 500 milliGRAM(s) Oral two times a day  LORazepam     Tablet 1 milliGRAM(s) Oral three times a day  melatonin 5 milliGRAM(s) Oral at bedtime PRN  ondansetron Injectable 4 milliGRAM(s) IV Push every 4 hours PRN  piperacillin/tazobactam IVPB.. 3.375 Gram(s) IV Intermittent every 6 hours  polyethylene glycol 3350 17 Gram(s) Oral two times a day  senna 2 Tablet(s) Oral at bedtime      ANTIBIOTICS:  piperacillin/tazobactam IVPB.. 3.375 Gram(s) IV Intermittent every 6 hours

## 2021-07-14 NOTE — PROGRESS NOTE ADULT - ASSESSMENT
· Assessment	  30 y/o female with Pmhx MR non verbal at baseline, seizure disorder presented to the hospital for non-bilious non-bloody vomiting x 1 day. Patient is non verbal at baseline, she is from [facility] provided by group home staff Mr. Jim Simmons. Per group home staff patient was recently started on PO ativan for her seizure disorder. Yesterday after breakfast patient throw up x 2. After pt throw up pt started coughing so today she was brought to the hospital for further evaluation. No fever, chills, diarrhea, seizure like activity or weakness.    IMPRESSION;  #Aspiration with probable bacterial PNA RML with leucocytosis  7/10 CT A/P: Right middle lobe and scattered bilateral lower lobe opacities.  7/10 BCx NGTD  WBc 19>10.3  # CT : Approximate 4.8 cm x 4.1 x 6.7 cm solid complex right adnexal mass.   Pelvic sonogram could not be performed    RECOMMENDATIONS;  Zosyn 3.375 gm iv q6h for 7 days starting 7/11  recall prn please

## 2021-07-14 NOTE — SWALLOW BEDSIDE ASSESSMENT ADULT - SWALLOW EVAL: PROGNOSIS
pt recently upgraded to quarter-inch cut up consistency at ; Recommend dysphagia evaluation f/u upon d/c back to  pt recently upgraded to quarter-inch cut up consistency at ; Recommend dysphagia evaluation f/u upon d/c back to ; MIKEL Nichols from  made aware.

## 2021-07-14 NOTE — SWALLOW BEDSIDE ASSESSMENT ADULT - SLP GENERAL OBSERVATIONS
pt received in bed asleep arousable but w/ difficulty staying awake. +room air; GH aide at bedside
alert, on RA. requires 1:1 feed
pt received in bed awake eyes closed in no apparent pain. +pt swinging arms and legs in bed, required repositioning prior to PO trials.

## 2021-07-14 NOTE — SWALLOW BEDSIDE ASSESSMENT ADULT - ASR SWALLOW ASPIRATION MONITOR
oral hygiene/position upright (90Y)
oral hygiene/position upright (90Y)/cough/gurgly voice/throat clearing

## 2021-07-15 LAB
ALBUMIN SERPL ELPH-MCNC: 4.2 G/DL — SIGNIFICANT CHANGE UP (ref 3.5–5.2)
ALP SERPL-CCNC: 57 U/L — SIGNIFICANT CHANGE UP (ref 30–115)
ALT FLD-CCNC: 31 U/L — SIGNIFICANT CHANGE UP (ref 0–41)
ANION GAP SERPL CALC-SCNC: 13 MMOL/L — SIGNIFICANT CHANGE UP (ref 7–14)
AST SERPL-CCNC: 23 U/L — SIGNIFICANT CHANGE UP (ref 0–41)
BASOPHILS # BLD AUTO: 0.07 K/UL — SIGNIFICANT CHANGE UP (ref 0–0.2)
BASOPHILS NFR BLD AUTO: 1 % — SIGNIFICANT CHANGE UP (ref 0–1)
BILIRUB SERPL-MCNC: 0.3 MG/DL — SIGNIFICANT CHANGE UP (ref 0.2–1.2)
BUN SERPL-MCNC: 4 MG/DL — LOW (ref 10–20)
CALCIUM SERPL-MCNC: 9.9 MG/DL — SIGNIFICANT CHANGE UP (ref 8.5–10.1)
CHLORIDE SERPL-SCNC: 103 MMOL/L — SIGNIFICANT CHANGE UP (ref 98–110)
CO2 SERPL-SCNC: 22 MMOL/L — SIGNIFICANT CHANGE UP (ref 17–32)
CREAT SERPL-MCNC: 0.7 MG/DL — SIGNIFICANT CHANGE UP (ref 0.7–1.5)
CULTURE RESULTS: SIGNIFICANT CHANGE UP
CULTURE RESULTS: SIGNIFICANT CHANGE UP
EOSINOPHIL # BLD AUTO: 0.45 K/UL — SIGNIFICANT CHANGE UP (ref 0–0.7)
EOSINOPHIL NFR BLD AUTO: 6.7 % — SIGNIFICANT CHANGE UP (ref 0–8)
GLUCOSE SERPL-MCNC: 82 MG/DL — SIGNIFICANT CHANGE UP (ref 70–99)
GRAM STN FLD: SIGNIFICANT CHANGE UP
HCT VFR BLD CALC: 37.9 % — SIGNIFICANT CHANGE UP (ref 37–47)
HGB BLD-MCNC: 12 G/DL — SIGNIFICANT CHANGE UP (ref 12–16)
IMM GRANULOCYTES NFR BLD AUTO: 0.9 % — HIGH (ref 0.1–0.3)
LYMPHOCYTES # BLD AUTO: 2.16 K/UL — SIGNIFICANT CHANGE UP (ref 1.2–3.4)
LYMPHOCYTES # BLD AUTO: 32.1 % — SIGNIFICANT CHANGE UP (ref 20.5–51.1)
MAGNESIUM SERPL-MCNC: 1.8 MG/DL — SIGNIFICANT CHANGE UP (ref 1.8–2.4)
MCHC RBC-ENTMCNC: 26.8 PG — LOW (ref 27–31)
MCHC RBC-ENTMCNC: 31.7 G/DL — LOW (ref 32–37)
MCV RBC AUTO: 84.8 FL — SIGNIFICANT CHANGE UP (ref 81–99)
MONOCYTES # BLD AUTO: 0.88 K/UL — HIGH (ref 0.1–0.6)
MONOCYTES NFR BLD AUTO: 13.1 % — HIGH (ref 1.7–9.3)
NEUTROPHILS # BLD AUTO: 3.11 K/UL — SIGNIFICANT CHANGE UP (ref 1.4–6.5)
NEUTROPHILS NFR BLD AUTO: 46.2 % — SIGNIFICANT CHANGE UP (ref 42.2–75.2)
NRBC # BLD: 0 /100 WBCS — SIGNIFICANT CHANGE UP (ref 0–0)
PLATELET # BLD AUTO: 358 K/UL — SIGNIFICANT CHANGE UP (ref 130–400)
POTASSIUM SERPL-MCNC: 4.1 MMOL/L — SIGNIFICANT CHANGE UP (ref 3.5–5)
POTASSIUM SERPL-SCNC: 4.1 MMOL/L — SIGNIFICANT CHANGE UP (ref 3.5–5)
PROT SERPL-MCNC: 7.1 G/DL — SIGNIFICANT CHANGE UP (ref 6–8)
RBC # BLD: 4.47 M/UL — SIGNIFICANT CHANGE UP (ref 4.2–5.4)
RBC # FLD: 12.5 % — SIGNIFICANT CHANGE UP (ref 11.5–14.5)
SODIUM SERPL-SCNC: 138 MMOL/L — SIGNIFICANT CHANGE UP (ref 135–146)
SPECIMEN SOURCE: SIGNIFICANT CHANGE UP
SPECIMEN SOURCE: SIGNIFICANT CHANGE UP
WBC # BLD: 6.73 K/UL — SIGNIFICANT CHANGE UP (ref 4.8–10.8)
WBC # FLD AUTO: 6.73 K/UL — SIGNIFICANT CHANGE UP (ref 4.8–10.8)

## 2021-07-15 RX ORDER — CHLORHEXIDINE GLUCONATE 213 G/1000ML
1 SOLUTION TOPICAL
Refills: 0 | Status: DISCONTINUED | OUTPATIENT
Start: 2021-07-15 | End: 2021-07-19

## 2021-07-15 RX ADMIN — POLYETHYLENE GLYCOL 3350 17 GRAM(S): 17 POWDER, FOR SOLUTION ORAL at 17:13

## 2021-07-15 RX ADMIN — Medication 0.5 MILLIGRAM(S): at 23:34

## 2021-07-15 RX ADMIN — Medication 0.5 MILLIGRAM(S): at 11:03

## 2021-07-15 RX ADMIN — PIPERACILLIN AND TAZOBACTAM 200 GRAM(S): 4; .5 INJECTION, POWDER, LYOPHILIZED, FOR SOLUTION INTRAVENOUS at 05:13

## 2021-07-15 RX ADMIN — PIPERACILLIN AND TAZOBACTAM 200 GRAM(S): 4; .5 INJECTION, POWDER, LYOPHILIZED, FOR SOLUTION INTRAVENOUS at 17:12

## 2021-07-15 RX ADMIN — Medication 1 MILLIGRAM(S): at 21:17

## 2021-07-15 RX ADMIN — PIPERACILLIN AND TAZOBACTAM 200 GRAM(S): 4; .5 INJECTION, POWDER, LYOPHILIZED, FOR SOLUTION INTRAVENOUS at 11:04

## 2021-07-15 RX ADMIN — PIPERACILLIN AND TAZOBACTAM 200 GRAM(S): 4; .5 INJECTION, POWDER, LYOPHILIZED, FOR SOLUTION INTRAVENOUS at 00:14

## 2021-07-15 RX ADMIN — Medication 0.5 MILLIGRAM(S): at 17:10

## 2021-07-15 RX ADMIN — LEVETIRACETAM 500 MILLIGRAM(S): 250 TABLET, FILM COATED ORAL at 17:10

## 2021-07-15 RX ADMIN — Medication 1 MILLIGRAM(S): at 05:11

## 2021-07-15 RX ADMIN — Medication 1 MILLIGRAM(S): at 17:12

## 2021-07-15 RX ADMIN — ENOXAPARIN SODIUM 40 MILLIGRAM(S): 100 INJECTION SUBCUTANEOUS at 11:03

## 2021-07-15 RX ADMIN — LEVETIRACETAM 500 MILLIGRAM(S): 250 TABLET, FILM COATED ORAL at 05:12

## 2021-07-15 RX ADMIN — Medication 0.5 MILLIGRAM(S): at 05:13

## 2021-07-15 RX ADMIN — POLYETHYLENE GLYCOL 3350 17 GRAM(S): 17 POWDER, FOR SOLUTION ORAL at 05:13

## 2021-07-15 RX ADMIN — LAMOTRIGINE 75 MILLIGRAM(S): 25 TABLET, ORALLY DISINTEGRATING ORAL at 17:10

## 2021-07-15 RX ADMIN — Medication 0.5 MILLIGRAM(S): at 00:14

## 2021-07-15 RX ADMIN — SENNA PLUS 2 TABLET(S): 8.6 TABLET ORAL at 21:17

## 2021-07-15 RX ADMIN — LAMOTRIGINE 75 MILLIGRAM(S): 25 TABLET, ORALLY DISINTEGRATING ORAL at 05:12

## 2021-07-15 RX ADMIN — PIPERACILLIN AND TAZOBACTAM 200 GRAM(S): 4; .5 INJECTION, POWDER, LYOPHILIZED, FOR SOLUTION INTRAVENOUS at 23:34

## 2021-07-15 NOTE — PROGRESS NOTE ADULT - ASSESSMENT
Patient is a 30 y/o female with Pmhx MR non verbal at baseline, seizure disorder presented to the hospital for non-bilious non-bloody vomiting x 1 day. Patient is non verbal at baseline, she is from [facility] provided by group home staff Mr. Jim Simmons. Per group home staff patient was recently started on PO ativan for her seizure disorder. Yesterday after breakfast patient throw up x 2. After pt throw up pt started coughing so today she was brought to the hospital for further evaluation. No fever, chills, diarrhea, seizure like activity or weakness. however per group home aid patient is sleepier than usual    head CT negative. Patient was found have  aspiration pneumonia and CXR and CT. she also has a incidental finding of adnexal mass on CT scan.    #aspiration pneumonia  - found to have aspiration pneumonia on CXR and CT on admission  - NBNB vomiting x 2>>> currently vomiting resolved  - SIRS negative on admission. afebrile. WBC 12>19>10.3> 6.7  - blood culture 7/10/21: Gram variable rods  - speech and swallow eval- dysphagia 1 started   - zofran PRN for nausea  - s/p azithromycin and ceftriaxone in the ED. d/c ceftriaxone 1g IV q24hr & azithromycin 200mg q24hrs,   - c/w Zosyn 3.375 gm iv q6h as per ID x 7days since 7/11  - F/u ID    #r/o seizure disorder  -patient has a pmhx of seizure disorder, medication regime recently changed, started on ativan  -c/w home meds  -EEG negative for seizure disorder    # right adnexal mass 4.8x 4.1x 6.7cm  # Left adnexal mass 3.4cm  - incidental finding on ct  - pelvic ultrasound attempted- unable to perform due pt cognitive function  - can be Follow up as an out pt with ob gyn  -  wnl    activity: ambulate as tolerated  diet: dysphagia 1   dvt ppx: lvx  gi ppx: non indicated  dispo: c/w IV abx, f/u ID

## 2021-07-15 NOTE — PROGRESS NOTE ADULT - SUBJECTIVE AND OBJECTIVE BOX
JOSS HERNANDEZ  31y  Female  afebrile  comfortable  tolerates antibiotics    INTERVAL EVENTS:    T(C): 35.8 (07-15-21 @ 05:22), Max: 35.8 (07-15-21 @ 05:22)  HR: 54 (07-15-21 @ 05:22) (54 - 55)  BP: 142/64 (07-15-21 @ 05:22) (94/51 - 142/64)  RR: 18 (07-15-21 @ 05:22) (15 - 18)  SpO2: 96% (07-15-21 @ 08:15) (96% - 96%)  Wt(kg): --Vital Signs Last 24 Hrs  T(C): 35.8 (15 Jul 2021 05:22), Max: 35.8 (15 Jul 2021 05:22)  T(F): 96.5 (15 Jul 2021 05:22), Max: 96.5 (15 Jul 2021 05:22)  HR: 54 (15 Jul 2021 05:22) (54 - 55)  BP: 142/64 (15 Jul 2021 05:22) (94/51 - 142/64)  BP(mean): --  RR: 18 (15 Jul 2021 05:22) (15 - 18)  SpO2: 96% (15 Jul 2021 08:15) (96% - 96%)    PHYSICAL EXAM:  GENERAL: resting comfortably  NECK: Supple, No JVD, Normal thyroid  CHEST/LUNG: Clear; No crackles or wheezing  HEART: S1, S2, Regular rate and rhythm;   ABDOMEN: Soft, Nontender, Nondistended; Bowel sounds present  EXTREMITIES: No clubbing, cyanosis, or edema  SKIN: No rashes or lesions    LABS:                          12.0   6.73  )-----------( 358      ( 15 Jul 2021 06:55 )             37.9     07-15    138  |  103  |  4<L>  ----------------------------<  82  4.1   |  22  |  0.7    Ca    9.9      15 Jul 2021 06:55  Mg     1.8     07-15    TPro  7.1  /  Alb  4.2  /  TBili  0.3  /  DBili  x   /  AST  23  /  ALT  31  /  AlkPhos  57  07-15              acetaminophen   Tablet .. 650 milliGRAM(s) Oral every 6 hours PRN  artificial tears (preservative free) Ophthalmic Solution 1 Drop(s) Both EYES three times a day PRN  benztropine 0.5 milliGRAM(s) Oral every 6 hours  diphenhydrAMINE 50 milliGRAM(s) Oral every 6 hours PRN  enoxaparin Injectable 40 milliGRAM(s) SubCutaneous daily  lamoTRIgine 75 milliGRAM(s) Oral two times a day  levETIRAcetam  Solution 500 milliGRAM(s) Oral two times a day  LORazepam     Tablet 1 milliGRAM(s) Oral three times a day  melatonin 5 milliGRAM(s) Oral at bedtime PRN  ondansetron Injectable 4 milliGRAM(s) IV Push every 4 hours PRN  piperacillin/tazobactam IVPB.. 3.375 Gram(s) IV Intermittent every 6 hours  polyethylene glycol 3350 17 Gram(s) Oral two times a day  senna 2 Tablet(s) Oral at bedtime      RADIOLOGY & ADDITIONAL TESTS:    case discussed with the resident  Available consult notes reviewed    Assessment and plan:     labs ok  ID appreciated  eeg noted  cont iv abx till 7/18  d/c planning monday

## 2021-07-15 NOTE — PROGRESS NOTE ADULT - SUBJECTIVE AND OBJECTIVE BOX
SUBJECTIVE:    Patient is a 31y old Female who presents with a chief complaint of aspiration pneumonia (15 Jul 2021 10:56)    Currently admitted to medicine with the primary diagnosis of Pneumonia  Today is hospital day 5d. This morning she is resting comfortably in bed. No overnight events.     PAST MEDICAL & SURGICAL HISTORY  Mental retardation  profound    Seizure    ALLERGIES:  No Known Allergies    MEDICATIONS:  STANDING MEDICATIONS  benztropine 0.5 milliGRAM(s) Oral every 6 hours  chlorhexidine 4% Liquid 1 Application(s) Topical <User Schedule>  enoxaparin Injectable 40 milliGRAM(s) SubCutaneous daily  lamoTRIgine 75 milliGRAM(s) Oral two times a day  levETIRAcetam  Solution 500 milliGRAM(s) Oral two times a day  LORazepam     Tablet 1 milliGRAM(s) Oral three times a day  piperacillin/tazobactam IVPB.. 3.375 Gram(s) IV Intermittent every 6 hours  polyethylene glycol 3350 17 Gram(s) Oral two times a day  senna 2 Tablet(s) Oral at bedtime    PRN MEDICATIONS  acetaminophen   Tablet .. 650 milliGRAM(s) Oral every 6 hours PRN  artificial tears (preservative free) Ophthalmic Solution 1 Drop(s) Both EYES three times a day PRN  diphenhydrAMINE 50 milliGRAM(s) Oral every 6 hours PRN  melatonin 5 milliGRAM(s) Oral at bedtime PRN  ondansetron Injectable 4 milliGRAM(s) IV Push every 4 hours PRN    VITALS:   T(F): 96.5  HR: 54  BP: 142/64  RR: 18  SpO2: 96%    LABS:                        12.0   6.73  )-----------( 358      ( 15 Jul 2021 06:55 )             37.9     07-15    138  |  103  |  4<L>  ----------------------------<  82  4.1   |  22  |  0.7    Ca    9.9      15 Jul 2021 06:55  Mg     1.8     07-15    TPro  7.1  /  Alb  4.2  /  TBili  0.3  /  DBili  x   /  AST  23  /  ALT  31  /  AlkPhos  57  07-15  RADIOLOGY:    PHYSICAL EXAM:  GEN: No acute distress  LUNGS: Clear to auscultation bilaterally, no wheezes, rales, rhonchi  HEART: Regular rate and rhythm +s1 s2  ABD: Soft, non-tender, non-distended.   EXT: no LE edema/2+PP  NEURO: awake, non verbal, contracted UE and LE

## 2021-07-16 RX ORDER — SODIUM CHLORIDE 9 MG/ML
250 INJECTION, SOLUTION INTRAVENOUS ONCE
Refills: 0 | Status: COMPLETED | OUTPATIENT
Start: 2021-07-16 | End: 2021-07-16

## 2021-07-16 RX ADMIN — SODIUM CHLORIDE 250 MILLILITER(S): 9 INJECTION, SOLUTION INTRAVENOUS at 22:46

## 2021-07-16 RX ADMIN — PIPERACILLIN AND TAZOBACTAM 200 GRAM(S): 4; .5 INJECTION, POWDER, LYOPHILIZED, FOR SOLUTION INTRAVENOUS at 23:03

## 2021-07-16 RX ADMIN — Medication 0.5 MILLIGRAM(S): at 23:03

## 2021-07-16 RX ADMIN — SENNA PLUS 2 TABLET(S): 8.6 TABLET ORAL at 22:46

## 2021-07-16 RX ADMIN — Medication 0.5 MILLIGRAM(S): at 17:09

## 2021-07-16 RX ADMIN — PIPERACILLIN AND TAZOBACTAM 200 GRAM(S): 4; .5 INJECTION, POWDER, LYOPHILIZED, FOR SOLUTION INTRAVENOUS at 13:07

## 2021-07-16 RX ADMIN — Medication 0.5 MILLIGRAM(S): at 05:09

## 2021-07-16 RX ADMIN — Medication 1 MILLIGRAM(S): at 05:13

## 2021-07-16 RX ADMIN — Medication 1 MILLIGRAM(S): at 13:06

## 2021-07-16 RX ADMIN — PIPERACILLIN AND TAZOBACTAM 200 GRAM(S): 4; .5 INJECTION, POWDER, LYOPHILIZED, FOR SOLUTION INTRAVENOUS at 05:11

## 2021-07-16 RX ADMIN — LAMOTRIGINE 75 MILLIGRAM(S): 25 TABLET, ORALLY DISINTEGRATING ORAL at 05:09

## 2021-07-16 RX ADMIN — Medication 0.5 MILLIGRAM(S): at 11:21

## 2021-07-16 RX ADMIN — POLYETHYLENE GLYCOL 3350 17 GRAM(S): 17 POWDER, FOR SOLUTION ORAL at 05:09

## 2021-07-16 RX ADMIN — CHLORHEXIDINE GLUCONATE 1 APPLICATION(S): 213 SOLUTION TOPICAL at 05:08

## 2021-07-16 RX ADMIN — LEVETIRACETAM 500 MILLIGRAM(S): 250 TABLET, FILM COATED ORAL at 05:09

## 2021-07-16 RX ADMIN — Medication 1 MILLIGRAM(S): at 22:46

## 2021-07-16 RX ADMIN — POLYETHYLENE GLYCOL 3350 17 GRAM(S): 17 POWDER, FOR SOLUTION ORAL at 17:10

## 2021-07-16 RX ADMIN — LAMOTRIGINE 75 MILLIGRAM(S): 25 TABLET, ORALLY DISINTEGRATING ORAL at 17:10

## 2021-07-16 RX ADMIN — Medication 5 MILLIGRAM(S): at 23:03

## 2021-07-16 RX ADMIN — ENOXAPARIN SODIUM 40 MILLIGRAM(S): 100 INJECTION SUBCUTANEOUS at 11:21

## 2021-07-16 RX ADMIN — LEVETIRACETAM 500 MILLIGRAM(S): 250 TABLET, FILM COATED ORAL at 17:10

## 2021-07-16 RX ADMIN — PIPERACILLIN AND TAZOBACTAM 200 GRAM(S): 4; .5 INJECTION, POWDER, LYOPHILIZED, FOR SOLUTION INTRAVENOUS at 17:12

## 2021-07-16 NOTE — PROGRESS NOTE ADULT - SUBJECTIVE AND OBJECTIVE BOX
JOSS HERNANDEZ  31y  Female  comfortable, afebrile  tolerates antibiotics  no vomiting    INTERVAL EVENTS:    T(C): 35.6 (07-16-21 @ 05:17), Max: 36.1 (07-15-21 @ 19:15)  HR: 56 (07-16-21 @ 05:17) (56 - 63)  BP: 119/82 (07-16-21 @ 05:17) (89/54 - 119/82)  RR: 18 (07-16-21 @ 05:17) (18 - 18)  SpO2: --  Wt(kg): --Vital Signs Last 24 Hrs  T(C): 35.6 (16 Jul 2021 05:17), Max: 36.1 (15 Jul 2021 19:15)  T(F): 96.1 (16 Jul 2021 05:17), Max: 96.9 (15 Jul 2021 19:15)  HR: 56 (16 Jul 2021 05:17) (56 - 63)  BP: 119/82 (16 Jul 2021 05:17) (89/54 - 119/82)  BP(mean): --  RR: 18 (16 Jul 2021 05:17) (18 - 18)  SpO2: --    PHYSICAL EXAM:  GENERAL: resting comfortably  NECK: Supple, No JVD, Normal thyroid  CHEST/LUNG: Clear; No crackles or wheezing  HEART: S1, S2, Regular rate and rhythm;   ABDOMEN: Soft, Nontender, Nondistended; Bowel sounds present  EXTREMITIES: No clubbing, cyanosis, or edema  SKIN: No rashes or lesions    LABS:                          12.0   6.73  )-----------( 358      ( 15 Jul 2021 06:55 )             37.9     07-15    138  |  103  |  4<L>  ----------------------------<  82  4.1   |  22  |  0.7    Ca    9.9      15 Jul 2021 06:55  Mg     1.8     07-15    TPro  7.1  /  Alb  4.2  /  TBili  0.3  /  DBili  x   /  AST  23  /  ALT  31  /  AlkPhos  57  07-15              acetaminophen   Tablet .. 650 milliGRAM(s) Oral every 6 hours PRN  artificial tears (preservative free) Ophthalmic Solution 1 Drop(s) Both EYES three times a day PRN  benztropine 0.5 milliGRAM(s) Oral every 6 hours  chlorhexidine 4% Liquid 1 Application(s) Topical <User Schedule>  diphenhydrAMINE 50 milliGRAM(s) Oral every 6 hours PRN  enoxaparin Injectable 40 milliGRAM(s) SubCutaneous daily  lamoTRIgine 75 milliGRAM(s) Oral two times a day  levETIRAcetam  Solution 500 milliGRAM(s) Oral two times a day  LORazepam     Tablet 1 milliGRAM(s) Oral three times a day  melatonin 5 milliGRAM(s) Oral at bedtime PRN  ondansetron Injectable 4 milliGRAM(s) IV Push every 4 hours PRN  piperacillin/tazobactam IVPB.. 3.375 Gram(s) IV Intermittent every 6 hours  polyethylene glycol 3350 17 Gram(s) Oral two times a day  senna 2 Tablet(s) Oral at bedtime      RADIOLOGY & ADDITIONAL TESTS:    case discussed with the resident  Available consult notes reviewed    Assessment and plan:       complete antibiotics  d/c plan monday

## 2021-07-16 NOTE — PROGRESS NOTE ADULT - ASSESSMENT
Patient is a 30 y/o female with Pmhx MR non verbal at baseline, seizure disorder presented to the hospital for non-bilious non-bloody vomiting x 1 day. Patient is non verbal at baseline, she is from [facility] provided by group home staff Mr. Jim Simmons. Per group home staff patient was recently started on PO ativan for her seizure disorder. Yesterday after breakfast patient throw up x 2. After pt throw up pt started coughing so today she was brought to the hospital for further evaluation. No fever, chills, diarrhea, seizure like activity or weakness. however per group home aid patient is sleepier than usual    head CT negative. Patient was found have  aspiration pneumonia and CXR and CT. she also has a incidental finding of adnexal mass on CT scan.    #aspiration pneumonia  - found to have aspiration pneumonia on CXR and CT on admission  - NBNB vomiting x 2>>> currently vomiting resolved  - SIRS negative on admission. afebrile. WBC 12>19>10.3> 6.7  - blood culture 7/10/21: Actinomyces> not necessary to treat as per ID  - speech and swallow eval- dysphagia 1  - zofran PRN for nausea  - s/p azithromycin and ceftriaxone in the ED. d/c ceftriaxone 1g IV q24hr & azithromycin 200mg q24hrs,   - c/w Zosyn 3.375 gm iv q6h as per ID x 7days since 7/11    #r/o seizure disorder  -patient has a pmhx of seizure disorder, medication regime recently changed, started on ativan  -c/w home meds  -EEG negative for seizure disorder    # right adnexal mass 4.8x 4.1x 6.7cm  # Left adnexal mass 3.4cm  - incidental finding on ct  - pelvic ultrasound attempted- unable to perform due pt cognitive function  - can be Follow up as an out pt with ob gyn  -  wnl    activity: ambulate as tolerated  diet: dysphagia 1   dvt ppx: lvx  gi ppx: non indicated  dispo: c/w IV abx, discharge plan on Monday

## 2021-07-16 NOTE — PROGRESS NOTE ADULT - SUBJECTIVE AND OBJECTIVE BOX
SUBJECTIVE:    Patient is a 31y old Female who presents with a chief complaint of aspiration pneumonia (16 Jul 2021 09:28)    Currently admitted to medicine with the primary diagnosis of Pneumonia.  Today is hospital day 6d. This morning she is resting comfortably in bed. No overnight events.     PAST MEDICAL & SURGICAL HISTORY  Mental retardation  profound    Seizure  ALLERGIES:  No Known Allergies    MEDICATIONS:  STANDING MEDICATIONS  benztropine 0.5 milliGRAM(s) Oral every 6 hours  chlorhexidine 4% Liquid 1 Application(s) Topical <User Schedule>  enoxaparin Injectable 40 milliGRAM(s) SubCutaneous daily  lamoTRIgine 75 milliGRAM(s) Oral two times a day  levETIRAcetam  Solution 500 milliGRAM(s) Oral two times a day  LORazepam     Tablet 1 milliGRAM(s) Oral three times a day  piperacillin/tazobactam IVPB.. 3.375 Gram(s) IV Intermittent every 6 hours  polyethylene glycol 3350 17 Gram(s) Oral two times a day  senna 2 Tablet(s) Oral at bedtime    PRN MEDICATIONS  acetaminophen   Tablet .. 650 milliGRAM(s) Oral every 6 hours PRN  artificial tears (preservative free) Ophthalmic Solution 1 Drop(s) Both EYES three times a day PRN  diphenhydrAMINE 50 milliGRAM(s) Oral every 6 hours PRN  melatonin 5 milliGRAM(s) Oral at bedtime PRN  ondansetron Injectable 4 milliGRAM(s) IV Push every 4 hours PRN    VITALS:   T(F): 96.8  HR: 78  BP: 107/52  RR: 18  SpO2: --    LABS:                        12.0   6.73  )-----------( 358      ( 15 Jul 2021 06:55 )             37.9     07-15    138  |  103  |  4<L>  ----------------------------<  82  4.1   |  22  |  0.7    Ca    9.9      15 Jul 2021 06:55  Mg     1.8     07-15    TPro  7.1  /  Alb  4.2  /  TBili  0.3  /  DBili  x   /  AST  23  /  ALT  31  /  AlkPhos  57  07-15      RADIOLOGY:    PHYSICAL EXAM:  GEN: No acute distress  LUNGS: Clear to auscultation bilaterally, no wheezes, rales, rhonchi  HEART: Regular rate and rhythm +s1 s2  ABD: Soft, non-tender, non-distended.   EXT: no LE edema/2+PP  NEURO: awake, non verbal, contracted UE and LE

## 2021-07-17 LAB
ANION GAP SERPL CALC-SCNC: 12 MMOL/L — SIGNIFICANT CHANGE UP (ref 7–14)
BUN SERPL-MCNC: 4 MG/DL — LOW (ref 10–20)
CALCIUM SERPL-MCNC: 9.3 MG/DL — SIGNIFICANT CHANGE UP (ref 8.5–10.1)
CHLORIDE SERPL-SCNC: 107 MMOL/L — SIGNIFICANT CHANGE UP (ref 98–110)
CO2 SERPL-SCNC: 20 MMOL/L — SIGNIFICANT CHANGE UP (ref 17–32)
CREAT SERPL-MCNC: 0.6 MG/DL — LOW (ref 0.7–1.5)
GLUCOSE SERPL-MCNC: 65 MG/DL — LOW (ref 70–99)
HCT VFR BLD CALC: 34.8 % — LOW (ref 37–47)
HGB BLD-MCNC: 10.9 G/DL — LOW (ref 12–16)
MAGNESIUM SERPL-MCNC: 1.8 MG/DL — SIGNIFICANT CHANGE UP (ref 1.8–2.4)
MCHC RBC-ENTMCNC: 26.8 PG — LOW (ref 27–31)
MCHC RBC-ENTMCNC: 31.3 G/DL — LOW (ref 32–37)
MCV RBC AUTO: 85.5 FL — SIGNIFICANT CHANGE UP (ref 81–99)
NRBC # BLD: 0 /100 WBCS — SIGNIFICANT CHANGE UP (ref 0–0)
PLATELET # BLD AUTO: 273 K/UL — SIGNIFICANT CHANGE UP (ref 130–400)
POTASSIUM SERPL-MCNC: 4.3 MMOL/L — SIGNIFICANT CHANGE UP (ref 3.5–5)
POTASSIUM SERPL-SCNC: 4.3 MMOL/L — SIGNIFICANT CHANGE UP (ref 3.5–5)
RBC # BLD: 4.07 M/UL — LOW (ref 4.2–5.4)
RBC # FLD: 12.8 % — SIGNIFICANT CHANGE UP (ref 11.5–14.5)
SODIUM SERPL-SCNC: 139 MMOL/L — SIGNIFICANT CHANGE UP (ref 135–146)
WBC # BLD: 9.46 K/UL — SIGNIFICANT CHANGE UP (ref 4.8–10.8)
WBC # FLD AUTO: 9.46 K/UL — SIGNIFICANT CHANGE UP (ref 4.8–10.8)

## 2021-07-17 RX ORDER — SODIUM CHLORIDE 9 MG/ML
500 INJECTION, SOLUTION INTRAVENOUS ONCE
Refills: 0 | Status: COMPLETED | OUTPATIENT
Start: 2021-07-17 | End: 2021-07-17

## 2021-07-17 RX ADMIN — LEVETIRACETAM 500 MILLIGRAM(S): 250 TABLET, FILM COATED ORAL at 17:14

## 2021-07-17 RX ADMIN — Medication 0.5 MILLIGRAM(S): at 06:10

## 2021-07-17 RX ADMIN — ENOXAPARIN SODIUM 40 MILLIGRAM(S): 100 INJECTION SUBCUTANEOUS at 11:55

## 2021-07-17 RX ADMIN — POLYETHYLENE GLYCOL 3350 17 GRAM(S): 17 POWDER, FOR SOLUTION ORAL at 06:11

## 2021-07-17 RX ADMIN — LAMOTRIGINE 75 MILLIGRAM(S): 25 TABLET, ORALLY DISINTEGRATING ORAL at 06:50

## 2021-07-17 RX ADMIN — Medication 0.5 MILLIGRAM(S): at 17:13

## 2021-07-17 RX ADMIN — PIPERACILLIN AND TAZOBACTAM 200 GRAM(S): 4; .5 INJECTION, POWDER, LYOPHILIZED, FOR SOLUTION INTRAVENOUS at 11:56

## 2021-07-17 RX ADMIN — LEVETIRACETAM 500 MILLIGRAM(S): 250 TABLET, FILM COATED ORAL at 06:10

## 2021-07-17 RX ADMIN — PIPERACILLIN AND TAZOBACTAM 200 GRAM(S): 4; .5 INJECTION, POWDER, LYOPHILIZED, FOR SOLUTION INTRAVENOUS at 23:06

## 2021-07-17 RX ADMIN — Medication 1 MILLIGRAM(S): at 06:09

## 2021-07-17 RX ADMIN — POLYETHYLENE GLYCOL 3350 17 GRAM(S): 17 POWDER, FOR SOLUTION ORAL at 17:14

## 2021-07-17 RX ADMIN — CHLORHEXIDINE GLUCONATE 1 APPLICATION(S): 213 SOLUTION TOPICAL at 06:11

## 2021-07-17 RX ADMIN — Medication 1 MILLIGRAM(S): at 13:23

## 2021-07-17 RX ADMIN — SENNA PLUS 2 TABLET(S): 8.6 TABLET ORAL at 22:09

## 2021-07-17 RX ADMIN — Medication 1 MILLIGRAM(S): at 22:08

## 2021-07-17 RX ADMIN — SODIUM CHLORIDE 2000 MILLILITER(S): 9 INJECTION, SOLUTION INTRAVENOUS at 08:30

## 2021-07-17 RX ADMIN — PIPERACILLIN AND TAZOBACTAM 200 GRAM(S): 4; .5 INJECTION, POWDER, LYOPHILIZED, FOR SOLUTION INTRAVENOUS at 06:09

## 2021-07-17 RX ADMIN — Medication 0.5 MILLIGRAM(S): at 11:54

## 2021-07-17 RX ADMIN — PIPERACILLIN AND TAZOBACTAM 200 GRAM(S): 4; .5 INJECTION, POWDER, LYOPHILIZED, FOR SOLUTION INTRAVENOUS at 17:15

## 2021-07-17 RX ADMIN — LAMOTRIGINE 75 MILLIGRAM(S): 25 TABLET, ORALLY DISINTEGRATING ORAL at 17:13

## 2021-07-17 RX ADMIN — Medication 0.5 MILLIGRAM(S): at 23:06

## 2021-07-17 NOTE — PROGRESS NOTE ADULT - ASSESSMENT
ASSESSMENT & PLAN    Patient is a 30 y/o female with Pmhx mental retardation (non verbal at baseline), seizure disorder presented to the hospital from [facility] for non-bilious non-bloody vomiting x 1 day, with coughing fit thereafter, found to have aspiration pneumonia on CXR and CT, doing well, on zosyn    #aspiration pneumonia: found to have aspiration pneumonia on CXR and CT on admission  - c/w Zosyn 3.375 gm iv q6h as per ID x 7days since 7/11, will stop tomorrow  - n/v resolved  - SIRS negative on admission. afebrile. WBC 12>19>10.3> 6.7  - blood culture 7/10/21: Actinomyces> not necessary to treat as per ID  - zofran 4mg IV PRN for nausea  - s/p azithromycin and ceftriaxone in the ED. d/c ceftriaxone 1g IV q24hr & azithromycin 200mg q24hrs    #r/o seizure disorder  -patient has a pmhx of seizure disorder, medication regime recently changed, started on ativan  -c/w home med Keppra 500mg BID, lamotrigine 75mg BID, lorazepam 1mg TID  -melatonin for insomnia  -EEG negative for seizure disorder    # right adnexal mass 4.8x 4.1x 6.7cm  # Left adnexal mass 3.4cm  - incidental finding on ct  - pelvic ultrasound attempted- unable to perform due pt cognitive function  - can be Follow up as an out pt with ob gyn  -  wnl    activity: ambulate as tolerated  diet: dysphagia 1   dvt ppx: lvx  gi ppx: non indicated  dispo: c/w IV abx, discharge plan on Monday pending patient afebrile   ASSESSMENT & PLAN    Patient is a 30 y/o female with Pmhx mental retardation (non verbal at baseline), seizure disorder presented to the hospital from [facility] for non-bilious non-bloody vomiting x 1 day, with coughing fit thereafter, found to have aspiration pneumonia on CXR and CT, on zosyn, with hypotensive episodes    #aspiration pneumonia: found to have aspiration pneumonia on CXR and CT on admission  - c/w Zosyn 3.375 gm iv q6h as per ID x 7days since 7/11, will stop tomorrow  - n/v resolved  - SIRS negative on admission. afebrile. WBC 12>19>10.3> 6.7  - blood culture 7/10/21: Actinomyces> not necessary to treat as per ID  - zofran 4mg IV PRN for nausea  - s/p azithromycin and ceftriaxone in the ED. d/c ceftriaxone 1g IV q24hr & azithromycin 200mg q24hrs    #Hypotension  -500 bolus LR now  -reassess BP laying flat    #r/o seizure disorder  -patient has a pmhx of seizure disorder, medication regime recently changed, started on ativan  -c/w home med Keppra 500mg BID, lamotrigine 75mg BID, lorazepam 1mg TID  -melatonin for insomnia  -EEG negative for seizure disorder    # right adnexal mass 4.8x 4.1x 6.7cm  # Left adnexal mass 3.4cm  - incidental finding on ct  - pelvic ultrasound attempted- unable to perform due pt cognitive function  - can be Follow up as an out pt with ob gyn  -  wnl    activity: ambulate as tolerated  diet: dysphagia 1   dvt ppx: lvx  gi ppx: non indicated  dispo: c/w IV abx, discharge plan on Monday pending patient afebrile

## 2021-07-17 NOTE — PROGRESS NOTE ADULT - SUBJECTIVE AND OBJECTIVE BOX
JOSS HERNANDEZ 31y Female  MRN#: [id number]   CODE STATUS: full    Hospital Day: 7d    Pt is currently admitted with the primary diagnosis of pneumonia, adnexal mass    SUBJECTIVE  Hospital Course    Overnight events: No acute events overnight. Patient was given 250 LR bolus for low BP 85/43. Otherwise patient has been resting well.     Subjective complaints     Present Today:   - Adkins:  No [ X ], Yes [   ] : Indication:     - Type of IV Access:       .. CVC/Piccline:  No [X  ], Yes [   ] : Indication:       .. Midline: No [  X], Yes [   ] : Indication:                                             ----------------------------------------------------------  OBJECTIVE  PAST MEDICAL & SURGICAL HISTORY  Mental retardation  profound    Seizure                                              -----------------------------------------------------------  ALLERGIES:  No Known Allergies                                            ------------------------------------------------------------    HOME MEDICATIONS  Home Medications:  Ativan 1 mg oral tablet: 1 tab(s) orally 3 times a day (10 Jul 2021 19:09)  benztropine 0.5 mg oral tablet: 1 tab(s) orally 4 times a day (10 Jul 2021 19:08)  diphenhydrAMINE 50 mg oral tablet: 1 tab(s) orally 4 times a day (10 Jul 2021 19:09)  Keppra 500 mg oral tablet: 1 tab(s) orally 2 times a day (10 Jul 2021 19:08)  lamoTRIgine 25 mg oral tablet, disintegrating: 3 tab(s) orally 2 times a day (10 Jul 2021 19:08)                           MEDICATIONS:  STANDING MEDICATIONS  benztropine 0.5 milliGRAM(s) Oral every 6 hours  chlorhexidine 4% Liquid 1 Application(s) Topical <User Schedule>  enoxaparin Injectable 40 milliGRAM(s) SubCutaneous daily  lamoTRIgine 75 milliGRAM(s) Oral two times a day  levETIRAcetam  Solution 500 milliGRAM(s) Oral two times a day  LORazepam     Tablet 1 milliGRAM(s) Oral three times a day  piperacillin/tazobactam IVPB.. 3.375 Gram(s) IV Intermittent every 6 hours  polyethylene glycol 3350 17 Gram(s) Oral two times a day  senna 2 Tablet(s) Oral at bedtime    PRN MEDICATIONS  acetaminophen   Tablet .. 650 milliGRAM(s) Oral every 6 hours PRN  artificial tears (preservative free) Ophthalmic Solution 1 Drop(s) Both EYES three times a day PRN  diphenhydrAMINE 50 milliGRAM(s) Oral every 6 hours PRN  melatonin 5 milliGRAM(s) Oral at bedtime PRN  ondansetron Injectable 4 milliGRAM(s) IV Push every 4 hours PRN                                            ------------------------------------------------------------  VITAL SIGNS: Last 24 Hours  T(C): 36.5 (17 Jul 2021 05:08), Max: 36.5 (17 Jul 2021 05:08)  T(F): 97.7 (17 Jul 2021 05:08), Max: 97.7 (17 Jul 2021 05:08)  HR: 57 (17 Jul 2021 05:08) (57 - 78)  BP: 89/54 (17 Jul 2021 05:08) (85/43 - 107/52)  BP(mean): --  RR: 18 (17 Jul 2021 05:08) (18 - 18)  SpO2: --      07-16-21 @ 07:01  -  07-17-21 @ 07:00  --------------------------------------------------------  IN: 0 mL / OUT: 2 mL / NET: -2 mL                                             --------------------------------------------------------------  LABS:                                                                    -------------------------------------------------------------  RADIOLOGY:                                            --------------------------------------------------------------    PHYSICAL EXAM:  General: Well appearing, eating breakfast comfortably, in no acute distress, moving both arms in circular motion  LUNGS: CTAB, no wheeze  HEART: RRR, no murmur  ABDOMEN: NBS, soft abdomen, nontender to palpation  EXT: pitting edema LE b/l, 3+ up to knee                                           --------------------------------------------------------------

## 2021-07-17 NOTE — PROGRESS NOTE ADULT - SUBJECTIVE AND OBJECTIVE BOX
JOSS HERNANDEZ  31y  Female  afebrile comfortable  tolerates po intake  no diarrhea      INTERVAL EVENTS:    T(C): 35.7 (07-17-21 @ 13:24), Max: 36.5 (07-17-21 @ 05:08)  HR: 61 (07-17-21 @ 13:24) (57 - 69)  BP: 100/57 (07-17-21 @ 13:24) (85/43 - 100/57)  RR: 16 (07-17-21 @ 13:24) (16 - 18)  SpO2: --  Wt(kg): --Vital Signs Last 24 Hrs  T(C): 35.7 (17 Jul 2021 13:24), Max: 36.5 (17 Jul 2021 05:08)  T(F): 96.3 (17 Jul 2021 13:24), Max: 97.7 (17 Jul 2021 05:08)  HR: 61 (17 Jul 2021 13:24) (57 - 69)  BP: 100/57 (17 Jul 2021 13:24) (85/43 - 100/57)  BP(mean): --  RR: 16 (17 Jul 2021 13:24) (16 - 18)  SpO2: --    PHYSICAL EXAM:  GENERAL: resting comfortably  NECK: Supple, No JVD, Normal thyroid  CHEST/LUNG: Clear; No crackles or wheezing  HEART: S1, S2, Regular rate and rhythm;   ABDOMEN: Soft, Nontender, Nondistended; Bowel sounds present  EXTREMITIES: No clubbing, cyanosis, or edema  SKIN: No rashes or lesions    LABS:                          10.9   9.46  )-----------( 273      ( 17 Jul 2021 11:00 )             34.8     07-17    139  |  107  |  4<L>  ----------------------------<  65<L>  4.3   |  20  |  0.6<L>    Ca    9.3      17 Jul 2021 11:00  Mg     1.8     07-17                acetaminophen   Tablet .. 650 milliGRAM(s) Oral every 6 hours PRN  artificial tears (preservative free) Ophthalmic Solution 1 Drop(s) Both EYES three times a day PRN  benztropine 0.5 milliGRAM(s) Oral every 6 hours  chlorhexidine 4% Liquid 1 Application(s) Topical <User Schedule>  diphenhydrAMINE 50 milliGRAM(s) Oral every 6 hours PRN  enoxaparin Injectable 40 milliGRAM(s) SubCutaneous daily  lamoTRIgine 75 milliGRAM(s) Oral two times a day  levETIRAcetam  Solution 500 milliGRAM(s) Oral two times a day  LORazepam     Tablet 1 milliGRAM(s) Oral three times a day  melatonin 5 milliGRAM(s) Oral at bedtime PRN  ondansetron Injectable 4 milliGRAM(s) IV Push every 4 hours PRN  piperacillin/tazobactam IVPB.. 3.375 Gram(s) IV Intermittent every 6 hours  polyethylene glycol 3350 17 Gram(s) Oral two times a day  senna 2 Tablet(s) Oral at bedtime      RADIOLOGY & ADDITIONAL TESTS:    case discussed with the resident  Available consult notes reviewed    Assessment and plan:     will complete antibiotics and anticipate d/c to group home monday

## 2021-07-18 LAB
ALBUMIN SERPL ELPH-MCNC: 4.2 G/DL — SIGNIFICANT CHANGE UP (ref 3.5–5.2)
ALP SERPL-CCNC: 56 U/L — SIGNIFICANT CHANGE UP (ref 30–115)
ALT FLD-CCNC: 50 U/L — HIGH (ref 0–41)
ANION GAP SERPL CALC-SCNC: 11 MMOL/L — SIGNIFICANT CHANGE UP (ref 7–14)
AST SERPL-CCNC: 23 U/L — SIGNIFICANT CHANGE UP (ref 0–41)
BASOPHILS # BLD AUTO: 0.13 K/UL — SIGNIFICANT CHANGE UP (ref 0–0.2)
BASOPHILS NFR BLD AUTO: 1.4 % — HIGH (ref 0–1)
BILIRUB SERPL-MCNC: 0.2 MG/DL — SIGNIFICANT CHANGE UP (ref 0.2–1.2)
BUN SERPL-MCNC: 4 MG/DL — LOW (ref 10–20)
CALCIUM SERPL-MCNC: 9.6 MG/DL — SIGNIFICANT CHANGE UP (ref 8.5–10.1)
CHLORIDE SERPL-SCNC: 104 MMOL/L — SIGNIFICANT CHANGE UP (ref 98–110)
CO2 SERPL-SCNC: 23 MMOL/L — SIGNIFICANT CHANGE UP (ref 17–32)
CREAT SERPL-MCNC: 0.7 MG/DL — SIGNIFICANT CHANGE UP (ref 0.7–1.5)
EOSINOPHIL # BLD AUTO: 0.55 K/UL — SIGNIFICANT CHANGE UP (ref 0–0.7)
EOSINOPHIL NFR BLD AUTO: 5.8 % — SIGNIFICANT CHANGE UP (ref 0–8)
GLUCOSE SERPL-MCNC: 62 MG/DL — LOW (ref 70–99)
HCT VFR BLD CALC: 36.7 % — LOW (ref 37–47)
HGB BLD-MCNC: 11.6 G/DL — LOW (ref 12–16)
IMM GRANULOCYTES NFR BLD AUTO: 0.8 % — HIGH (ref 0.1–0.3)
LYMPHOCYTES # BLD AUTO: 2.92 K/UL — SIGNIFICANT CHANGE UP (ref 1.2–3.4)
LYMPHOCYTES # BLD AUTO: 30.7 % — SIGNIFICANT CHANGE UP (ref 20.5–51.1)
MAGNESIUM SERPL-MCNC: 1.7 MG/DL — LOW (ref 1.8–2.4)
MCHC RBC-ENTMCNC: 27.5 PG — SIGNIFICANT CHANGE UP (ref 27–31)
MCHC RBC-ENTMCNC: 31.6 G/DL — LOW (ref 32–37)
MCV RBC AUTO: 87 FL — SIGNIFICANT CHANGE UP (ref 81–99)
MONOCYTES # BLD AUTO: 1.47 K/UL — HIGH (ref 0.1–0.6)
MONOCYTES NFR BLD AUTO: 15.5 % — HIGH (ref 1.7–9.3)
NEUTROPHILS # BLD AUTO: 4.35 K/UL — SIGNIFICANT CHANGE UP (ref 1.4–6.5)
NEUTROPHILS NFR BLD AUTO: 45.8 % — SIGNIFICANT CHANGE UP (ref 42.2–75.2)
NRBC # BLD: 0 /100 WBCS — SIGNIFICANT CHANGE UP (ref 0–0)
PLATELET # BLD AUTO: 357 K/UL — SIGNIFICANT CHANGE UP (ref 130–400)
POTASSIUM SERPL-MCNC: 3.5 MMOL/L — SIGNIFICANT CHANGE UP (ref 3.5–5)
POTASSIUM SERPL-SCNC: 3.5 MMOL/L — SIGNIFICANT CHANGE UP (ref 3.5–5)
PROT SERPL-MCNC: 7 G/DL — SIGNIFICANT CHANGE UP (ref 6–8)
RBC # BLD: 4.22 M/UL — SIGNIFICANT CHANGE UP (ref 4.2–5.4)
RBC # FLD: 12.8 % — SIGNIFICANT CHANGE UP (ref 11.5–14.5)
SODIUM SERPL-SCNC: 138 MMOL/L — SIGNIFICANT CHANGE UP (ref 135–146)
WBC # BLD: 9.5 K/UL — SIGNIFICANT CHANGE UP (ref 4.8–10.8)
WBC # FLD AUTO: 9.5 K/UL — SIGNIFICANT CHANGE UP (ref 4.8–10.8)

## 2021-07-18 RX ADMIN — Medication 0.5 MILLIGRAM(S): at 05:59

## 2021-07-18 RX ADMIN — LAMOTRIGINE 75 MILLIGRAM(S): 25 TABLET, ORALLY DISINTEGRATING ORAL at 17:43

## 2021-07-18 RX ADMIN — SENNA PLUS 2 TABLET(S): 8.6 TABLET ORAL at 21:37

## 2021-07-18 RX ADMIN — CHLORHEXIDINE GLUCONATE 1 APPLICATION(S): 213 SOLUTION TOPICAL at 05:59

## 2021-07-18 RX ADMIN — POLYETHYLENE GLYCOL 3350 17 GRAM(S): 17 POWDER, FOR SOLUTION ORAL at 05:59

## 2021-07-18 RX ADMIN — Medication 0.5 MILLIGRAM(S): at 11:38

## 2021-07-18 RX ADMIN — LEVETIRACETAM 500 MILLIGRAM(S): 250 TABLET, FILM COATED ORAL at 17:44

## 2021-07-18 RX ADMIN — PIPERACILLIN AND TAZOBACTAM 200 GRAM(S): 4; .5 INJECTION, POWDER, LYOPHILIZED, FOR SOLUTION INTRAVENOUS at 11:39

## 2021-07-18 RX ADMIN — Medication 0.5 MILLIGRAM(S): at 17:43

## 2021-07-18 RX ADMIN — ENOXAPARIN SODIUM 40 MILLIGRAM(S): 100 INJECTION SUBCUTANEOUS at 11:38

## 2021-07-18 RX ADMIN — Medication 50 MILLIGRAM(S): at 02:00

## 2021-07-18 RX ADMIN — LAMOTRIGINE 75 MILLIGRAM(S): 25 TABLET, ORALLY DISINTEGRATING ORAL at 05:59

## 2021-07-18 RX ADMIN — POLYETHYLENE GLYCOL 3350 17 GRAM(S): 17 POWDER, FOR SOLUTION ORAL at 17:44

## 2021-07-18 RX ADMIN — LEVETIRACETAM 500 MILLIGRAM(S): 250 TABLET, FILM COATED ORAL at 05:59

## 2021-07-18 RX ADMIN — PIPERACILLIN AND TAZOBACTAM 200 GRAM(S): 4; .5 INJECTION, POWDER, LYOPHILIZED, FOR SOLUTION INTRAVENOUS at 05:58

## 2021-07-18 RX ADMIN — Medication 0.5 MILLIGRAM(S): at 23:57

## 2021-07-18 NOTE — PROGRESS NOTE ADULT - SUBJECTIVE AND OBJECTIVE BOX
JOSS HERNANDEZ  31y  Female    clinically stable  completed antibiotics  pulled out iv- doesnt need to reinsert  INTERVAL EVENTS:    T(C): 36.1 (07-18-21 @ 12:30), Max: 36.1 (07-18-21 @ 12:30)  HR: 60 (07-18-21 @ 12:30) (58 - 71)  BP: 105/53 (07-18-21 @ 12:30) (87/49 - 105/53)  RR: 18 (07-18-21 @ 12:30) (18 - 18)  SpO2: --  Wt(kg): --Vital Signs Last 24 Hrs  T(C): 36.1 (18 Jul 2021 12:30), Max: 36.1 (18 Jul 2021 12:30)  T(F): 96.9 (18 Jul 2021 12:30), Max: 96.9 (18 Jul 2021 12:30)  HR: 60 (18 Jul 2021 12:30) (58 - 71)  BP: 105/53 (18 Jul 2021 12:30) (87/49 - 105/53)  BP(mean): --  RR: 18 (18 Jul 2021 12:30) (18 - 18)  SpO2: --    PHYSICAL EXAM:  GENERAL: resting comfortably  NECK: Supple, No JVD, Normal thyroid  CHEST/LUNG: Clear; No crackles or wheezing  HEART: S1, S2, Regular rate and rhythm;   ABDOMEN: Soft, Nontender, Nondistended; Bowel sounds present  EXTREMITIES: No clubbing, cyanosis, or edema  SKIN: No rashes or lesions    cLABS:                          11.6   9.50  )-----------( 357      ( 18 Jul 2021 08:26 )             36.7     07-18    138  |  104  |  4<L>  ----------------------------<  62<L>  3.5   |  23  |  0.7    Ca    9.6      18 Jul 2021 08:26  Mg     1.7     07-18    TPro  7.0  /  Alb  4.2  /  TBili  0.2  /  DBili  x   /  AST  23  /  ALT  50<H>  /  AlkPhos  56  07-18              acetaminophen   Tablet .. 650 milliGRAM(s) Oral every 6 hours PRN  artificial tears (preservative free) Ophthalmic Solution 1 Drop(s) Both EYES three times a day PRN  benztropine 0.5 milliGRAM(s) Oral every 6 hours  chlorhexidine 4% Liquid 1 Application(s) Topical <User Schedule>  diphenhydrAMINE 50 milliGRAM(s) Oral every 6 hours PRN  enoxaparin Injectable 40 milliGRAM(s) SubCutaneous daily  lamoTRIgine 75 milliGRAM(s) Oral two times a day  levETIRAcetam  Solution 500 milliGRAM(s) Oral two times a day  melatonin 5 milliGRAM(s) Oral at bedtime PRN  ondansetron Injectable 4 milliGRAM(s) IV Push every 4 hours PRN  piperacillin/tazobactam IVPB.. 3.375 Gram(s) IV Intermittent every 6 hours  polyethylene glycol 3350 17 Gram(s) Oral two times a day  senna 2 Tablet(s) Oral at bedtime      RADIOLOGY & ADDITIONAL TESTS:    case discussed with the resident  Available consult notes reviewed    Assessment and plan:     d/c to group home in am

## 2021-07-19 ENCOUNTER — TRANSCRIPTION ENCOUNTER (OUTPATIENT)
Age: 31
End: 2021-07-19

## 2021-07-19 VITALS — SYSTOLIC BLOOD PRESSURE: 96 MMHG | HEART RATE: 64 BPM | TEMPERATURE: 98 F | DIASTOLIC BLOOD PRESSURE: 60 MMHG

## 2021-07-19 RX ADMIN — ENOXAPARIN SODIUM 40 MILLIGRAM(S): 100 INJECTION SUBCUTANEOUS at 11:45

## 2021-07-19 RX ADMIN — POLYETHYLENE GLYCOL 3350 17 GRAM(S): 17 POWDER, FOR SOLUTION ORAL at 05:18

## 2021-07-19 RX ADMIN — Medication 0.5 MILLIGRAM(S): at 05:19

## 2021-07-19 RX ADMIN — Medication 0.5 MILLIGRAM(S): at 11:45

## 2021-07-19 RX ADMIN — LAMOTRIGINE 75 MILLIGRAM(S): 25 TABLET, ORALLY DISINTEGRATING ORAL at 05:19

## 2021-07-19 RX ADMIN — CHLORHEXIDINE GLUCONATE 1 APPLICATION(S): 213 SOLUTION TOPICAL at 05:18

## 2021-07-19 RX ADMIN — LEVETIRACETAM 500 MILLIGRAM(S): 250 TABLET, FILM COATED ORAL at 05:19

## 2021-07-19 NOTE — PROGRESS NOTE ADULT - SUBJECTIVE AND OBJECTIVE BOX
JOSS HERNANDEZ  31y  Female  afebrile  comfortable  completed antibiotics  eating well  INTERVAL EVENTS:    T(C): 36.2 (07-19-21 @ 05:02), Max: 36.2 (07-19-21 @ 05:02)  HR: 80 (07-19-21 @ 05:02) (60 - 80)  BP: 108/56 (07-19-21 @ 05:02) (94/50 - 108/56)  RR: 18 (07-19-21 @ 05:02) (18 - 18)  SpO2: 98% (07-18-21 @ 19:40) (98% - 98%)  Wt(kg): --Vital Signs Last 24 Hrs  T(C): 36.2 (19 Jul 2021 05:02), Max: 36.2 (19 Jul 2021 05:02)  T(F): 97.1 (19 Jul 2021 05:02), Max: 97.1 (19 Jul 2021 05:02)  HR: 80 (19 Jul 2021 05:02) (60 - 80)  BP: 108/56 (19 Jul 2021 05:02) (94/50 - 108/56)  BP(mean): --  RR: 18 (19 Jul 2021 05:02) (18 - 18)  SpO2: 98% (18 Jul 2021 19:40) (98% - 98%)    PHYSICAL EXAM:  GENERAL: resting comfortably  NECK: Supple, No JVD, Normal thyroid  CHEST/LUNG: Clear; No crackles or wheezing  HEART: S1, S2, Regular rate and rhythm;   ABDOMEN: Soft, Nontender, Nondistended; Bowel sounds present  EXTREMITIES: No clubbing, cyanosis, or edema  SKIN: No rashes or lesions    LABS:                          11.6   9.50  )-----------( 357      ( 18 Jul 2021 08:26 )             36.7     07-18    138  |  104  |  4<L>  ----------------------------<  62<L>  3.5   |  23  |  0.7    Ca    9.6      18 Jul 2021 08:26  Mg     1.7     07-18    TPro  7.0  /  Alb  4.2  /  TBili  0.2  /  DBili  x   /  AST  23  /  ALT  50<H>  /  AlkPhos  56  07-18              acetaminophen   Tablet .. 650 milliGRAM(s) Oral every 6 hours PRN  artificial tears (preservative free) Ophthalmic Solution 1 Drop(s) Both EYES three times a day PRN  benztropine 0.5 milliGRAM(s) Oral every 6 hours  chlorhexidine 4% Liquid 1 Application(s) Topical <User Schedule>  diphenhydrAMINE 50 milliGRAM(s) Oral every 6 hours PRN  enoxaparin Injectable 40 milliGRAM(s) SubCutaneous daily  lamoTRIgine 75 milliGRAM(s) Oral two times a day  levETIRAcetam  Solution 500 milliGRAM(s) Oral two times a day  melatonin 5 milliGRAM(s) Oral at bedtime PRN  ondansetron Injectable 4 milliGRAM(s) IV Push every 4 hours PRN  piperacillin/tazobactam IVPB.. 3.375 Gram(s) IV Intermittent every 6 hours  polyethylene glycol 3350 17 Gram(s) Oral two times a day  senna 2 Tablet(s) Oral at bedtime      RADIOLOGY & ADDITIONAL TESTS:    case discussed with the resident  Available consult notes reviewed    Assessment and plan:     d/c to group home  will f/u as op

## 2021-07-19 NOTE — PROGRESS NOTE ADULT - NSICDXPILOT_GEN_ALL_CORE
[place]

## 2021-07-19 NOTE — DISCHARGE NOTE PROVIDER - NSDCCPCAREPLAN_GEN_ALL_CORE_FT
PRINCIPAL DISCHARGE DIAGNOSIS  Diagnosis: Pneumonia  Assessment and Plan of Treatment: Aspiration pneumonia is a lung infection that develops after you aspirate (inhale) food, liquid, or vomit into your lungs. You can also aspirate food or liquid from your stomach that backs up into your esophagus. If you are not able to cough up the aspirated material, bacteria can grow in your lungs and cause an infection. Your risk is highest if you are older than 75 or live in a nursing home or long-term care center. You may be less active, bedridden, or not able to swallow or cough well. The muscles that help you swallow can become weakened by age, illness, or disease. Your risk also increases if you have a weak immune system.   You were treated with IV abx inhospital. Please follow up with primary care doctor in 1 week since discharge.      SECONDARY DISCHARGE DIAGNOSES  Diagnosis: Adnexal mass  Assessment and Plan of Treatment: You were found to have right adnexal mass 4.8x 4.1x 6.7cm and Left adnexal mass 3.4cm incidentally on imaging. Please follow up as an outpatient with ob gyn.

## 2021-07-19 NOTE — PROGRESS NOTE ADULT - PROVIDER SPECIALTY LIST ADULT
Internal Medicine
Infectious Disease
Internal Medicine
Infectious Disease

## 2021-07-19 NOTE — DISCHARGE NOTE PROVIDER - HOSPITAL COURSE
Patient is a 32 y/o female with Pmhx mental retardation (non verbal at baseline), seizure disorder presented to the hospital from [facility] for non-bilious non-bloody vomiting x 1 day, with coughing thereafter. Per group home staff patient was recently started on PO ativan for her seizure disorder. After pt throw up pt started coughing so she was brought to the hospital for further evaluation. On admission, head CT negative. Patient was found have  aspiration pneumonia on CXR and CT. Patient has been treated for aspiration pneumonia with  Zosyn 3.375 gm iv q6h as per ID x 7days.   Pt was evaluated for seizure disorder. Patient has a pmhx of seizure disorder, medication regime recently changed, started on ativan. Pt was continued on home med Keppra 500mg BID, lamotrigine 75mg BID, lorazepam 1mg TID, and melatonin for insomnia. Pt had EEG done-negative for seizure disorder.  Patient was found to have right adnexal mass 4.8x 4.1x 6.7cm and Left adnexal mass 3.4cm incidentally on ct. Pelvic ultrasound attempted- unable to perform due pt cognitive function.  wnl. Pt is to follow up as an out pt with ob gyn.    Pt is medically stable for discharge.

## 2021-07-19 NOTE — DISCHARGE NOTE NURSING/CASE MANAGEMENT/SOCIAL WORK - PATIENT PORTAL LINK FT
You can access the FollowMyHealth Patient Portal offered by [facility] by registering at the following website: [email or web address]. By joining [employer]’s FollowMyHealth portal, you will also be able to view your health information using other applications (apps) compatible with our system.

## 2021-07-19 NOTE — PROGRESS NOTE ADULT - REASON FOR ADMISSION
aspiration pneumonia

## 2021-07-19 NOTE — DISCHARGE NOTE PROVIDER - CARE PROVIDER_API CALL
Joan Fernandez (MD)  Medicine  [address] [place] [id number]  Phone: [phone]  Fax: [phone]  Follow Up Time: 1 week

## 2021-07-19 NOTE — DISCHARGE NOTE PROVIDER - NSDCMRMEDTOKEN_GEN_ALL_CORE_FT
Ativan 1 mg oral tablet: 1 tab(s) orally 3 times a day  benztropine 0.5 mg oral tablet: 1 tab(s) orally 4 times a day  diphenhydrAMINE 50 mg oral tablet: 1 tab(s) orally 4 times a day  Keppra 500 mg oral tablet: 1 tab(s) orally 2 times a day  lamoTRIgine 25 mg oral tablet, disintegrating: 3 tab(s) orally 2 times a day

## 2021-07-19 NOTE — CHART NOTE - NSCHARTNOTEFT_GEN_A_CORE
<<<RESIDENT DISCHARGE NOTE>>>     JOSS HERNANDEZ  MRN-[id number]    VITAL SIGNS:  T(F): 97.1 (07-19-21 @ 05:02), Max: 97.1 (07-19-21 @ 05:02)  HR: 80 (07-19-21 @ 05:02)  BP: 108/56 (07-19-21 @ 05:02)  SpO2: 98% (07-18-21 @ 19:40)      PHYSICAL EXAMINATION:  GEN: No acute distress  LUNGS: Clear to auscultation bilaterally, no wheezes, rales, rhonchi  HEART: Regular rate and rhythm +s1 s2  ABD: Soft, non-tender, non-distended.   EXT: no LE edema/2+PP  NEURO: unable to assess, contracted UE and LE    TEST RESULTS:                        11.6   9.50  )-----------( 357      ( 18 Jul 2021 08:26 )             36.7       07-18    138  |  104  |  4<L>  ----------------------------<  62<L>  3.5   |  23  |  0.7    Ca    9.6      18 Jul 2021 08:26  Mg     1.7     07-18    TPro  7.0  /  Alb  4.2  /  TBili  0.2  /  DBili  x   /  AST  23  /  ALT  50<H>  /  AlkPhos  56  07-18      FINAL DISCHARGE INTERVIEW:  Resident(s) Present: (Name:___Dr. Bowles__________),    DISCHARGE MEDICATION RECONCILIATION  reviewed with Attending (Name:__Dr. Underwood_________)    DISPOSITION:   [  ] Home,    [  ] Home with Visiting Nursing Services,   [  x  ]  SNF/ NH,    [   ] Acute Rehab (4A),   [   ] Other (Specify:_________)

## 2021-07-27 DIAGNOSIS — G47.00 INSOMNIA, UNSPECIFIED: ICD-10-CM

## 2021-07-27 DIAGNOSIS — I95.9 HYPOTENSION, UNSPECIFIED: ICD-10-CM

## 2021-07-27 DIAGNOSIS — E87.1 HYPO-OSMOLALITY AND HYPONATREMIA: ICD-10-CM

## 2021-07-27 DIAGNOSIS — R19.09 OTHER INTRA-ABDOMINAL AND PELVIC SWELLING, MASS AND LUMP: ICD-10-CM

## 2021-07-27 DIAGNOSIS — F73 PROFOUND INTELLECTUAL DISABILITIES: ICD-10-CM

## 2021-07-27 DIAGNOSIS — J69.0 PNEUMONITIS DUE TO INHALATION OF FOOD AND VOMIT: ICD-10-CM

## 2021-07-27 DIAGNOSIS — G40.909 EPILEPSY, UNSPECIFIED, NOT INTRACTABLE, WITHOUT STATUS EPILEPTICUS: ICD-10-CM

## 2021-08-30 ENCOUNTER — APPOINTMENT (OUTPATIENT)
Dept: NEUROLOGY | Facility: CLINIC | Age: 31
End: 2021-08-30
Payer: MEDICAID

## 2021-08-30 VITALS
TEMPERATURE: 97.9 F | HEIGHT: 62 IN | DIASTOLIC BLOOD PRESSURE: 69 MMHG | WEIGHT: 113 LBS | OXYGEN SATURATION: 97 % | BODY MASS INDEX: 20.8 KG/M2 | HEART RATE: 72 BPM | SYSTOLIC BLOOD PRESSURE: 110 MMHG

## 2021-08-30 DIAGNOSIS — Z78.9 OTHER SPECIFIED HEALTH STATUS: ICD-10-CM

## 2021-08-30 PROCEDURE — 99214 OFFICE O/P EST MOD 30 MIN: CPT

## 2021-08-30 RX ORDER — LORAZEPAM 1 MG/1
1 TABLET ORAL
Refills: 0 | Status: ACTIVE | COMMUNITY

## 2021-08-30 RX ORDER — PROPYLENE GLYCOL 0.06 MG/ML
0.6 EMULSION OPHTHALMIC
Refills: 0 | Status: ACTIVE | COMMUNITY

## 2021-08-30 RX ORDER — NUTRITIONAL SUPPLEMENT 0.06 G-1.5
LIQUID (ML) ORAL
Refills: 0 | Status: ACTIVE | COMMUNITY

## 2021-08-30 RX ORDER — PURIFIED WATER 99.03 ML/100ML
SOLUTION/ DROPS OPHTHALMIC
Refills: 0 | Status: ACTIVE | COMMUNITY

## 2021-08-31 NOTE — DATA REVIEWED
[de-identified] : EXAM:  CT BRAIN      \par \par \par PROCEDURE DATE:  07/10/2021  \par \par \par \par \par INTERPRETATION:  CLINICAL INDICATION: Vomiting. Rule out intracranial hemorrhage.\par \par Technique: CT of the head was performed without contrast.\par \par Multiple contiguous axial images were acquired from the skullbase to the vertex without the administration of intravenous contrast.  Coronal and sagittal reformations were made.\par \par COMPARISON:  prior head CT dated 2/25/2021.\par \par FINDINGS:\par \par There is similar appearing atrophy of the cerebellum and brainstem. 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 No abnormal extra-axial fluid collections are present.\par \par The calvarium is intact. The visualized , paranasal sinuses and mastoid complexes appear free of acute disease.\par \par IMPRESSION:\par No significant change since recent head CT of 2/25/2021.\par \par No evidence of intracranial hemorrhage, mass effect or midline shift.\par \par Stable appearing atrophy of the cerebellum and brainstem as well as patchy cerebral white matter hypodensity.\par \par

## 2021-08-31 NOTE — PHYSICAL EXAM
[Non-ambulatory] : Non-ambulatory [2+] : Ankle jerk left 2+ [FreeTextEntry1] : Limited physical exam due to pts severe MR\par Awake, does not follow commands, visually impaired [Motor Strength Lower Extremities Bilaterally] : strength was normal in both lower extremities [Motor Strength Upper Extremities Bilaterally] : strength was normal in both upper extremities

## 2021-08-31 NOTE — HISTORY OF PRESENT ILLNESS
[FreeTextEntry1] : Cristina is a 31 year old  nonverbal female from group home with PMH severe MR,congenital rubella and seizure disorder. Pt was under the care of Dr Shepherd and is now transitioning to a new neurologist.\par It is unclear when pts seizures started. Pt has been a group home resident for 13 years prior to that lived home with mom and aide.\par Pt requires total care at this point. She is unable to feed self, shower or get dressed. She is also incontinent of urine and stool. Pt used to walk with assistance currently only able to stand. Pt is aggressive toward self and often hits self with her knee in the face. \par According to the aide that knows pt well pt has almost daily episodes that start out with a scream, followed by eye rolling and whole body stiffening. Each episode lasts about 10-30 sec and pt is back to baseline right after. \par As per EMR in 2019 pt was brought to the ER for multiple witnessed episodes of shaking. \par Current regiment: Keppra 500 mgs BID, Lamictal 75 mgs BID\par Last levels from 5/5 Lamictal 5.5, Keppra 7.8\par REEG 7/13/21- normal

## 2021-08-31 NOTE — DISCUSSION/SUMMARY
[Safety Recommendations] : The patient was advised in regards to the risk of seizures and general seizure safety recommendations including not to be bathing alone, climbing to high places and operating heavy machinery. [Compliance with Medications] : The importance of compliance with medications was reinforced. [Bone Health Education] : Patient was educated in regards to bone health and an increased risk of osteoporosis in patients with epilepsy. [Sleep Hygiene/Sleep Disruption Risks] : Sleep hygiene and the risks of sleep disruption were discussed. [Inpatient video EEG study ordered with length of stay anticipated in days: _____] : Inpatient video EEG study ordered with length of stay anticipated in days: [unfilled] [Event capture (for localization, differential diagnosis) (typically 3-5 days)] : Event capture (for localization, differential diagnosis) (typically 3-5 days)  [Evaluation for interictal epileptiform activity, subclinical seizures, and risk stratification (typically 2-3 days)] : Evaluation for interictal epileptiform activity, subclinical seizures, and risk stratification (typically 2-3 days) [FreeTextEntry1] : Cristina is a 31 year old  nonverbal female from group home with PMH severe MR,congenital rubella and seizure disorder with daily episodes suggestive of seizure.\par Plan:\par - VEEG to capture event and medication optimization\par - Please check levels while pt is admitted\par - Follow up after VEEG is complete\par - Maintain seizure diary. \par \par \par Case discussed with Dr. Orantes\par \par Paige Dominguez, ARACELI, ACNP-BC

## 2021-09-16 ENCOUNTER — APPOINTMENT (OUTPATIENT)
Dept: NEUROLOGY | Facility: CLINIC | Age: 31
End: 2021-09-16
Payer: MEDICAID

## 2021-09-16 PROCEDURE — 95816 EEG AWAKE AND DROWSY: CPT

## 2021-10-16 ENCOUNTER — OUTPATIENT (OUTPATIENT)
Dept: OUTPATIENT SERVICES | Facility: HOSPITAL | Age: 31
LOS: 1 days | Discharge: HOME | End: 2021-10-16

## 2021-10-16 DIAGNOSIS — Z11.59 ENCOUNTER FOR SCREENING FOR OTHER VIRAL DISEASES: ICD-10-CM

## 2021-10-18 LAB — SARS-COV-2 N GENE NPH QL NAA+PROBE: NOT DETECTED

## 2021-10-19 ENCOUNTER — INPATIENT (INPATIENT)
Facility: HOSPITAL | Age: 31
LOS: 1 days | Discharge: NURSING, REHAB OR LONG-TERM CARE | End: 2021-10-21
Attending: HOSPITALIST | Admitting: HOSPITALIST
Payer: MEDICAID

## 2021-10-19 VITALS
DIASTOLIC BLOOD PRESSURE: 72 MMHG | WEIGHT: 119.05 LBS | TEMPERATURE: 97 F | RESPIRATION RATE: 16 BRPM | SYSTOLIC BLOOD PRESSURE: 134 MMHG | HEIGHT: 63 IN | HEART RATE: 72 BPM

## 2021-10-19 LAB
ALBUMIN SERPL ELPH-MCNC: 3.9 G/DL — SIGNIFICANT CHANGE UP (ref 3.5–5.2)
ALP SERPL-CCNC: 54 U/L — SIGNIFICANT CHANGE UP (ref 30–115)
ALT FLD-CCNC: 21 U/L — SIGNIFICANT CHANGE UP (ref 0–41)
ANION GAP SERPL CALC-SCNC: 9 MMOL/L — SIGNIFICANT CHANGE UP (ref 7–14)
AST SERPL-CCNC: 14 U/L — SIGNIFICANT CHANGE UP (ref 0–41)
BASOPHILS # BLD AUTO: 0.1 K/UL — SIGNIFICANT CHANGE UP (ref 0–0.2)
BASOPHILS NFR BLD AUTO: 1.1 % — HIGH (ref 0–1)
BILIRUB SERPL-MCNC: 0.2 MG/DL — SIGNIFICANT CHANGE UP (ref 0.2–1.2)
BUN SERPL-MCNC: 9 MG/DL — LOW (ref 10–20)
CALCIUM SERPL-MCNC: 9.8 MG/DL — SIGNIFICANT CHANGE UP (ref 8.5–10.1)
CHLORIDE SERPL-SCNC: 106 MMOL/L — SIGNIFICANT CHANGE UP (ref 98–110)
CO2 SERPL-SCNC: 22 MMOL/L — SIGNIFICANT CHANGE UP (ref 17–32)
CREAT SERPL-MCNC: 0.6 MG/DL — LOW (ref 0.7–1.5)
EOSINOPHIL # BLD AUTO: 0.27 K/UL — SIGNIFICANT CHANGE UP (ref 0–0.7)
EOSINOPHIL NFR BLD AUTO: 2.9 % — SIGNIFICANT CHANGE UP (ref 0–8)
GLUCOSE SERPL-MCNC: 83 MG/DL — SIGNIFICANT CHANGE UP (ref 70–99)
HCT VFR BLD CALC: 34.2 % — LOW (ref 37–47)
HGB BLD-MCNC: 11 G/DL — LOW (ref 12–16)
IMM GRANULOCYTES NFR BLD AUTO: 0.3 % — SIGNIFICANT CHANGE UP (ref 0.1–0.3)
LYMPHOCYTES # BLD AUTO: 3.26 K/UL — SIGNIFICANT CHANGE UP (ref 1.2–3.4)
LYMPHOCYTES # BLD AUTO: 35 % — SIGNIFICANT CHANGE UP (ref 20.5–51.1)
MAGNESIUM SERPL-MCNC: 1.8 MG/DL — SIGNIFICANT CHANGE UP (ref 1.8–2.4)
MCHC RBC-ENTMCNC: 27.5 PG — SIGNIFICANT CHANGE UP (ref 27–31)
MCHC RBC-ENTMCNC: 32.2 G/DL — SIGNIFICANT CHANGE UP (ref 32–37)
MCV RBC AUTO: 85.5 FL — SIGNIFICANT CHANGE UP (ref 81–99)
MONOCYTES # BLD AUTO: 1.34 K/UL — HIGH (ref 0.1–0.6)
MONOCYTES NFR BLD AUTO: 14.4 % — HIGH (ref 1.7–9.3)
NEUTROPHILS # BLD AUTO: 4.31 K/UL — SIGNIFICANT CHANGE UP (ref 1.4–6.5)
NEUTROPHILS NFR BLD AUTO: 46.3 % — SIGNIFICANT CHANGE UP (ref 42.2–75.2)
NRBC # BLD: 0 /100 WBCS — SIGNIFICANT CHANGE UP (ref 0–0)
PLATELET # BLD AUTO: 312 K/UL — SIGNIFICANT CHANGE UP (ref 130–400)
POTASSIUM SERPL-MCNC: 3.8 MMOL/L — SIGNIFICANT CHANGE UP (ref 3.5–5)
POTASSIUM SERPL-SCNC: 3.8 MMOL/L — SIGNIFICANT CHANGE UP (ref 3.5–5)
PROT SERPL-MCNC: 6.5 G/DL — SIGNIFICANT CHANGE UP (ref 6–8)
RBC # BLD: 4 M/UL — LOW (ref 4.2–5.4)
RBC # FLD: 12.5 % — SIGNIFICANT CHANGE UP (ref 11.5–14.5)
SODIUM SERPL-SCNC: 137 MMOL/L — SIGNIFICANT CHANGE UP (ref 135–146)
WBC # BLD: 9.31 K/UL — SIGNIFICANT CHANGE UP (ref 4.8–10.8)
WBC # FLD AUTO: 9.31 K/UL — SIGNIFICANT CHANGE UP (ref 4.8–10.8)

## 2021-10-19 PROCEDURE — 99221 1ST HOSP IP/OBS SF/LOW 40: CPT

## 2021-10-19 PROCEDURE — 99232 SBSQ HOSP IP/OBS MODERATE 35: CPT

## 2021-10-19 RX ORDER — ENOXAPARIN SODIUM 100 MG/ML
40 INJECTION SUBCUTANEOUS AT BEDTIME
Refills: 0 | Status: DISCONTINUED | OUTPATIENT
Start: 2021-10-19 | End: 2021-10-21

## 2021-10-19 RX ORDER — CHLORHEXIDINE GLUCONATE 213 G/1000ML
1 SOLUTION TOPICAL
Refills: 0 | Status: DISCONTINUED | OUTPATIENT
Start: 2021-10-19 | End: 2021-10-21

## 2021-10-19 RX ORDER — LANOLIN ALCOHOL/MO/W.PET/CERES
5 CREAM (GRAM) TOPICAL AT BEDTIME
Refills: 0 | Status: DISCONTINUED | OUTPATIENT
Start: 2021-10-19 | End: 2021-10-21

## 2021-10-19 RX ORDER — DIPHENHYDRAMINE HCL 50 MG
1 CAPSULE ORAL
Qty: 0 | Refills: 0 | DISCHARGE

## 2021-10-19 RX ORDER — ONDANSETRON 8 MG/1
4 TABLET, FILM COATED ORAL EVERY 8 HOURS
Refills: 0 | Status: DISCONTINUED | OUTPATIENT
Start: 2021-10-19 | End: 2021-10-21

## 2021-10-19 RX ORDER — LEVETIRACETAM 250 MG/1
500 TABLET, FILM COATED ORAL
Refills: 0 | Status: DISCONTINUED | OUTPATIENT
Start: 2021-10-19 | End: 2021-10-20

## 2021-10-19 RX ORDER — LAMOTRIGINE 25 MG/1
75 TABLET, ORALLY DISINTEGRATING ORAL
Refills: 0 | Status: DISCONTINUED | OUTPATIENT
Start: 2021-10-19 | End: 2021-10-20

## 2021-10-19 RX ORDER — IBUPROFEN 200 MG
400 TABLET ORAL EVERY 6 HOURS
Refills: 0 | Status: DISCONTINUED | OUTPATIENT
Start: 2021-10-19 | End: 2021-10-21

## 2021-10-19 RX ORDER — CHOLECALCIFEROL (VITAMIN D3) 125 MCG
2000 CAPSULE ORAL DAILY
Refills: 0 | Status: DISCONTINUED | OUTPATIENT
Start: 2021-10-19 | End: 2021-10-21

## 2021-10-19 RX ORDER — BENZTROPINE MESYLATE 1 MG
1 TABLET ORAL
Qty: 0 | Refills: 0 | DISCHARGE

## 2021-10-19 RX ADMIN — Medication 2000 UNIT(S): at 15:11

## 2021-10-19 RX ADMIN — ENOXAPARIN SODIUM 40 MILLIGRAM(S): 100 INJECTION SUBCUTANEOUS at 21:06

## 2021-10-19 RX ADMIN — LEVETIRACETAM 500 MILLIGRAM(S): 250 TABLET, FILM COATED ORAL at 17:36

## 2021-10-19 RX ADMIN — Medication 5 MILLIGRAM(S): at 21:20

## 2021-10-19 RX ADMIN — LAMOTRIGINE 75 MILLIGRAM(S): 25 TABLET, ORALLY DISINTEGRATING ORAL at 17:36

## 2021-10-19 RX ADMIN — Medication 1 MILLIGRAM(S): at 21:20

## 2021-10-19 RX ADMIN — Medication 1 DROP(S): at 21:06

## 2021-10-19 RX ADMIN — Medication 1 DROP(S): at 15:12

## 2021-10-19 NOTE — H&P ADULT - ATTENDING COMMENTS
VEEG for seizure characterization and medication afjustment  follow up with Dr Orantes for recomemendations  cont meds as rxed  discussed with team VEEG for seizure characterization and medication adjustment  follow up with Dr Orantes for recommendations  cont meds as rxed  discussed with team and spoke with aide from [facility] at bedside  increased self harming behavior noted recently

## 2021-10-19 NOTE — H&P ADULT - ASSESSMENT
31F w/PMHx profound developmental delay, Seizure D/O on AED presents with HHA for elective V-EEG, admitted to medicine service for further management    #Hx Seizure D/O  - admit to medicine  - Neurology c/s for V-EEG  - Seizure Precautions  - Ativan 2mg IVP q4h PRN for breakthrough seizures  - labs per Neuro  - DVT PPX (Lovenox)  - CHG 4% QD and PRN     #Profound developmental delay  - c/w Ativan 1mg PO BID  - c/w home medicine supplements  - HHA present at bedside

## 2021-10-19 NOTE — H&P ADULT - HISTORY OF PRESENT ILLNESS
31F w/PMHx profound developmental delay, Seizure D/O on AED presents with HHA for elective V-EEG.  HHA reports that the patient has been experiencing worsening agitation and self-harm behavior along with increased seizures.  Patient following with Neurology and recommended V-EEG evaluation.  HHA reports patient at baseline, acting as per her norm.  Eating and drinking well.

## 2021-10-19 NOTE — CONSULT NOTE ADULT - SUBJECTIVE AND OBJECTIVE BOX
Neurology/Epilepsy Consult:    JOSS HERNANDEZ 31y Female  MRN-[id number]    Patient is a 31y old  Female who presents with a chief complaint of V-EEG (19 Oct 2021 11:33)        HPI: History obtained from group home records, EMR and outpatient records reviewed. Unable to reach patient's sister Sylvie ([phone]).  Patient is a resident at [facility], she was followed by Dr. Shepherd at [facility] for many years, recently transitioned to a different neurologist and was evaluated by Dr. Orantes.   Patient resides in a group home since the age of 14yo, it is not clear when she started having seizures. At baseline patient needs assistance with all activities of daily living, she is incontinent. Patient is unable to ambulate and nonverbal, reported to have behavioral outbursts can be aggressive towards self and others.  As per seizure chart, there were no documented seizure episodes in 2020 and 2021, but per group home staff she has brief episodic events described as vocalizations followed by eye rolling and increased tone. Patient returns to baseline immediately after.  Patient was hospitalized in July 2021 for aspiration pneumonia. In June 2019 she was evaluated in the ED for breakthrough seizures, was not admitted.        PAST MEDICAL & SURGICAL HISTORY:  Severe intellectual disability  Epilepsy  Congenital rubella  Visually impaired        FAMILY HISTORY:  Unknown      Social History:  Resident at [facility]          Allergy:  No Known Allergies        Home Medications:  Ativan 1 mg oral tablet: 1 tab(s) orally 2 times a day (19 Oct 2021 11:37)  Keppra 500 mg oral tablet: 1 tab(s) orally 2 times a day (19 Oct 2021 11:37)  lamoTRIgine 25 mg oral tablet, disintegrating: 3 tab(s) orally 2 times a day (19 Oct 2021 11:37)  Melatonin 5 mg oral tablet: 1 tab(s) orally once a day (at bedtime) (19 Oct 2021 11:37)  Systane ophthalmic solution: 1 drop(s) to each affected eye 3 times a day (19 Oct 2021 11:37)  Vitamin D3 50 mcg (2000 intl units) oral tablet: 1 tab(s) orally once a day (19 Oct 2021 11:37)  ibuprofen 200 mg oral tablet: 2 tab(s) orally every 6 hours, As Needed (19 Oct 2021 11:37)  Dulcolax supp q48hrs PRN      MEDICATIONS  (STANDING):  artificial  tears Solution 1 Drop(s) Both EYES three times a day  chlorhexidine 4% Liquid 1 Application(s) Topical <User Schedule>  cholecalciferol 2000 Unit(s) Oral daily  enoxaparin Injectable 40 milliGRAM(s) SubCutaneous at bedtime  lamoTRIgine 75 milliGRAM(s) Oral two times a day  levETIRAcetam 500 milliGRAM(s) Oral two times a day  LORazepam     Tablet 1 milliGRAM(s) Oral <User Schedule>  melatonin 5 milliGRAM(s) Oral at bedtime    MEDICATIONS  (PRN):  ibuprofen  Tablet. 400 milliGRAM(s) Oral every 6 hours PRN Temp greater or equal to 38C (100.4F), Mild Pain (1 - 3)  LORazepam   Injectable 2 milliGRAM(s) IntraMuscular two times a day PRN generalized tonic-clonic seizure lasting longer than 2 minutes  ondansetron Injectable 4 milliGRAM(s) IV Push every 8 hours PRN Nausea and/or Vomiting          T(F): 96.9 (10-19-21 @ 12:51), Max: 96.9 (10-19-21 @ 12:51)  HR: 72 (10-19-21 @ 12:51) (72 - 72)  BP: 134/72 (10-19-21 @ 12:51) (134/72 - 134/72)  RR: 16 (10-19-21 @ 12:51) (16 - 16)  SpO2: --        Neurologic Examination:  Exam is limited  General: The patient is nonverbal, does not follow directions,     Cranial nerves: Visually impaired  Motor examination:   Moves all extremities  Sensory examination:  Unable to examine  Cerebellum:  Nonambulatory        Labs:   10/16/2021 COVID-19 PCR negative          Neuroimaging:  CT Head:   < from: CT Head No Cont (07.10.21 @ 13:18) >  FINDINGS:    There is similar appearing atrophy of the cerebellum and brainstem. 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    There is periventricular and scattered cortical white matter hypodensity, unchanged.     There is no intraparenchymal hematoma, mass effect or midline shift. No abnormal extra-axial fluid collections are present.    The calvarium is intact. The visualized , paranasal sinuses and mastoid complexes appear free of acute disease.    IMPRESSION:  No significant change since recent head CT of 2/25/2021.    No evidence of intracranial hemorrhage, mass effect or midline shift.    Stable appearing atrophy of the cerebellum and brainstem as well as patchy cerebral white matter hypodensity.    < end of copied text >          REEG 9/16/2021 - less than optimally organized, low amplitude excessive low voltage fast activity, borderline to mild generalized slowing, bisynchronous mild frontal slowing      < from: EEG (07.13.21 @ 09:30) >  Impression:  Technically suboptimal, but readable segments show normal segments    < end of copied text >          Assessment:  This is a 31y Female with h/o severe ID, nonverbal, congenital rubella, and epilepsy. Patient has brief episodes reported by group home suspicious for seizures.        Discussed with Dr. Orantes        Plan:   - VEEG monitoring for better characterization and treatment plan  - Seizure precautions  - Continue pre-admission seizures medications (ordered)  - Ativan 2mg IV PRN for generalized tonic-clonic seizure lasting longer than 2 minutes, or two consecutive seizures without return to baseline in-between (ordered)  - CBC, CMP, Mg, AED levels trough (ordered)  - Keep Mg above 2      Discussed with nursing team, hospitalist. Neurology/Epilepsy Consult:    JOSS HERNANDEZ 31y Female  MRN-[id number]    Patient is a 31y old  Female who presents for elective VEEG monitoring        HPI: History obtained from group home, EMR and outpatient records reviewed. Unable to reach patient's sister Sylvie ([phone]).  Patient is a resident at [facility], she was followed by Dr. Shepherd at [facility] for many years, recently transitioned to a new neurologist and was evaluated by Dr. Orantes.   Patient resides in a group home since the age of 12yo, it is not clear when she started having seizures. At baseline patient needs assistance with all activities of daily living, she is incontinent. Patient is unable to ambulate and nonverbal. She is reported to have behavioral outbursts, and can be aggressive towards self and others.  As per group home seizure chart, there were no documented seizure episodes in 2020 and 2021, but per group home staff she has brief episodic events described as vocalizations followed by eye rolling and increased tone. Patient returns to baseline immediately after.  Patient was hospitalized in July 2021 for aspiration pneumonia.   In June 2019 she was evaluated in the ED for breakthrough seizures, was not admitted.        PAST MEDICAL & SURGICAL HISTORY:  Severe intellectual disability  Epilepsy  Congenital rubella  Visually impaired        FAMILY HISTORY:  Unknown      Social History:  Resident at [facility]          Allergy:  No Known Allergies        Home Medications:  Ativan 1 mg oral tablet: 1 tab(s) orally 2 times a day (19 Oct 2021 11:37)  Keppra 500 mg oral tablet: 1 tab(s) orally 2 times a day (19 Oct 2021 11:37)  lamoTRIgine 25 mg oral tablet, disintegrating: 3 tab(s) orally 2 times a day (19 Oct 2021 11:37)  Melatonin 5 mg oral tablet: 1 tab(s) orally once a day (at bedtime) (19 Oct 2021 11:37)  Systane ophthalmic solution: 1 drop(s) to each affected eye 3 times a day (19 Oct 2021 11:37)  Vitamin D3 50 mcg (2000 intl units) oral tablet: 1 tab(s) orally once a day (19 Oct 2021 11:37)  ibuprofen 200 mg oral tablet: 2 tab(s) orally every 6 hours, As Needed (19 Oct 2021 11:37)  Dulcolax supp q48hrs PRN      MEDICATIONS  (STANDING):  artificial  tears Solution 1 Drop(s) Both EYES three times a day  chlorhexidine 4% Liquid 1 Application(s) Topical <User Schedule>  cholecalciferol 2000 Unit(s) Oral daily  enoxaparin Injectable 40 milliGRAM(s) SubCutaneous at bedtime  lamoTRIgine 75 milliGRAM(s) Oral two times a day  levETIRAcetam 500 milliGRAM(s) Oral two times a day  LORazepam     Tablet 1 milliGRAM(s) Oral <User Schedule>  melatonin 5 milliGRAM(s) Oral at bedtime    MEDICATIONS  (PRN):  ibuprofen  Tablet. 400 milliGRAM(s) Oral every 6 hours PRN Temp greater or equal to 38C (100.4F), Mild Pain (1 - 3)  LORazepam   Injectable 2 milliGRAM(s) IntraMuscular two times a day PRN generalized tonic-clonic seizure lasting longer than 2 minutes  ondansetron Injectable 4 milliGRAM(s) IV Push every 8 hours PRN Nausea and/or Vomiting          T(F): 96.9 (10-19-21 @ 12:51), Max: 96.9 (10-19-21 @ 12:51)  HR: 72 (10-19-21 @ 12:51) (72 - 72)  BP: 134/72 (10-19-21 @ 12:51) (134/72 - 134/72)  RR: 16 (10-19-21 @ 12:51) (16 - 16)  SpO2: --        Neurologic Examination:  Exam is limited  General: The patient is nonverbal, does not follow directions,     Cranial nerves: Visually impaired  Motor examination:   Moves all extremities  Sensory examination:  Unable to examine  Cerebellum:  Nonambulatory        Labs:   10/16/2021 COVID-19 PCR negative          Neuroimaging:  CT Head:   < from: CT Head No Cont (07.10.21 @ 13:18) >  FINDINGS:    There is similar appearing atrophy of the cerebellum and brainstem. 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    There is periventricular and scattered cortical white matter hypodensity, unchanged.     There is no intraparenchymal hematoma, mass effect or midline shift. No abnormal extra-axial fluid collections are present.    The calvarium is intact. The visualized , paranasal sinuses and mastoid complexes appear free of acute disease.    IMPRESSION:  No significant change since recent head CT of 2/25/2021.    No evidence of intracranial hemorrhage, mass effect or midline shift.    Stable appearing atrophy of the cerebellum and brainstem as well as patchy cerebral white matter hypodensity.    < end of copied text >          REEG 9/16/2021 - less than optimally organized, low amplitude excessive low voltage fast activity, borderline to mild generalized slowing, bisynchronous mild frontal slowing      < from: EEG (07.13.21 @ 09:30) >  Impression:  Technically suboptimal, but readable segments show normal segments    < end of copied text >          Assessment:  This is a 31y Female with h/o severe ID, nonverbal, congenital rubella, and epilepsy. Patient has brief episodes reported by group home suspicious for seizures.        Discussed with Dr. Orantes        Plan:   - VEEG monitoring for better characterization and treatment plan  - Seizure precautions  - Continue pre-admission seizures medications (ordered)  - Ativan 2mg IV PRN for generalized tonic-clonic seizure lasting longer than 2 minutes, or two consecutive seizures without return to baseline in-between (ordered)  - CBC, CMP, Mg, AED levels trough (ordered)  - Keep Mg above 2      Discussed with nursing team, hospitalist. Neurology/Epilepsy Consult:    JOSS HERNANDEZ 31y Female  MRN-[id number]    Patient is a 31y old  Female who presents for elective VEEG monitoring        HPI: History obtained from group home, EMR and outpatient records reviewed. Unable to reach patient's sister Sylvie ([phone]).  Patient is a resident at [facility], she was followed by Dr. Shepherd at [facility] for many years, recently transitioned to a new neurologist and was evaluated by Dr. Orantes.   Patient resides in a group home since the age of 14yo, it is not clear when she started having seizures. At baseline patient needs assistance with all activities of daily living, she is incontinent. Patient is unable to ambulate and nonverbal. She is reported to have behavioral outbursts, and can be aggressive towards self and others.  As per group home seizure chart, there were no documented seizure episodes in 2020 and 2021, but per group home staff she has brief episodic events described as vocalizations followed by eye rolling and increased tone. Patient returns to baseline immediately after.  Patient was hospitalized in July 2021 for aspiration pneumonia.   In June 2019 she was evaluated in the ED for breakthrough seizures, was not admitted.        PAST MEDICAL & SURGICAL HISTORY:  Severe intellectual disability  Epilepsy  Congenital rubella  Visually impaired        FAMILY HISTORY:  Unknown      Social History:  Resident at [facility]          Allergy:  No Known Allergies        Home Medications:  Ativan 1 mg oral tablet: 1 tab(s) orally 2 times a day (19 Oct 2021 11:37)  Keppra 500 mg oral tablet: 1 tab(s) orally 2 times a day (19 Oct 2021 11:37)  lamoTRIgine 25 mg oral tablet, disintegrating: 3 tab(s) orally 2 times a day (19 Oct 2021 11:37)  Melatonin 5 mg oral tablet: 1 tab(s) orally once a day (at bedtime) (19 Oct 2021 11:37)  Systane ophthalmic solution: 1 drop(s) to each affected eye 3 times a day (19 Oct 2021 11:37)  Vitamin D3 50 mcg (2000 intl units) oral tablet: 1 tab(s) orally once a day (19 Oct 2021 11:37)  ibuprofen 200 mg oral tablet: 2 tab(s) orally every 6 hours, As Needed (19 Oct 2021 11:37)  Dulcolax supp q48hrs PRN      MEDICATIONS  (STANDING):  artificial  tears Solution 1 Drop(s) Both EYES three times a day  chlorhexidine 4% Liquid 1 Application(s) Topical <User Schedule>  cholecalciferol 2000 Unit(s) Oral daily  enoxaparin Injectable 40 milliGRAM(s) SubCutaneous at bedtime  lamoTRIgine 75 milliGRAM(s) Oral two times a day  levETIRAcetam 500 milliGRAM(s) Oral two times a day  LORazepam     Tablet 1 milliGRAM(s) Oral <User Schedule>  melatonin 5 milliGRAM(s) Oral at bedtime    MEDICATIONS  (PRN):  ibuprofen  Tablet. 400 milliGRAM(s) Oral every 6 hours PRN Temp greater or equal to 38C (100.4F), Mild Pain (1 - 3)  LORazepam   Injectable 2 milliGRAM(s) IntraMuscular two times a day PRN generalized tonic-clonic seizure lasting longer than 2 minutes  ondansetron Injectable 4 milliGRAM(s) IV Push every 8 hours PRN Nausea and/or Vomiting          T(F): 96.9 (10-19-21 @ 12:51), Max: 96.9 (10-19-21 @ 12:51)  HR: 72 (10-19-21 @ 12:51) (72 - 72)  BP: 134/72 (10-19-21 @ 12:51) (134/72 - 134/72)  RR: 16 (10-19-21 @ 12:51) (16 - 16)  SpO2: --        Neurologic Examination:  Exam is limited  General: The patient is nonverbal, does not follow directions,     Cranial nerves: Visually impaired  Motor examination:   Moves all extremities  Sensory examination:  Unable to examine  Cerebellum:  Nonambulatory        Labs:   10/16/2021 COVID-19 PCR negative          Neuroimaging:  CT Head:   < from: CT Head No Cont (07.10.21 @ 13:18) >  FINDINGS:    There is similar appearing atrophy of the cerebellum and brainstem. 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    There is periventricular and scattered cortical white matter hypodensity, unchanged.     There is no intraparenchymal hematoma, mass effect or midline shift. No abnormal extra-axial fluid collections are present.    The calvarium is intact. The visualized , paranasal sinuses and mastoid complexes appear free of acute disease.    IMPRESSION:  No significant change since recent head CT of 2/25/2021.    No evidence of intracranial hemorrhage, mass effect or midline shift.    Stable appearing atrophy of the cerebellum and brainstem as well as patchy cerebral white matter hypodensity.    < end of copied text >          REEG 9/16/2021 - less than optimally organized, low amplitude excessive low voltage fast activity, borderline to mild generalized slowing, bisynchronous mild frontal slowing      < from: EEG (07.13.21 @ 09:30) >  Impression:  Technically suboptimal, but readable segments show normal segments    < end of copied text >          Assessment:  This is a 31y Female with h/o severe ID, nonverbal, congenital rubella, and epilepsy. Patient has brief episodes reported by group [place] suspicious for seizures.        Discussed with Dr. Orantes        Plan:   - VEEG monitoring for better characterization and treatment plan  - Seizure precautions  - Continue pre-admission seizures medications (ordered)  - Ativan 2mg IV PRN for generalized tonic-clonic seizure lasting longer than 2 minutes, or two consecutive seizures without return to baseline in-between (ordered)  - CBC, CMP, Mg, AED levels trough (ordered)  - Keep Mg above 2    Plan discussed with group home nurse manager Robyn [phone] and PMD Dr. Fernandez.    Discussed with nursing team, hospitalist.

## 2021-10-19 NOTE — CONSULT NOTE ADULT - ATTENDING COMMENTS
Agree with the Hx and the plan  She is here for further characterization of the events described as behavioral change and becoming tense  Will start the monitoring  no change in AED

## 2021-10-19 NOTE — CONSULT NOTE ADULT - CONSULT REASON
VEEG monitoring for characterization and treatment plan VEEG monitoring for better characterization and treatment plan

## 2021-10-19 NOTE — H&P ADULT - NSHPPHYSICALEXAM_GEN_ALL_CORE
Vitals pending    PHYSICAL EXAM:  GENERAL: appears agitated, HHA holding patient's hands so as not to inflict self harm  SKIN: No rashes or lesions  HEAD:  Atraumatic, Normocephalic  EYES: EOMI, PERRLA, conjunctiva and sclera clear  NECK: Supple, No JVD  CHEST/LUNG: Clear to auscultation bilaterally; No wheeze  HEART: Regular rate and rhythm; No murmurs, rubs, or gallops  ABDOMEN: Soft, Nontender, Nondistended; Bowel sounds present  EXTREMITIES:  No clubbing, cyanosis, or edema  CNS: non-verbal

## 2021-10-20 PROCEDURE — 99231 SBSQ HOSP IP/OBS SF/LOW 25: CPT

## 2021-10-20 PROCEDURE — 95720 EEG PHY/QHP EA INCR W/VEEG: CPT

## 2021-10-20 RX ORDER — LEVETIRACETAM 250 MG/1
750 TABLET, FILM COATED ORAL
Refills: 0 | Status: DISCONTINUED | OUTPATIENT
Start: 2021-10-20 | End: 2021-10-21

## 2021-10-20 RX ORDER — LAMOTRIGINE 25 MG/1
25 TABLET, ORALLY DISINTEGRATING ORAL ONCE
Refills: 0 | Status: COMPLETED | OUTPATIENT
Start: 2021-10-20 | End: 2021-10-20

## 2021-10-20 RX ORDER — LAMOTRIGINE 25 MG/1
100 TABLET, ORALLY DISINTEGRATING ORAL
Refills: 0 | Status: DISCONTINUED | OUTPATIENT
Start: 2021-10-20 | End: 2021-10-21

## 2021-10-20 RX ORDER — LEVETIRACETAM 250 MG/1
500 TABLET, FILM COATED ORAL
Refills: 0 | Status: DISCONTINUED | OUTPATIENT
Start: 2021-10-21 | End: 2021-10-21

## 2021-10-20 RX ADMIN — ENOXAPARIN SODIUM 40 MILLIGRAM(S): 100 INJECTION SUBCUTANEOUS at 21:35

## 2021-10-20 RX ADMIN — Medication 5 MILLIGRAM(S): at 21:36

## 2021-10-20 RX ADMIN — LEVETIRACETAM 750 MILLIGRAM(S): 250 TABLET, FILM COATED ORAL at 17:54

## 2021-10-20 RX ADMIN — Medication 2000 UNIT(S): at 11:30

## 2021-10-20 RX ADMIN — Medication 1 MILLIGRAM(S): at 21:40

## 2021-10-20 RX ADMIN — Medication 1 DROP(S): at 05:23

## 2021-10-20 RX ADMIN — Medication 1 DROP(S): at 21:35

## 2021-10-20 RX ADMIN — LAMOTRIGINE 25 MILLIGRAM(S): 25 TABLET, ORALLY DISINTEGRATING ORAL at 09:30

## 2021-10-20 RX ADMIN — Medication 1 MILLIGRAM(S): at 05:29

## 2021-10-20 RX ADMIN — LAMOTRIGINE 75 MILLIGRAM(S): 25 TABLET, ORALLY DISINTEGRATING ORAL at 05:29

## 2021-10-20 RX ADMIN — LAMOTRIGINE 100 MILLIGRAM(S): 25 TABLET, ORALLY DISINTEGRATING ORAL at 17:54

## 2021-10-20 RX ADMIN — LEVETIRACETAM 500 MILLIGRAM(S): 250 TABLET, FILM COATED ORAL at 05:29

## 2021-10-20 RX ADMIN — Medication 1 DROP(S): at 14:03

## 2021-10-20 NOTE — PROGRESS NOTE ADULT - SUBJECTIVE AND OBJECTIVE BOX
Epilepsy Attending Note:     JOSS HERNANDEZ    31y Female  MRN [id number]    Vital Signs Last 24 Hrs  T(C): 36.2 (20 Oct 2021 05:00), Max: 36.3 (19 Oct 2021 21:00)  T(F): 97.1 (20 Oct 2021 05:00), Max: 97.3 (19 Oct 2021 21:00)  HR: 61 (20 Oct 2021 05:00) (61 - 68)  BP: 95/58 (20 Oct 2021 05:34) (89/48 - 144/96)  BP(mean): --  RR: 16 (20 Oct 2021 05:00) (16 - 16)  SpO2: --                          11.0   9.31  )-----------( 312      ( 19 Oct 2021 15:23 )             34.2       10-19    137  |  106  |  9<L>  ----------------------------<  83  3.8   |  22  |  0.6<L>    Ca    9.8      19 Oct 2021 15:23  Mg     1.8     10-19    TPro  6.5  /  Alb  3.9  /  TBili  0.2  /  DBili  x   /  AST  14  /  ALT  21  /  AlkPhos  54  10-19      MEDICATIONS  (STANDING):  artificial  tears Solution 1 Drop(s) Both EYES three times a day  chlorhexidine 4% Liquid 1 Application(s) Topical <User Schedule>  cholecalciferol 2000 Unit(s) Oral daily  enoxaparin Injectable 40 milliGRAM(s) SubCutaneous at bedtime  lamoTRIgine 100 milliGRAM(s) Oral two times a day  levETIRAcetam 750 milliGRAM(s) Oral <User Schedule>  LORazepam     Tablet 1 milliGRAM(s) Oral <User Schedule>  melatonin 5 milliGRAM(s) Oral at bedtime    MEDICATIONS  (PRN):  ibuprofen  Tablet. 400 milliGRAM(s) Oral every 6 hours PRN Temp greater or equal to 38C (100.4F), Mild Pain (1 - 3)  LORazepam   Injectable 2 milliGRAM(s) IntraMuscular two times a day PRN generalized tonic-clonic seizure lasting longer than 2 minutes  ondansetron Injectable 4 milliGRAM(s) IV Push every 8 hours PRN Nausea and/or Vomiting            VEEG in the last 24 hours:    Background--------------  very low amplitude continues reaching frequencies in the range of 5-6      Focal and generalized slowing-----moderate to severe generalized slowing.     Interictal activity-------  left posterior quadrants sharps and spikes at times in a periodic pattern and also expressed over the right hemisphere    Events--------two seizures seen as screaming  and axial movements. presented as periodi low amplitude spike and polyspikes followed by attenuation    Seizures------  as above    Impression:  abnormal as above    Plan - will increase the Lamictal and Keppra.           continue the monitoring

## 2021-10-20 NOTE — PROGRESS NOTE ADULT - SUBJECTIVE AND OBJECTIVE BOX
JOSS HERNANDEZ  31y, Female  Allergy: No Known Allergies      CHIEF COMPLAINT: V-EEG (19 Oct 2021 13:15)      HPI:  31F w/PMHx profound developmental delay, Seizure D/O on AED presents with HHA for elective V-EEG.  HHA reports that the patient has been experiencing worsening agitation and self-harm behavior along with increased seizures.  Patient following with Neurology and recommended V-EEG evaluation.  HHA reports patient at baseline, acting as per her norm.  Eating and drinking well.   (19 Oct 2021 11:33)    HPI:    FAMILY HISTORY:    PAST MEDICAL & SURGICAL HISTORY:  Mental retardation  profound    Seizure        SOCIAL HISTORY  Social History:    VITALS:  T(F): 97.1, Max: 97.3 (10-19-21 @ 21:00)  HR: 61  BP: 95/58  RR: 16Vital Signs Last 24 Hrs  T(C): 36.2 (20 Oct 2021 05:00), Max: 36.3 (19 Oct 2021 21:00)  T(F): 97.1 (20 Oct 2021 05:00), Max: 97.3 (19 Oct 2021 21:00)  HR: 61 (20 Oct 2021 05:00) (61 - 68)  BP: 95/58 (20 Oct 2021 05:34) (89/48 - 144/96)  BP(mean): --  RR: 16 (20 Oct 2021 05:00) (16 - 16)  SpO2: --    PHYSICAL EXAM:  Gen: NAD, resting in bed  HEENT: Normocephalic, atraumatic  Neck: supple, no lymphadenopathy  CV: Regular rate & regular rhythm  Lungs: decreased BS at bases, no fremitus  Abdomen: Soft, BS present  Ext: Warm, well perfused      TESTS & MEASUREMENTS:                        11.0   9.31  )-----------( 312      ( 19 Oct 2021 15:23 )             34.2     10-19    137  |  106  |  9<L>  ----------------------------<  83  3.8   |  22  |  0.6<L>    Ca    9.8      19 Oct 2021 15:23  Mg     1.8     10-19    TPro  6.5  /  Alb  3.9  /  TBili  0.2  /  DBili  x   /  AST  14  /  ALT  21  /  AlkPhos  54  10-19    eGFR if Non African American: 121 mL/min/1.73M2 (10-19-21 @ 15:23)  eGFR if : 141 mL/min/1.73M2 (10-19-21 @ 15:23)    LIVER FUNCTIONS - ( 19 Oct 2021 15:23 )  Alb: 3.9 g/dL / Pro: 6.5 g/dL / ALK PHOS: 54 U/L / ALT: 21 U/L / AST: 14 U/L / GGT: x                   QRS axis to [] ° and NSR at a rate of [] BPM. There was no atrial enlargement. There was no ventricular hypertrophy. There were no ST-T changes and all intervals were normal.      INFECTIOUS DISEASES TESTING      RADIOLOGY & ADDITIONAL TESTS:  I have personally reviewed the last Chest xray  CXR      CT      CARDIOLOGY TESTING      MEDICATIONS  artificial  tears Solution 1  chlorhexidine 4% Liquid 1  cholecalciferol 2000  enoxaparin Injectable 40  lamoTRIgine 100  levETIRAcetam 750  LORazepam     Tablet 1  melatonin 5      ANTIBIOTICS:      All available historical data has been reviewed    ASSESSMENT  31y F admitted with VIDEO EEG [id number] G40.909        #Hx Seizure D/O  - admit to medicine  - Neurology c/s for V-EEG  - Seizure Precautions  - extra lamictal dose given   - Ativan 2mg IVP q4h PRN for breakthrough seizures  - labs per Neuro  - DVT PPX (Lovenox)  - CHG 4% QD and PRN     #Profound developmental delay/self harming behavior   - c/w Ativan 1mg PO BID  - c/w home medicine supplements  - HHA present at bedside

## 2021-10-21 ENCOUNTER — TRANSCRIPTION ENCOUNTER (OUTPATIENT)
Age: 31
End: 2021-10-21

## 2021-10-21 VITALS
HEART RATE: 68 BPM | RESPIRATION RATE: 16 BRPM | DIASTOLIC BLOOD PRESSURE: 63 MMHG | SYSTOLIC BLOOD PRESSURE: 96 MMHG | TEMPERATURE: 96 F

## 2021-10-21 LAB
COVID-19 SPIKE DOMAIN AB INTERP: POSITIVE
COVID-19 SPIKE DOMAIN ANTIBODY RESULT: >250 U/ML — HIGH
LAMOTRIGINE SERPL-MCNC: 4.5 UG/ML — SIGNIFICANT CHANGE UP (ref 2–20)
SARS-COV-2 IGG+IGM SERPL QL IA: >250 U/ML — HIGH
SARS-COV-2 IGG+IGM SERPL QL IA: POSITIVE

## 2021-10-21 PROCEDURE — 95720 EEG PHY/QHP EA INCR W/VEEG: CPT

## 2021-10-21 PROCEDURE — 99238 HOSP IP/OBS DSCHRG MGMT 30/<: CPT

## 2021-10-21 RX ORDER — LEVETIRACETAM 250 MG/1
1 TABLET, FILM COATED ORAL
Qty: 75 | Refills: 5
Start: 2021-10-21 | End: 2022-04-18

## 2021-10-21 RX ORDER — LAMOTRIGINE 25 MG/1
1 TABLET, ORALLY DISINTEGRATING ORAL
Qty: 60 | Refills: 5
Start: 2021-10-21 | End: 2022-04-18

## 2021-10-21 RX ORDER — LAMOTRIGINE 25 MG/1
3 TABLET, ORALLY DISINTEGRATING ORAL
Qty: 0 | Refills: 0 | DISCHARGE

## 2021-10-21 RX ORDER — LEVETIRACETAM 250 MG/1
1 TABLET, FILM COATED ORAL
Qty: 0 | Refills: 0 | DISCHARGE

## 2021-10-21 RX ADMIN — LAMOTRIGINE 100 MILLIGRAM(S): 25 TABLET, ORALLY DISINTEGRATING ORAL at 05:17

## 2021-10-21 RX ADMIN — Medication 1 MILLIGRAM(S): at 05:25

## 2021-10-21 RX ADMIN — Medication 1 DROP(S): at 05:17

## 2021-10-21 RX ADMIN — LEVETIRACETAM 500 MILLIGRAM(S): 250 TABLET, FILM COATED ORAL at 05:17

## 2021-10-21 RX ADMIN — Medication 2000 UNIT(S): at 13:13

## 2021-10-21 RX ADMIN — Medication 1 DROP(S): at 13:13

## 2021-10-21 NOTE — DISCHARGE NOTE PROVIDER - NSDCADMDATE_GEN_ALL_CORE_FT
Pt came in and picked up records along with the cds that were at the nurses station  No further action required 
19-Oct-2021 08:59

## 2021-10-21 NOTE — DISCHARGE NOTE PROVIDER - PROVIDER TOKENS
PROVIDER:[TOKEN:[[id number]],SCHEDULEDAPPT:[01/24/2022],SCHEDULEDAPPTTIME:[11:00 AM],ESTABLISHEDPATIENT:[T]]

## 2021-10-21 NOTE — DISCHARGE NOTE PROVIDER - NSDCCPCAREPLAN_GEN_ALL_CORE_FT
PRINCIPAL DISCHARGE DIAGNOSIS  Diagnosis: Seizure disorder  Assessment and Plan of Treatment: patient was placed on video EEG monitor and medications were adjusted by neurologist - new medication levels were drawn and neurologist will tamiko arias . If there are uncontrolled seizures call 911/return to hospital

## 2021-10-21 NOTE — PROGRESS NOTE ADULT - SUBJECTIVE AND OBJECTIVE BOX
Epilepsy Team Discharge Note:    VEEG monitoring completed, patient is cleared for discharge from neurology standpoint.    Follow up neurology appointment is scheduled with Dr. Orantes   for January 24, 2022 at 11 am.    [address] [place] [address], [address]  [phone]    Discharge seizure medications are:  Lamictal 100mg q12hrs (dose increased)  Keppra 500mg in AM & 750mg in HS (dose increased)    Rx sent to the pharmacy.    AED levels trough were sent on admission, results still pending. Group [facility] will contacted with further recommendations when results received.    Detailed instructions regarding seizure precautions and follow up plan are given to the group home charge nurse Robyn [phone], all questions answered.     Discussed with medical and nursing teams.   Epilepsy Team Discharge Note:    VEEG reviewed by Dr. Orantes. No seizures in the last 24 hrs.  VEEG monitoring completed, patient is cleared for discharge from neurology standpoint.    Follow up neurology appointment is scheduled with Dr. Orantes   for January 24, 2022 at 11 am.    [address] [place] [address], [address]  [phone]    Discharge seizure medications are:  Lamictal 100mg q12hrs (dose increased)  Keppra 500mg in AM & 750mg in HS (dose increased)    Rx sent to the pharmacy.    AED levels trough were sent on admission, results still pending. Group home will contacted with further recommendations when results received.    Detailed instructions regarding seizure precautions and follow up plan are given to the group home charge nurse Robyn [phone], all questions answered.     Discussed with hospitalist and nursing teams.   Epilepsy Team Discharge Note:    VEEG reviewed by Dr. Orantes. No seizures in the last 24 hrs.  VEEG monitoring completed, patient is cleared for discharge from neurology standpoint.    Follow up neurology appointment is scheduled with Dr. Orantes   for January 24, 2022 at 11 am.    [address] [place] [address], [address]  [phone]    Discharge seizure medications are:  Lamictal 100mg q12hrs (dose increased)  Keppra 500mg in AM & 750mg in HS (dose increased)    Rx sent to the pharmacy.    AED levels trough were sent on admission, results still pending. Group home will be contacted with further recommendations when results received.    Detailed instructions regarding seizure precautions and follow up plan are given to the group home charge nurse Robyn [phone], all questions answered.     Discussed with hospitalist and nursing teams.

## 2021-10-21 NOTE — DISCHARGE NOTE PROVIDER - NSDCMRMEDTOKEN_GEN_ALL_CORE_FT
Ativan 1 mg oral tablet: 1 tab(s) orally 2 times a day  ibuprofen 200 mg oral tablet: 2 tab(s) orally every 6 hours, As Needed  Keppra 500 mg oral tablet: 1 tablet in the morning &amp; one and a half tablet at night  LaMICtal 100 mg oral tablet: 1 tablet orally every 12 hours   Melatonin 5 mg oral tablet: 1 tab(s) orally once a day (at bedtime)  Systane ophthalmic solution: 1 drop(s) to each affected eye 3 times a day  Vitamin D3 50 mcg (2000 intl units) oral tablet: 1 tab(s) orally once a day

## 2021-10-21 NOTE — DISCHARGE NOTE PROVIDER - CARE PROVIDERS DIRECT ADDRESSES
[email or web address]

## 2021-10-21 NOTE — DISCHARGE NOTE PROVIDER - NSDCPNSUBOBJ_GEN_ALL_CORE
JOSS HERNANDEZ  31y, Female  Allergy: No Known Allergies      CHIEF COMPLAINT: V-EEG (19 Oct 2021 13:15)      HPI:  31F w/PMHx profound developmental delay, Seizure D/O on AED presents with HHA for elective V-EEG.  HHA reports that the patient has been experiencing worsening agitation and self-harm behavior along with increased seizures.  Patient following with Neurology and recommended V-EEG evaluation.  HHA reports patient at baseline, acting as per her norm.  Eating and drinking well.   (19 Oct 2021 11:33)    HPI:    FAMILY HISTORY:    PAST MEDICAL & SURGICAL HISTORY:  Mental retardation  profound    Seizure        SOCIAL HISTORY  Social History:    ICU Vital Signs Last 24 Hrs  T(C): 36.3 (21 Oct 2021 05:00), Max: 36.5 (20 Oct 2021 14:19)  T(F): 97.3 (21 Oct 2021 05:00), Max: 97.7 (20 Oct 2021 14:19)  HR: 85 (21 Oct 2021 05:00) (75 - 88)  BP: 96/63 (21 Oct 2021 05:00) (83/52 - 96/63)  BP(mean): --  ABP: --  ABP(mean): --  RR: 16 (21 Oct 2021 05:00) (16 - 16)  SpO2: --      PHYSICAL EXAM:  Gen: NAD, resting in bed  HEENT: Normocephalic, atraumatic  Neck: supple, no lymphadenopathy  CV: Regular rate & regular rhythm  Lungs: decreased BS at bases, no fremitus  Abdomen: Soft, BS present  Ext: Warm, well perfused      TESTS & MEASUREMENTS:                        11.0   9.31  )-----------( 312      ( 19 Oct 2021 15:23 )             34.2     10-19    137  |  106  |  9<L>  ----------------------------<  83  3.8   |  22  |  0.6<L>    Ca    9.8      19 Oct 2021 15:23  Mg     1.8     10-19    TPro  6.5  /  Alb  3.9  /  TBili  0.2  /  DBili  x   /  AST  14  /  ALT  21  /  AlkPhos  54  10-19    eGFR if Non African American: 121 mL/min/1.73M2 (10-19-21 @ 15:23)  eGFR if : 141 mL/min/1.73M2 (10-19-21 @ 15:23)    LIVER FUNCTIONS - ( 19 Oct 2021 15:23 )  Alb: 3.9 g/dL / Pro: 6.5 g/dL / ALK PHOS: 54 U/L / ALT: 21 U/L / AST: 14 U/L / GGT: x                   QRS axis to [] ° and NSR at a rate of [] BPM. There was no atrial enlargement. There was no ventricular hypertrophy. There were no ST-T changes and all intervals were normal.      INFECTIOUS DISEASES TESTING      RADIOLOGY & ADDITIONAL TESTS:  I have personally reviewed the last Chest xray  CXR      CT      CARDIOLOGY TESTING  MEDICATIONS  (STANDING):  artificial  tears Solution 1 Drop(s) Both EYES three times a day  chlorhexidine 4% Liquid 1 Application(s) Topical <User Schedule>  cholecalciferol 2000 Unit(s) Oral daily  enoxaparin Injectable 40 milliGRAM(s) SubCutaneous at bedtime  lamoTRIgine 100 milliGRAM(s) Oral two times a day  levETIRAcetam 750 milliGRAM(s) Oral <User Schedule>  levETIRAcetam 500 milliGRAM(s) Oral <User Schedule>  LORazepam     Tablet 1 milliGRAM(s) Oral <User Schedule>  melatonin 5 milliGRAM(s) Oral at bedtime    MEDICATIONS  (PRN):  ibuprofen  Tablet. 400 milliGRAM(s) Oral every 6 hours PRN Temp greater or equal to 38C (100.4F), Mild Pain (1 - 3)  LORazepam   Injectable 2 milliGRAM(s) IntraMuscular two times a day PRN generalized tonic-clonic seizure lasting longer than 2 minutes  ondansetron Injectable 4 milliGRAM(s) IV Push every 8 hours PRN Nausea and/or Vomiting        ANTIBIOTICS:      All available historical data has been reviewed    ASSESSMENT  31y F admitted with VIDEO EEG [id number] G40.909        #Hx Seizure D/O  - admit to medicine  - Neurology c/s for V-EEG  - Seizure Precautions  - dose of keppra and lamical increased per neuro   - Ativan 2mg IVP q4h PRN for breakthrough seizures  - labs per Neuro  - DVT PPX (Lovenox)  - CHG 4% QD and PRN     #Profound developmental delay/self harming behavior   - c/w Ativan 1mg PO BID  - c/w home medicine supplements      discussed with Dr FADY cuellar for dc back to [facility]  outpt follow up for med level and adjustment   time spent 35 mins
none

## 2021-10-21 NOTE — DISCHARGE NOTE PROVIDER - HOSPITAL COURSE
31F w/PMHx profound developmental delay, Seizure D/O on AED presents with A for elective V-EEG.  HHA reports that the patient has been experiencing worsening agitation and self-harm behavior along with increased seizures.  Patient following with Neurology and recommended V-EEG evaluation.  HHA reports patient at baseline, acting as per her norm.  Eating and drinking well.  PAST MEDICAL & SURGICAL HISTORY:  Severe intellectual disability  Epilepsy  Congenital rubella  Visually impairedBackground--------------  very low amplitude continues reaching frequencies in the range of 5-6      Focal and generalized slowing-----moderate to severe generalized slowing.     Interictal activity-------  left posterior quadrants sharps and spikes at times in a periodic pattern and also expressed over the right hemisphere    Events--------two seizures seen as screaming  and axial movements. presented as periodi low amplitude spike and polyspikes followed by attenuation    Seizures------  as above    Impression:  abnormal as above    Plan - will increase the Lamictal and Keppra.           continue the monitoring  VEEG reviewed by Dr. Orantes. No seizures in the last 24 hrs.  VEEG monitoring completed, patient is cleared for discharge from neurology standpoint.    Follow up neurology appointment is scheduled with Dr. Orantes   for January 24, 2022 at 11 am.    [address] [place] [address], [address]  [phone]    Discharge seizure medications are:  Lamictal 100mg q12hrs (dose increased)  Keppra 500mg in AM & 750mg in HS (dose increased)    Rx sent to the pharmacy.    AED levels trough were sent on admission, results still pending. Group home will contacted with further recommendations when results received.

## 2021-10-21 NOTE — DISCHARGE NOTE NURSING/CASE MANAGEMENT/SOCIAL WORK - PATIENT PORTAL LINK FT
You can access the FollowMyHealth Patient Portal offered by [facility] by registering at the following website: [email or web address]. By joining [facility]’s FollowMyHealth portal, you will also be able to view your health information using other applications (apps) compatible with our system.

## 2021-10-21 NOTE — DISCHARGE NOTE PROVIDER - CARE PROVIDER_API CALL
Parag Orantes)  Neurology  [address] [place] [address], [address] [place] [address]  Phone: [phone]  Fax: [phone]  Established Patient  Scheduled Appointment: 01/24/2022 11:00 AM

## 2021-10-22 LAB — LEVETIRACETAM SERPL-MCNC: 9.9 UG/ML — LOW (ref 10–40)

## 2021-10-27 DIAGNOSIS — Z72.89 OTHER PROBLEMS RELATED TO LIFESTYLE: ICD-10-CM

## 2021-10-27 DIAGNOSIS — G40.909 EPILEPSY, UNSPECIFIED, NOT INTRACTABLE, WITHOUT STATUS EPILEPTICUS: ICD-10-CM

## 2021-10-27 DIAGNOSIS — F73 PROFOUND INTELLECTUAL DISABILITIES: ICD-10-CM

## 2021-10-27 DIAGNOSIS — R45.1 RESTLESSNESS AND AGITATION: ICD-10-CM

## 2021-10-27 DIAGNOSIS — F81.9 DEVELOPMENTAL DISORDER OF SCHOLASTIC SKILLS, UNSPECIFIED: ICD-10-CM

## 2021-10-27 DIAGNOSIS — H54.7 UNSPECIFIED VISUAL LOSS: ICD-10-CM

## 2021-11-30 ENCOUNTER — OUTPATIENT (OUTPATIENT)
Dept: OUTPATIENT SERVICES | Facility: HOSPITAL | Age: 31
LOS: 1 days | Discharge: HOME | End: 2021-11-30

## 2021-11-30 VITALS
HEART RATE: 67 BPM | SYSTOLIC BLOOD PRESSURE: 104 MMHG | WEIGHT: 119.05 LBS | RESPIRATION RATE: 14 BRPM | OXYGEN SATURATION: 98 % | HEIGHT: 62 IN | DIASTOLIC BLOOD PRESSURE: 62 MMHG | TEMPERATURE: 99 F

## 2021-11-30 DIAGNOSIS — Z01.818 ENCOUNTER FOR OTHER PREPROCEDURAL EXAMINATION: ICD-10-CM

## 2021-11-30 DIAGNOSIS — K02.9 DENTAL CARIES, UNSPECIFIED: ICD-10-CM

## 2021-11-30 LAB
ALBUMIN SERPL ELPH-MCNC: 4.1 G/DL — SIGNIFICANT CHANGE UP (ref 3.5–5.2)
ALP SERPL-CCNC: 61 U/L — SIGNIFICANT CHANGE UP (ref 30–115)
ALT FLD-CCNC: 34 U/L — SIGNIFICANT CHANGE UP (ref 0–41)
ANION GAP SERPL CALC-SCNC: 11 MMOL/L — SIGNIFICANT CHANGE UP (ref 7–14)
AST SERPL-CCNC: 16 U/L — SIGNIFICANT CHANGE UP (ref 0–41)
BASOPHILS # BLD AUTO: 0.08 K/UL — SIGNIFICANT CHANGE UP (ref 0–0.2)
BASOPHILS NFR BLD AUTO: 1.1 % — HIGH (ref 0–1)
BILIRUB SERPL-MCNC: <0.2 MG/DL — SIGNIFICANT CHANGE UP (ref 0.2–1.2)
BUN SERPL-MCNC: 8 MG/DL — LOW (ref 10–20)
CALCIUM SERPL-MCNC: 9.1 MG/DL — SIGNIFICANT CHANGE UP (ref 8.5–10.1)
CHLORIDE SERPL-SCNC: 106 MMOL/L — SIGNIFICANT CHANGE UP (ref 98–110)
CO2 SERPL-SCNC: 21 MMOL/L — SIGNIFICANT CHANGE UP (ref 17–32)
CREAT SERPL-MCNC: 0.7 MG/DL — SIGNIFICANT CHANGE UP (ref 0.7–1.5)
EOSINOPHIL # BLD AUTO: 0.17 K/UL — SIGNIFICANT CHANGE UP (ref 0–0.7)
EOSINOPHIL NFR BLD AUTO: 2.4 % — SIGNIFICANT CHANGE UP (ref 0–8)
GLUCOSE SERPL-MCNC: 98 MG/DL — SIGNIFICANT CHANGE UP (ref 70–99)
HCT VFR BLD CALC: 34.4 % — LOW (ref 37–47)
HGB BLD-MCNC: 11 G/DL — LOW (ref 12–16)
IMM GRANULOCYTES NFR BLD AUTO: 0.3 % — SIGNIFICANT CHANGE UP (ref 0.1–0.3)
LYMPHOCYTES # BLD AUTO: 1.79 K/UL — SIGNIFICANT CHANGE UP (ref 1.2–3.4)
LYMPHOCYTES # BLD AUTO: 25.3 % — SIGNIFICANT CHANGE UP (ref 20.5–51.1)
MCHC RBC-ENTMCNC: 26.9 PG — LOW (ref 27–31)
MCHC RBC-ENTMCNC: 32 G/DL — SIGNIFICANT CHANGE UP (ref 32–37)
MCV RBC AUTO: 84.1 FL — SIGNIFICANT CHANGE UP (ref 81–99)
MONOCYTES # BLD AUTO: 0.92 K/UL — HIGH (ref 0.1–0.6)
MONOCYTES NFR BLD AUTO: 13 % — HIGH (ref 1.7–9.3)
NEUTROPHILS # BLD AUTO: 4.09 K/UL — SIGNIFICANT CHANGE UP (ref 1.4–6.5)
NEUTROPHILS NFR BLD AUTO: 57.9 % — SIGNIFICANT CHANGE UP (ref 42.2–75.2)
NRBC # BLD: 0 /100 WBCS — SIGNIFICANT CHANGE UP (ref 0–0)
PLATELET # BLD AUTO: 342 K/UL — SIGNIFICANT CHANGE UP (ref 130–400)
POTASSIUM SERPL-MCNC: 4.8 MMOL/L — SIGNIFICANT CHANGE UP (ref 3.5–5)
POTASSIUM SERPL-SCNC: 4.8 MMOL/L — SIGNIFICANT CHANGE UP (ref 3.5–5)
PROT SERPL-MCNC: 6.8 G/DL — SIGNIFICANT CHANGE UP (ref 6–8)
RBC # BLD: 4.09 M/UL — LOW (ref 4.2–5.4)
RBC # FLD: 13.1 % — SIGNIFICANT CHANGE UP (ref 11.5–14.5)
SODIUM SERPL-SCNC: 138 MMOL/L — SIGNIFICANT CHANGE UP (ref 135–146)
WBC # BLD: 7.07 K/UL — SIGNIFICANT CHANGE UP (ref 4.8–10.8)
WBC # FLD AUTO: 7.07 K/UL — SIGNIFICANT CHANGE UP (ref 4.8–10.8)

## 2021-11-30 NOTE — H&P PST ADULT - NSICDXPASTMEDICALHX_GEN_ALL_CORE_FT
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

## 2021-11-30 NOTE — H&P PST ADULT - VASCULAR
Render Post-Care Instructions In Note?: yes Equal and normal pulses (carotid, femoral, dorsalis pedis)

## 2021-11-30 NOTE — H&P PST ADULT - HISTORY OF PRESENT ILLNESS
31y Female presents today for presurgical testing for complete oral rehabilitation under general anesthesia. Patient arrives via wheelchair accompanied by her DSA ([facility] , [facility]).   Per DSA [facility] the patient offers no complaints and only cries out when in distress. She denies observing any of the following: CP, palpitations, SOB, cough, or dysuria. No recent URI or UTI. DSA [facility] denies any illness at the Group Home in any of the residents or staff members.  Stated exercise tolerance is FOS/ wheelchair bound          PURVI screen reviewed    Patient denies any recent personal exposure to COVID19. Denies any sick contacts. Patient denies any travel within the past 30 days. Patient was instructed to quarantine until after procedure    K02.9/,,  Dental caries  Encounter for other preprocedural examination  ^K02.9/,,    PMH/PSH/  Dental caries  Mental retardation  Seizure    Anesthesia Alert  NO--Difficult Airway  NO--History of neck surgery or radiation  NO--Limited ROM of neck  NO--History of Malignant hyperthermia  NO--Personal or family history of Pseudocholinesterase deficiency.  NO--Prior Anesthesia Complication  NO--Latex Allergy  NO--Loose teeth  NO--History of Rheumatoid Arthritis  NO--PURVI  NO--Bleeding risk  NO--Other_____    Patient states that this is their complete medical history and list of medications

## 2021-12-21 NOTE — ASU PATIENT PROFILE, ADULT - FALL HARM RISK - HARM RISK INTERVENTIONS
Assistance OOB with selected safe patient handling equipment/Communicate Risk of Fall with Harm to all staff/Discuss with provider need for PT consult/Monitor for mental status changes/Monitor gait and stability/Move patient closer to nurses' station/Provide patient with walking aids - walker, cane, crutches/Reinforce activity limits and safety measures with patient and family/Reorient to person, place and time as needed/Tailored Fall Risk Interventions/Toileting schedule using arm’s reach rule for commode and bathroom/Use of alarms - bed, chair and/or voice tab/Visual Cue: Yellow wristband and red socks/Bed in lowest position, wheels locked, appropriate side rails in place/Call bell, personal items and telephone in reach/Instruct patient to call for assistance before getting out of bed or chair/Non-slip footwear when patient is out of bed/[place] to call system/Physically safe environment - no spills, clutter or unnecessary equipment/Purposeful Proactive Rounding/Room/bathroom lighting operational, light cord in reach

## 2021-12-22 ENCOUNTER — OUTPATIENT (OUTPATIENT)
Dept: OUTPATIENT SERVICES | Facility: HOSPITAL | Age: 31
LOS: 1 days | Discharge: HOME | End: 2021-12-22

## 2021-12-22 VITALS
OXYGEN SATURATION: 97 % | DIASTOLIC BLOOD PRESSURE: 61 MMHG | HEIGHT: 62 IN | SYSTOLIC BLOOD PRESSURE: 109 MMHG | TEMPERATURE: 98 F | HEART RATE: 54 BPM | RESPIRATION RATE: 18 BRPM | WEIGHT: 119.05 LBS

## 2021-12-22 VITALS
TEMPERATURE: 97 F | OXYGEN SATURATION: 99 % | HEART RATE: 60 BPM | DIASTOLIC BLOOD PRESSURE: 81 MMHG | RESPIRATION RATE: 19 BRPM | SYSTOLIC BLOOD PRESSURE: 126 MMHG

## 2021-12-22 DIAGNOSIS — Z78.9 OTHER SPECIFIED HEALTH STATUS: ICD-10-CM

## 2021-12-22 RX ORDER — MORPHINE SULFATE 50 MG/1
2 CAPSULE, EXTENDED RELEASE ORAL
Refills: 0 | Status: DISCONTINUED | OUTPATIENT
Start: 2021-12-22 | End: 2021-12-22

## 2021-12-22 RX ORDER — ONDANSETRON 8 MG/1
4 TABLET, FILM COATED ORAL ONCE
Refills: 0 | Status: DISCONTINUED | OUTPATIENT
Start: 2021-12-22 | End: 2022-01-05

## 2021-12-22 RX ORDER — SODIUM CHLORIDE 9 MG/ML
1000 INJECTION, SOLUTION INTRAVENOUS
Refills: 0 | Status: DISCONTINUED | OUTPATIENT
Start: 2021-12-22 | End: 2022-01-05

## 2021-12-22 RX ADMIN — SODIUM CHLORIDE 100 MILLILITER(S): 9 INJECTION, SOLUTION INTRAVENOUS at 12:35

## 2021-12-22 NOTE — BRIEF OPERATIVE NOTE - NSICDXBRIEFPROCEDURE_GEN_ALL_CORE_FT
PROCEDURES:  Dental examination with x-ray imaging, dental cleaning, and restoration 22-Dec-2021 12:14:39  Laquita Lozano

## 2021-12-22 NOTE — ASU DISCHARGE PLAN (ADULT/PEDIATRIC) - BATHING
R/F Valium    Patient scheduled for MRI on 06/17/19 at [facility]. with adult supervision for 24 hours

## 2021-12-22 NOTE — ASU DISCHARGE PLAN (ADULT/PEDIATRIC) - NS MD DC FALL RISK RISK
For information on Fall & Injury Prevention, visit: [email or web address] OR  [email or web address] OR  https://www.cdc.gov/steadi/patient.html

## 2021-12-22 NOTE — ASU DISCHARGE PLAN (ADULT/PEDIATRIC) - FOLLOW UP APPOINTMENTS
In case of emergency, call [phone] and ask for the dental resident on call/[facility]:  [facility] In case of emergency, call [phone] and ask for the dental resident on call/[facility]:  [facility]

## 2021-12-22 NOTE — CHART NOTE - NSCHARTNOTEFT_GEN_A_CORE
PACU ANESTHESIA ADMISSION NOTE      Procedure: Dental examination with x-ray imaging, dental cleaning, and restoration      Post op diagnosis:  Dental caries        ____  Intubated  TV:______       Rate: ______      FiO2: ______    __X__  Patent Airway    _X__  Full return of protective reflexes    ___X_  Full recovery from anesthesia / back to baseline     Vitals:   T: 36.1          R: 14                 BP: 123/80                 Sat: 100                  P: 80      Mental Status:  _X___ Awake   _____ Alert   _____ Drowsy   _____ Sedated    Nausea/Vomiting:  _X___ NO  ______Yes,   See Post - Op Orders          Pain Scale (0-10):  __0___    Treatment: ____ None    ____ See Post - Op/PCA Orders    Post - Operative Fluids:   ____ Oral   __X__ See Post - Op Orders    Plan: Discharge:   _X___Home       _____Floor     _____Critical Care    _____  Other:_________________    Comments: Patient dropped off to PACU. VSS. Airway patent. Pt awake and at baseline mental status. Report to PACU RN at bedside. No anesthesia complications.

## 2021-12-22 NOTE — ASU PREOP CHECKLIST - BP NONINVASIVE SYSTOLIC (MM HG)
Patient presents with:  Leg Pain: RC room 6: patient is here for R leg pain       HPI:  Pt presents with c/o pain in her R calf that started yesterday. No injury or inciting event. No unusual exercise. Pt does not think it is swollen or red.   No recent then constipated, now have them   • Loss of appetite 9/20/18    Did not want food much, now eating more regularly   • Mouth sores     Sometimes appear, none right now   • Nausea 10/9/2018    Have had times of feeling nausea   • Painful swallowing 10/15/201 pt's FH and OCP use. Stat US to r/o. Discussed if negative for DVT then likely ms strain. Discussed gentle stretching. Watch and wait. Call with new sxs, questions or problems. No orders of the defined types were placed in this encounter.       Meds 109

## 2021-12-22 NOTE — ASU DISCHARGE PLAN (ADULT/PEDIATRIC) - SPECIFY DIET AND FLUID
soft food diet as tolerated, drink water to stay hydrated  no straw for 24 hours, avoid rinsing/spitting for 24 hours

## 2021-12-22 NOTE — ASU DISCHARGE PLAN (ADULT/PEDIATRIC) - CARE PROVIDER_API CALL
Laquita Lozano (DDS)  Dentistry  [address] [place] [address]  Phone: [phone]  Fax: [phone]  Established Patient  Follow Up Time:

## 2021-12-27 DIAGNOSIS — K04.7 PERIAPICAL ABSCESS WITHOUT SINUS: ICD-10-CM

## 2021-12-27 DIAGNOSIS — G40.909 EPILEPSY, UNSPECIFIED, NOT INTRACTABLE, WITHOUT STATUS EPILEPTICUS: ICD-10-CM

## 2021-12-27 DIAGNOSIS — K02.9 DENTAL CARIES, UNSPECIFIED: ICD-10-CM

## 2021-12-27 DIAGNOSIS — H54.7 UNSPECIFIED VISUAL LOSS: ICD-10-CM

## 2021-12-27 DIAGNOSIS — F79 UNSPECIFIED INTELLECTUAL DISABILITIES: ICD-10-CM

## 2022-03-02 ENCOUNTER — APPOINTMENT (OUTPATIENT)
Dept: NEUROLOGY | Facility: CLINIC | Age: 32
End: 2022-03-02
Payer: MEDICAID

## 2022-03-02 PROBLEM — Z86.69 PERSONAL HISTORY OF OTHER DISEASES OF THE NERVOUS SYSTEM AND SENSE ORGANS: Chronic | Status: ACTIVE | Noted: 2021-11-30

## 2022-03-02 PROBLEM — F98.4 STEREOTYPED MOVEMENT DISORDERS: Chronic | Status: ACTIVE | Noted: 2021-11-30

## 2022-03-02 PROBLEM — H54.7 UNSPECIFIED VISUAL LOSS: Chronic | Status: ACTIVE | Noted: 2021-11-30

## 2022-03-02 PROBLEM — M21.371 FOOT DROP, RIGHT FOOT: Chronic | Status: ACTIVE | Noted: 2021-11-30

## 2022-03-02 PROBLEM — F79 UNSPECIFIED INTELLECTUAL DISABILITIES: Chronic | Status: ACTIVE | Noted: 2019-02-17

## 2022-03-02 PROCEDURE — 99213 OFFICE O/P EST LOW 20 MIN: CPT

## 2022-03-02 RX ORDER — LEVETIRACETAM 500 MG/1
500 TABLET, FILM COATED ORAL TWICE DAILY
Qty: 60 | Refills: 3 | Status: DISCONTINUED | COMMUNITY
End: 2022-03-02

## 2022-03-02 RX ORDER — LAMOTRIGINE 25 MG/1
25 TABLET ORAL TWICE DAILY
Qty: 180 | Refills: 3 | Status: DISCONTINUED | COMMUNITY
End: 2022-03-02

## 2022-03-02 RX ORDER — AMOXICILLIN AND CLAVULANATE POTASSIUM 875; 125 MG/1; MG/1
875-125 TABLET, COATED ORAL
Qty: 14 | Refills: 1 | Status: DISCONTINUED | COMMUNITY
Start: 2021-03-23 | End: 2022-03-02

## 2022-03-03 NOTE — DISCUSSION/SUMMARY
[FreeTextEntry1] : Cristina is a 31 year old nonverbal female from a group home with PMH of severe MR, congenital rubella and seizure disorder.\par Plan:\par - Increase Lamictal to 125 mgs BID\par - Continue same dose of Keppra 500 in am and 750 in pm\par - Start Zonisamide 100 mgs Q HS\par - Rx given for new blood work to be done before next visit\par - Discussed importance of accurate seizure diary documentation\par - Follow up 2-3 months\par \par Case discussed with Dr. Orantes\par \par Paige Dominguez, DNP, ACNP-BC

## 2022-03-03 NOTE — PHYSICAL EXAM
[FreeTextEntry1] :  Neurologic Examination: Exam is limited General: The patient is nonverbal, does not follow directions, Cranial nerves: Visually impaired Motor examination: Moves all extremities Sensory examination: Unable to examine Cerebellum: Nonambulatory

## 2022-03-03 NOTE — HISTORY OF PRESENT ILLNESS
[FreeTextEntry1] : Cristina is a 31 year old nonverbal female from a group home with PMH of severe MR, congenital rubella and seizure disorder. Pt is here today for a follow up after VEEG monitoring. \par 10/2021 pt had VEEG for 24 hours which was abnormal due to lest posterior quadrants sharps and spikes at times in a periodic pattern and also expressed over the right hemisphere. Two seizures seen as screaming and axial movements presented as periodic low amplitude spike and polyspikes following by attenuation.\par Pts Keppra was increased by 250 mgs  500/750, Lamictal incresed to 100 mgs BID from 75 mgs.\par Blood work done. Keppra 17.7, Lamictal 5.8.\par Seizure diary that was provided reports zero seizures. However the aide that is with the pt states that pt still having episodes of body stiffening and screaming. \par Cristina can be aggressive towards self and others and is on a one to one sit at all times. \par \par At baseline patient needs assistance with all activities of daily living, she is incontinent. Patient is unable to ambulate and nonverbal. \par \par \par \par \par \par

## 2022-03-14 ENCOUNTER — OUTPATIENT (OUTPATIENT)
Dept: OUTPATIENT SERVICES | Facility: HOSPITAL | Age: 32
LOS: 1 days | Discharge: HOME | End: 2022-03-14
Payer: MEDICAID

## 2022-03-14 DIAGNOSIS — R10.2 PELVIC AND PERINEAL PAIN: ICD-10-CM

## 2022-03-14 PROCEDURE — 76856 US EXAM PELVIC COMPLETE: CPT | Mod: 26

## 2022-05-31 ENCOUNTER — RX RENEWAL (OUTPATIENT)
Age: 32
End: 2022-05-31

## 2022-06-15 ENCOUNTER — RX RENEWAL (OUTPATIENT)
Age: 32
End: 2022-06-15

## 2022-06-21 ENCOUNTER — RX RENEWAL (OUTPATIENT)
Age: 32
End: 2022-06-21

## 2022-06-24 ENCOUNTER — APPOINTMENT (OUTPATIENT)
Dept: NEUROLOGY | Facility: CLINIC | Age: 32
End: 2022-06-24
Payer: MEDICAID

## 2022-06-24 PROCEDURE — 99213 OFFICE O/P EST LOW 20 MIN: CPT

## 2022-06-24 NOTE — PHYSICAL EXAM
[FreeTextEntry1] :  Neurologic Examination: Exam is limited. Arrived in a wheelchair sleeping. \par General: The patient is nonverbal, does not follow directions.\par  Cerebellum: Nonambulatory

## 2022-06-24 NOTE — HISTORY OF PRESENT ILLNESS
[FreeTextEntry1] : Cristina is a 32 year old nonverbal female from a group home with PMH of severe MR, congenital rubella and seizure disorder presented for a follow up. \par Cristina was sleeping in her wheelchair and considering her usual self harming behavior I did not wake her up for the exam. As per the aide Cristina just fell asleep in the ride to the appt.\par Just as last time seizure diary says no seizures. As per aide pt continues to have frequent screaming episodes and hand movement but she was under impression that it is her behavior.\par Spoke with the nurse on the phone . Explained that the VEEG done 10/2021 two seizures seen as screaming and axial movements. \par Pts Current meds: Keppra 500/750, Lamictal 125 BID, Zonisamide 100 mgs q hs which was introduced in March. Due to poor history that was provided by the group home unable to evaluate effectiveness. \par Blood work reviewed from 6/13/22. Keppra pending, Lamictal 8.3, Zonisamide 5.5\par \par Cristina can be aggressive towards self and others and is on a one to one sit at all times. \par \par At baseline patient needs assistance with all activities of daily living, she is incontinent. Patient is unable to ambulate and nonverbal. \par \par \par \par \par \par

## 2022-06-24 NOTE — DISCUSSION/SUMMARY
[FreeTextEntry1] : Cristina is a 32 year old nonverbal female from a group home with PMH of severe MR, congenital rubella and seizure disorder.\par \par Plan:\par - spoke with the nurse and educated her about the tpe of seizures Cristina has, advised that group home requests a copy of veeg to review it on video and to teach the rest of the staff\par - Continue same dose of Keppra 500 in am and 750 in pm\par - Continue Zonisamide 100 mgs Q HS and Lamictal 125 mgs bid\par - Rx given for new blood work to be done before next visit\par - Discussed importance of accurate seizure diary documentation\par - Follow up 2-3 months\par - REEG\par \par Case discussed with Dr. Orantes\par \par Paige Dominguez, DNP, ACNP-BC

## 2022-10-05 LAB
25(OH)D3 SERPL-MCNC: 41 NG/ML
ALBUMIN SERPL ELPH-MCNC: 4.3 G/DL
ALP BLD-CCNC: 70 U/L
ALT SERPL-CCNC: 36 U/L
ANION GAP SERPL CALC-SCNC: 10 MMOL/L
AST SERPL-CCNC: 17 U/L
BASOPHILS # BLD AUTO: 0.08 K/UL
BASOPHILS NFR BLD AUTO: 1.3 %
BILIRUB SERPL-MCNC: 0.2 MG/DL
BUN SERPL-MCNC: 10 MG/DL
CALCIUM SERPL-MCNC: 9.5 MG/DL
CHLORIDE SERPL-SCNC: 107 MMOL/L
CO2 SERPL-SCNC: 21 MMOL/L
CREAT SERPL-MCNC: 0.8 MG/DL
EGFR: 100 ML/MIN/1.73M2
EOSINOPHIL # BLD AUTO: 0.24 K/UL
EOSINOPHIL NFR BLD AUTO: 4 %
GLUCOSE SERPL-MCNC: 76 MG/DL
HCT VFR BLD CALC: 36.6 %
HGB BLD-MCNC: 11.7 G/DL
IMM GRANULOCYTES NFR BLD AUTO: 0.3 %
LYMPHOCYTES # BLD AUTO: 2.34 K/UL
LYMPHOCYTES NFR BLD AUTO: 38.6 %
MAN DIFF?: NORMAL
MCHC RBC-ENTMCNC: 27.2 PG
MCHC RBC-ENTMCNC: 32 G/DL
MCV RBC AUTO: 85.1 FL
MONOCYTES # BLD AUTO: 0.99 K/UL
MONOCYTES NFR BLD AUTO: 16.3 %
NEUTROPHILS # BLD AUTO: 2.39 K/UL
NEUTROPHILS NFR BLD AUTO: 39.5 %
PLATELET # BLD AUTO: 308 K/UL
POTASSIUM SERPL-SCNC: 5.3 MMOL/L
PROT SERPL-MCNC: 6.8 G/DL
RBC # BLD: 4.3 M/UL
RBC # FLD: 13.2 %
SODIUM SERPL-SCNC: 138 MMOL/L
WBC # FLD AUTO: 6.06 K/UL

## 2022-10-10 LAB
LAMOTRIGINE SERPL-MCNC: 8.1 UG/ML
LEVETIRACETAM SERPL-MCNC: 19.5 UG/ML
ZONISAMIDE SERPL-MCNC: 5.3 UG/ML

## 2022-10-13 ENCOUNTER — OUTPATIENT (OUTPATIENT)
Dept: OUTPATIENT SERVICES | Facility: HOSPITAL | Age: 32
LOS: 1 days | Discharge: HOME | End: 2022-10-13

## 2022-10-20 DIAGNOSIS — F79 UNSPECIFIED INTELLECTUAL DISABILITIES: ICD-10-CM

## 2022-10-21 ENCOUNTER — APPOINTMENT (OUTPATIENT)
Dept: NEUROLOGY | Facility: CLINIC | Age: 32
End: 2022-10-21

## 2022-10-21 PROCEDURE — 95816 EEG AWAKE AND DROWSY: CPT

## 2022-10-21 PROCEDURE — 99213 OFFICE O/P EST LOW 20 MIN: CPT

## 2022-10-25 NOTE — DISCUSSION/SUMMARY
[Safety Recommendations] : The patient was advised in regards to the risk of seizures and general seizure safety recommendations including not to be bathing alone, climbing to high places and operating heavy machinery. [Compliance with Medications] : The importance of compliance with medications was reinforced. [Bone Health Education] : Patient was educated in regards to bone health and an increased risk of osteoporosis in patients with epilepsy. [Sleep Hygiene/Sleep Disruption Risks] : Sleep hygiene and the risks of sleep disruption were discussed. [FreeTextEntry1] : Cristina is a 32 year old nonverbal female from a group home with PMH of severe MR, congenital rubella and seizure disorder.\par \par Plan:\par - Decrease Keppra to 500 in am and 500 in pm\par - Increase Zonisamide to 200 mgs Q HS and Lamictal 150 mgs bid\par - Rx given for new blood work to be done before next visit\par - Discussed importance of accurate seizure diary documentation\par - Follow up 2-3 months\par - educated group home member about Felisha seizures and what to look out for\par \par Case discussed with Dr. Orantes\par \par Paige Dominguez, DNP, ACNP-BC

## 2022-10-25 NOTE — PHYSICAL EXAM
[General Appearance - Alert] : alert [Agitated] : agitated [FreeTextEntry1] :  Neurologic Examination: Exam is limited. Pt aggressive does not want to be touched.\par General: The patient is nonverbal, does not follow directions.\par  Cerebellum: Nonambulatory

## 2022-10-25 NOTE — HISTORY OF PRESENT ILLNESS
[FreeTextEntry1] : Cristina is a 32 year old nonverbal female from a group home with PMH of severe MR, congenital rubella and seizure disorder presented for a follow up. \par Cristina is very agitated today. We attempted to do a REEG but would not complete it. The brief recording that was donse did not show any seizure activity.\par Once again the seizure diary shows no seizure but pt had a brief event in the exam room.\par Initially pt became quite and sleepy for 1-2 min and suddenly started screaming and flpping her arms and legs up and down whichh only lasted 10-15 seconds.  \par \par Pts Current meds: Keppra 500/750, Lamictal 125 BID, Zonisamide 100 mgs q hs \par \par Blood work reviewed from 10/2022. Keppra 19.5, Lamictal 8.1, Zonisamide 5.3\par \par  VEEG done 10/2021 two seizures seen as screaming and axial movements. \par \par \par Cristina can be aggressive towards self and others and is on a one to one sit at all times.  She goes to program but does not participate much. \par \par At baseline patient needs assistance with all activities of daily living, she is incontinent. Patient is unable to ambulate and nonverbal. \par \par \par \par \par \par

## 2022-11-02 ENCOUNTER — OUTPATIENT (OUTPATIENT)
Dept: OUTPATIENT SERVICES | Facility: HOSPITAL | Age: 32
LOS: 1 days | Discharge: HOME | End: 2022-11-02

## 2022-12-16 ENCOUNTER — RX RENEWAL (OUTPATIENT)
Age: 32
End: 2022-12-16

## 2023-01-04 ENCOUNTER — OUTPATIENT (OUTPATIENT)
Dept: OUTPATIENT SERVICES | Facility: HOSPITAL | Age: 33
LOS: 1 days | Discharge: HOME | End: 2023-01-04
Payer: MEDICAID

## 2023-01-04 DIAGNOSIS — R19.00 INTRA-ABDOMINAL AND PELVIC SWELLING, MASS AND LUMP, UNSPECIFIED SITE: ICD-10-CM

## 2023-01-04 PROCEDURE — 76856 US EXAM PELVIC COMPLETE: CPT | Mod: 26

## 2023-01-15 ENCOUNTER — EMERGENCY (EMERGENCY)
Facility: HOSPITAL | Age: 33
LOS: 0 days | Discharge: HOME | End: 2023-01-15
Attending: EMERGENCY MEDICINE | Admitting: EMERGENCY MEDICINE
Payer: MEDICAID

## 2023-01-15 VITALS
OXYGEN SATURATION: 97 % | HEART RATE: 63 BPM | RESPIRATION RATE: 18 BRPM | DIASTOLIC BLOOD PRESSURE: 66 MMHG | SYSTOLIC BLOOD PRESSURE: 100 MMHG | TEMPERATURE: 97 F

## 2023-01-15 VITALS — DIASTOLIC BLOOD PRESSURE: 52 MMHG | HEART RATE: 53 BPM | SYSTOLIC BLOOD PRESSURE: 90 MMHG

## 2023-01-15 DIAGNOSIS — Z86.69 PERSONAL HISTORY OF OTHER DISEASES OF THE NERVOUS SYSTEM AND SENSE ORGANS: ICD-10-CM

## 2023-01-15 DIAGNOSIS — Z20.822 CONTACT WITH AND (SUSPECTED) EXPOSURE TO COVID-19: ICD-10-CM

## 2023-01-15 DIAGNOSIS — F73 PROFOUND INTELLECTUAL DISABILITIES: ICD-10-CM

## 2023-01-15 DIAGNOSIS — R19.7 DIARRHEA, UNSPECIFIED: ICD-10-CM

## 2023-01-15 DIAGNOSIS — H54.7 UNSPECIFIED VISUAL LOSS: ICD-10-CM

## 2023-01-15 DIAGNOSIS — N85.8 OTHER SPECIFIED NONINFLAMMATORY DISORDERS OF UTERUS: ICD-10-CM

## 2023-01-15 DIAGNOSIS — N39.0 URINARY TRACT INFECTION, SITE NOT SPECIFIED: ICD-10-CM

## 2023-01-15 DIAGNOSIS — R11.2 NAUSEA WITH VOMITING, UNSPECIFIED: ICD-10-CM

## 2023-01-15 DIAGNOSIS — D25.9 LEIOMYOMA OF UTERUS, UNSPECIFIED: ICD-10-CM

## 2023-01-15 LAB
ALBUMIN SERPL ELPH-MCNC: 4.2 G/DL — SIGNIFICANT CHANGE UP (ref 3.5–5.2)
ALP SERPL-CCNC: 66 U/L — SIGNIFICANT CHANGE UP (ref 30–115)
ALT FLD-CCNC: 65 U/L — HIGH (ref 0–41)
ANION GAP SERPL CALC-SCNC: 14 MMOL/L — SIGNIFICANT CHANGE UP (ref 7–14)
APPEARANCE UR: CLEAR — SIGNIFICANT CHANGE UP
AST SERPL-CCNC: 29 U/L — SIGNIFICANT CHANGE UP (ref 0–41)
BACTERIA # UR AUTO: ABNORMAL
BASOPHILS # BLD AUTO: 0 K/UL — SIGNIFICANT CHANGE UP (ref 0–0.2)
BASOPHILS NFR BLD AUTO: 0 % — SIGNIFICANT CHANGE UP (ref 0–1)
BILIRUB SERPL-MCNC: 0.3 MG/DL — SIGNIFICANT CHANGE UP (ref 0.2–1.2)
BILIRUB UR-MCNC: NEGATIVE — SIGNIFICANT CHANGE UP
BUN SERPL-MCNC: 13 MG/DL — SIGNIFICANT CHANGE UP (ref 10–20)
CALCIUM SERPL-MCNC: 9.4 MG/DL — SIGNIFICANT CHANGE UP (ref 8.4–10.5)
CHLORIDE SERPL-SCNC: 107 MMOL/L — SIGNIFICANT CHANGE UP (ref 98–110)
CO2 SERPL-SCNC: 21 MMOL/L — SIGNIFICANT CHANGE UP (ref 17–32)
COLOR SPEC: SIGNIFICANT CHANGE UP
CREAT SERPL-MCNC: 0.7 MG/DL — SIGNIFICANT CHANGE UP (ref 0.7–1.5)
DIFF PNL FLD: ABNORMAL
EGFR: 118 ML/MIN/1.73M2 — SIGNIFICANT CHANGE UP
EOSINOPHIL # BLD AUTO: 0.11 K/UL — SIGNIFICANT CHANGE UP (ref 0–0.7)
EOSINOPHIL NFR BLD AUTO: 0.9 % — SIGNIFICANT CHANGE UP (ref 0–8)
EPI CELLS # UR: 9 /HPF — HIGH (ref 0–5)
FLUAV AG NPH QL: SIGNIFICANT CHANGE UP
FLUBV AG NPH QL: SIGNIFICANT CHANGE UP
GLUCOSE SERPL-MCNC: 83 MG/DL — SIGNIFICANT CHANGE UP (ref 70–99)
GLUCOSE UR QL: NEGATIVE — SIGNIFICANT CHANGE UP
HCG SERPL QL: NEGATIVE — SIGNIFICANT CHANGE UP
HCT VFR BLD CALC: 36.7 % — LOW (ref 37–47)
HGB BLD-MCNC: 11.9 G/DL — LOW (ref 12–16)
HYALINE CASTS # UR AUTO: 9 /LPF — HIGH (ref 0–7)
KETONES UR-MCNC: NEGATIVE — SIGNIFICANT CHANGE UP
LACTATE SERPL-SCNC: 1.2 MMOL/L — SIGNIFICANT CHANGE UP (ref 0.7–2)
LEUKOCYTE ESTERASE UR-ACNC: NEGATIVE — SIGNIFICANT CHANGE UP
LIDOCAIN IGE QN: 14 U/L — SIGNIFICANT CHANGE UP (ref 7–60)
LYMPHOCYTES # BLD AUTO: 1.7 K/UL — SIGNIFICANT CHANGE UP (ref 1.2–3.4)
LYMPHOCYTES # BLD AUTO: 13.9 % — LOW (ref 20.5–51.1)
MAGNESIUM SERPL-MCNC: 1.8 MG/DL — SIGNIFICANT CHANGE UP (ref 1.8–2.4)
MANUAL SMEAR VERIFICATION: SIGNIFICANT CHANGE UP
MCHC RBC-ENTMCNC: 27.7 PG — SIGNIFICANT CHANGE UP (ref 27–31)
MCHC RBC-ENTMCNC: 32.4 G/DL — SIGNIFICANT CHANGE UP (ref 32–37)
MCV RBC AUTO: 85.5 FL — SIGNIFICANT CHANGE UP (ref 81–99)
MONOCYTES # BLD AUTO: 2.23 K/UL — HIGH (ref 0.1–0.6)
MONOCYTES NFR BLD AUTO: 18.2 % — HIGH (ref 1.7–9.3)
NEUTROPHILS # BLD AUTO: 8.08 K/UL — HIGH (ref 1.4–6.5)
NEUTROPHILS NFR BLD AUTO: 65.2 % — SIGNIFICANT CHANGE UP (ref 42.2–75.2)
NEUTS BAND # BLD: 0.9 % — SIGNIFICANT CHANGE UP (ref 0–6)
NITRITE UR-MCNC: POSITIVE
NRBC # BLD: 1 /100 — HIGH (ref 0–0)
NRBC # BLD: SIGNIFICANT CHANGE UP /100 WBCS (ref 0–0)
PH UR: 6.5 — SIGNIFICANT CHANGE UP (ref 5–8)
PLAT MORPH BLD: NORMAL — SIGNIFICANT CHANGE UP
PLATELET # BLD AUTO: 224 K/UL — SIGNIFICANT CHANGE UP (ref 130–400)
POTASSIUM SERPL-MCNC: 3.5 MMOL/L — SIGNIFICANT CHANGE UP (ref 3.5–5)
POTASSIUM SERPL-SCNC: 3.5 MMOL/L — SIGNIFICANT CHANGE UP (ref 3.5–5)
PROT SERPL-MCNC: 7.1 G/DL — SIGNIFICANT CHANGE UP (ref 6–8)
PROT UR-MCNC: ABNORMAL
RBC # BLD: 4.29 M/UL — SIGNIFICANT CHANGE UP (ref 4.2–5.4)
RBC # FLD: 13.4 % — SIGNIFICANT CHANGE UP (ref 11.5–14.5)
RBC BLD AUTO: NORMAL — SIGNIFICANT CHANGE UP
RBC CASTS # UR COMP ASSIST: 4 /HPF — SIGNIFICANT CHANGE UP (ref 0–4)
RSV RNA NPH QL NAA+NON-PROBE: SIGNIFICANT CHANGE UP
SARS-COV-2 RNA SPEC QL NAA+PROBE: SIGNIFICANT CHANGE UP
SODIUM SERPL-SCNC: 142 MMOL/L — SIGNIFICANT CHANGE UP (ref 135–146)
SP GR SPEC: 1.01 — SIGNIFICANT CHANGE UP (ref 1.01–1.03)
UROBILINOGEN FLD QL: SIGNIFICANT CHANGE UP
VARIANT LYMPHS # BLD: 0.9 % — SIGNIFICANT CHANGE UP (ref 0–5)
WBC # BLD: 12.23 K/UL — HIGH (ref 4.8–10.8)
WBC # FLD AUTO: 12.23 K/UL — HIGH (ref 4.8–10.8)
WBC UR QL: 22 /HPF — HIGH (ref 0–5)

## 2023-01-15 PROCEDURE — 71045 X-RAY EXAM CHEST 1 VIEW: CPT | Mod: 26

## 2023-01-15 PROCEDURE — 76856 US EXAM PELVIC COMPLETE: CPT | Mod: 26

## 2023-01-15 PROCEDURE — 99282 EMERGENCY DEPT VISIT SF MDM: CPT

## 2023-01-15 PROCEDURE — 99285 EMERGENCY DEPT VISIT HI MDM: CPT

## 2023-01-15 PROCEDURE — 74177 CT ABD & PELVIS W/CONTRAST: CPT | Mod: 26,MA

## 2023-01-15 RX ORDER — CEFPODOXIME PROXETIL 100 MG
1 TABLET ORAL
Qty: 14 | Refills: 0
Start: 2023-01-15 | End: 2023-01-21

## 2023-01-15 RX ORDER — SODIUM CHLORIDE 9 MG/ML
1000 INJECTION, SOLUTION INTRAVENOUS ONCE
Refills: 0 | Status: COMPLETED | OUTPATIENT
Start: 2023-01-15 | End: 2023-01-15

## 2023-01-15 RX ORDER — ONDANSETRON 8 MG/1
4 TABLET, FILM COATED ORAL ONCE
Refills: 0 | Status: COMPLETED | OUTPATIENT
Start: 2023-01-15 | End: 2023-01-15

## 2023-01-15 RX ORDER — CEFTRIAXONE 500 MG/1
1000 INJECTION, POWDER, FOR SOLUTION INTRAMUSCULAR; INTRAVENOUS ONCE
Refills: 0 | Status: COMPLETED | OUTPATIENT
Start: 2023-01-15 | End: 2023-01-15

## 2023-01-15 RX ADMIN — ONDANSETRON 4 MILLIGRAM(S): 8 TABLET, FILM COATED ORAL at 08:36

## 2023-01-15 RX ADMIN — SODIUM CHLORIDE 1000 MILLILITER(S): 9 INJECTION, SOLUTION INTRAVENOUS at 10:31

## 2023-01-15 RX ADMIN — Medication 1 MILLIGRAM(S): at 09:47

## 2023-01-15 RX ADMIN — CEFTRIAXONE 100 MILLIGRAM(S): 500 INJECTION, POWDER, FOR SOLUTION INTRAMUSCULAR; INTRAVENOUS at 18:46

## 2023-01-15 NOTE — ED PROVIDER NOTE - NSFOLLOWUPCLINICS_GEN_ALL_ED_FT
[facility]  OB/GYN  [address] [place] [address]  Phone: [phone]  Fax:

## 2023-01-15 NOTE — ED PROVIDER NOTE - CLINICAL SUMMARY MEDICAL DECISION MAKING FREE TEXT BOX
Pt evaluated after sign out. Work up reviewed. Pt with pelvic mass. Evaluated by GYN, recommend outpt work up. Also found to have UTI- abx sent to the pharmacy. Will d/c.

## 2023-01-15 NOTE — CONSULT NOTE ADULT - ASSESSMENT
A/P: 33yo G0, nonverbal and blind, with known pelvic mass, likely fibroid, stable since 3/2022, and diarrhea    - final recs following sign out with attending A/P: 33yo G0, nonverbal and blind, with known pelvic mass, likely uterine fibroid, stable since 3/2022, presenting with emesis and diarrhea, likely infectious, unable to do physical exam, less likely gyn in origin, clinically and hemodynamically stable    - no acute gyn interventions at this time  - f/u with gyn outpatient  - dispo per ED    Dr. Pearl and Dr. Brenner aware

## 2023-01-15 NOTE — ED PROVIDER NOTE - PROGRESS NOTE DETAILS
pt signed out to Dr. Lane. pt pending ob/gyn evaluation. reassess. dispo pending Obgyn consulted will come and see patient

## 2023-01-15 NOTE — CONSULT NOTE ADULT - SUBJECTIVE AND OBJECTIVE BOX
BRIANNE PGY1    Chief Complaint: nausea and vomiting    HPI: 33yo G0 presents for nausea and diarrhea. She is blind and nonverbal and lives in a group home. Over night the patient had 7 or 8 episodes of foul smelling diarrhea and one episode of emesis. No patients with similar symptoms in her group home. Unable to obtain extensive history, but the nurse at the home reports that she has significant pain with her cycle that responds to Tylenol. No spotting between her periods and reports that her periods have not had increasing bleeding or pain. Now, the person at bedside (nonmedical, works at the home, not family) reports that she has not had any episodes of emesis or diarrhea since he got there at 1500. She has been sleeping comfortable and she did not want to wake up to eat when he tried to get her to.    Ob/Gyn History:  G0                 LMP -  unknown                 Cycle Length - regular  Denies known uterine fibroid seen on imaging today, stable in size from January. denies history of ovarian cysts, abnormal paps, or STIs  Last Pap Smear - unsure    Denies the following: constitutional symptoms, visual symptoms, cardiovascular symptoms, respiratory symptoms, GI symptoms, musculoskeletal symptoms, skin symptoms, neurologic symptoms, hematologic symptoms, allergic symptoms, psychiatric symptoms  Except any pertinent positives listed.     Home Medications:  acetaminophen 325 mg oral tablet: 2 tab(s) orally 3 times a day, As Needed (22 Dec 2021 09:25)  Ativan 1 mg oral tablet: 1 tab(s) orally 2 times a day (22 Dec 2021 09:25)  bisacodyl 10 mg rectal suppository: 1 suppository(ies) rectal every 48 hours, As Needed (22 Dec 2021 09:25)  ibuprofen 200 mg oral tablet: 2 tab(s) orally every 6 hours, As Needed (22 Dec 2021 09:)  Melatonin 5 mg oral tablet: 1 tab(s) orally once a day (at bedtime) (22 Dec 2021 09:)  Ocusoft topical soap: Apply topically to affected area 2 times a day (22 Dec 2021 09:25)  Systane ophthalmic solution: 1 drop(s) to each affected eye 3 times a day (22 Dec 2021 09:)  Vitamin D3 50 mcg (2000 intl units) oral tablet: 1 tab(s) orally once a day (22 Dec 2021 09:25)    No Known Allergies    PAST MEDICAL & SURGICAL HISTORY:  non verbal  Seizure  Congenital rubella  Blind  H/O cataract  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)  Foot drop, bilateral  No significant past surgical history    FAMILY HISTORY:  No pertinent family history in first degree relatives    SOCIAL HISTORY: Denies cigarette use, alcohol use, or illicit drug use    Vital Signs Last 24 Hrs  T(F): 96.7 (15 Mello 2023 16:30), Max: 96.9 (15 Mello 2023 06:52)  HR: 70 (15 Mello 2023 16:30) (63 - 70)  BP: 110/70 (15 Mello 2023 16:30) (100/66 - 110/70)  RR: 18 (15 Mello 2023 16:30) (18 - 18)    General Appearance - NAD  Abdomen - unable to obtain as patient is in fetal position  GYN/Pelvis: deferred    LABS:                        11.9   12.23 )-----------( 224      ( 15 Mello 2023 08:20 )             36.7     01-15    142  |  107  |  13  ----------------------------<  83  3.5   |  21  |  0.7    Ca    9.4      15 Mello 2023 08:20  Mg     1.8     01-15    TPro  7.1  /  Alb  4.2  /  TBili  0.3  /  DBili  x   /  AST  29  /  ALT  65<H>  /  AlkPhos  66  01-15      Urinalysis Basic - ( 15 Mello 2023 13:55 )    Color: Light Yellow / Appearance: Clear / S.010 / pH: x  Gluc: x / Ketone: Negative  / Bili: Negative / Urobili: <2 mg/dL   Blood: x / Protein: 30 mg/dL / Nitrite: Positive   Leuk Esterase: Negative / RBC: 4 /HPF / WBC 22 /HPF   Sq Epi: x / Non Sq Epi: 9 /HPF / Bacteria: Few    RADIOLOGY & ADDITIONAL STUDIES:  < from: US Pelvis Complete (22 @ 15:35) >  ACC: [id number] EXAM:  US PELVIC COMPLETE                          PROCEDURE DATE:  2022          INTERPRETATION:  CLINICAL INFORMATION: Right adnexal mass    LMP: 2022    COMPARISON: 7/10/2021 and abdominal pelvic CT dated 7/10/2021    TECHNIQUE:  Transabdominal ultrasound the pelvis was performed. Study was performed   with the patient in a wheelchair    FINDINGS:    Uterus: 5.8 cm x 3.7 cm x 5.4 cm. Within normal limits.  Endometrium: 14 mm. Within normal limits.    Right adnexal solid mass measuring 6.2 x 4.1 x 4.0 cm, incompletely   characterized on this examination    The left ovary is not definitively seen.    Fluid: None.    IMPRESSION:    Again seen is an indeterminate right adnexal solid mass measuring up to   6.2 cm, incompletely characterized on this examination.    Further evaluation with pelvic MRI with gadolinium is recommended    Spoke with JOHNNY SUN MD               on 3/16/2022 3:36 PM with   readback.    --- End of Report ---    ZULMA STEVENS MD; Attending Radiologist  This document has been electronically signed. Mar 16 2022  3:38PM    < end of copied text >    < from: US Pelvis Complete (01.15.23 @ 15:02) >  ACC: [id number] EXAM:  US PELVIC COMPLETE                          PROCEDURE DATE:  01/15/2023          INTERPRETATION:  CLINICAL INFORMATION: Pelvic pain.    LMP: Beginning of December.    COMPARISON: Pelvic sonogram 2023    TECHNIQUE:  Transabdominal pelvic sonogram only. Color and Spectral Doppler was   performed.    FINDINGS:    Uterus: 6.4 cm x 4.3 cm x 4.8 cm. heterogeneous structure abutting the   uterine fundus ensuring approximately 5.7 cm, better characterized on   concurrent CT.  Endometrium: 6 mm.    Right ovary: 3.7 cm x 2.7 cm x 4.1 cm. Volume of 21 mL. Color Doppler   flow is demonstrated.  Left ovary: Not visualized.    Fluid: None.    IMPRESSION:    Heterogeneous pelvic mass abutting the uterine fundus , better   characterized on concurrent CT. Possibilities to consider are   pedunculated uterine fibroid,, ovarian torsion and ovarian neoplasm.    Nonvisualization of left ovary.    Questionable debris within the urinary bladder. Correlation with   urinalysis is suggested.        --- End of Report ---      < end of copied text >  < from: CT Abdomen and Pelvis w/ IV Cont (01.15.23 @ 10:06) >  ACC: [id number] EXAM:  CT ABDOMEN AND PELVIS IC                          PROCEDURE DATE:  01/15/2023          INTERPRETATION:  CLINICAL STATEMENT: Diarrhea and vomiting.    TECHNIQUE: Contiguous axial CT images were obtained from the lower chest   tothe pubic symphysis following administration of 100cc Omnipaque 350   intravenous contrast.  Oral contrast .  Reformatted images in the coronal   and sagittal planes were acquired.    COMPARISON: CT abdomen pelvis 7/10/2021    FINDINGS:    Images aredegraded by streak artifact.    LOWER CHEST: Bibasilar subsegmental atelectasis, left greater than right.    HEPATOBILIARY: Contracted gallbladder.    SPLEEN: Unremarkable.    PANCREAS: Unremarkable.    ADRENAL GLANDS: Unremarkable.    KIDNEYS: Symmetric renal enhancement. No hydronephrosis.    ABDOMINOPELVIC NODES: Unremarkable.    PELVIC ORGANS: Increased size indeterminate 9.0 x 6.1 x 6.0 cm complex   pelvic mass, which is inseparable from the anterior aspect of the uterine   fundus (4/286), previously 5.4 x 4.7 x 6.1 cm. Urinary bladder is   decompressed, limiting further evaluation.    PERITONEUM/MESENTERY/BOWEL: No evidence of bowel obstruction or free air.   Appendix is not definitely identified.    BONES/SOFT TISSUES: No acute osseousabnormality.      IMPRESSION:    1. Since July 10, 2021, increased size indeterminate 9.0 cm complex   pelvic mass, which is inseparable from the anterior aspect of the uterine   fundus; if further evaluation is clinically warranted, contrast-enhanced   pelvic MRI may be of use.    2. Otherwise, no definite evidence of acute/inflammatory process within   the abdomen or pelvis.    --- End of Report ---            < end of copied text >  < from: CT Abdomen and Pelvis w/ IV Cont (01.15.23 @ 10:06) >  ACC: [id number] EXAM:  CT ABDOMEN AND PELVIS IC                          PROCEDURE DATE:  01/15/2023          INTERPRETATION:  CLINICAL STATEMENT: Diarrhea and vomiting.    TECHNIQUE: Contiguous axial CT images were obtained from the lower chest   tothe pubic symphysis following administration of 100cc Omnipaque 350   intravenous contrast.  Oral contrast .  Reformatted images in the coronal   and sagittal planes were acquired.    COMPARISON: CT abdomen pelvis 7/10/2021    FINDINGS:    Images aredegraded by streak artifact.    LOWER CHEST: Bibasilar subsegmental atelectasis, left greater than right.    HEPATOBILIARY: Contracted gallbladder.    SPLEEN: Unremarkable.    PANCREAS: Unremarkable.    ADRENAL GLANDS: Unremarkable.    KIDNEYS: Symmetric renal enhancement. No hydronephrosis.    ABDOMINOPELVIC NODES: Unremarkable.    PELVIC ORGANS: Increased size indeterminate 9.0 x 6.1 x 6.0 cm complex   pelvic mass, which is inseparable from the anterior aspect of the uterine   fundus (4/286), previously 5.4 x 4.7 x 6.1 cm. Urinary bladder is   decompressed, limiting further evaluation.    PERITONEUM/MESENTERY/BOWEL: No evidence of bowel obstruction or free air.   Appendix is not definitely identified.    BONES/SOFT TISSUES: No acute osseousabnormality.      IMPRESSION:    1. Since July 10, 2021, increased size indeterminate 9.0 cm complex   pelvic mass, which is inseparable from the anterior aspect of the uterine   fundus; if further evaluation is clinically warranted, contrast-enhanced   pelvic MRI may be of use.    2. Otherwise, no definite evidence of acute/inflammatory process within   the abdomen or pelvis.    --- End of Report ---            < end of copied text >   BRIANNE PGY1    Chief Complaint: nausea and vomiting    HPI: 31yo G0 presents for nausea and diarrhea. She is blind and nonverbal and lives in a group home. Over night the patient had 7 or 8 episodes of foul smelling diarrhea and one episode of emesis. No patients with similar symptoms in her group home. Unable to obtain extensive history, but the nurse at the home reports that she has significant pain with her cycle that responds to Tylenol. No spotting between her periods and reports that her periods have not had increasing bleeding or pain. Now, the person at bedside (nonmedical, works at the home, not family) reports that she has not had any episodes of emesis or diarrhea since he got there at 1500. She has been sleeping comfortable and she did not want to wake up to eat when he tried to get her to.    Ob/Gyn History:  G0                 LMP -  unknown                 Cycle Length - regular  Denies known uterine fibroid seen on imaging today, stable in size from January. denies history of ovarian cysts, abnormal paps, or STIs  Last Pap Smear - unsure    Denies the following: constitutional symptoms, visual symptoms, cardiovascular symptoms, respiratory symptoms, GI symptoms, musculoskeletal symptoms, skin symptoms, neurologic symptoms, hematologic symptoms, allergic symptoms, psychiatric symptoms  Except any pertinent positives listed.     Home Medications:  acetaminophen 325 mg oral tablet: 2 tab(s) orally 3 times a day, As Needed (22 Dec 2021 09:25)  Ativan 1 mg oral tablet: 1 tab(s) orally 2 times a day (22 Dec 2021 09:25)  bisacodyl 10 mg rectal suppository: 1 suppository(ies) rectal every 48 hours, As Needed (22 Dec 2021 09:25)  ibuprofen 200 mg oral tablet: 2 tab(s) orally every 6 hours, As Needed (22 Dec 2021 09:)  Melatonin 5 mg oral tablet: 1 tab(s) orally once a day (at bedtime) (22 Dec 2021 09:)  Ocusoft topical soap: Apply topically to affected area 2 times a day (22 Dec 2021 09:25)  Systane ophthalmic solution: 1 drop(s) to each affected eye 3 times a day (22 Dec 2021 09:)  Vitamin D3 50 mcg (2000 intl units) oral tablet: 1 tab(s) orally once a day (22 Dec 2021 09:25)    No Known Allergies    PAST MEDICAL & SURGICAL HISTORY:  non verbal  Seizure  Congenital rubella  Blind  H/O cataract  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)  Foot drop, bilateral  No significant past surgical history    FAMILY HISTORY:  No pertinent family history in first degree relatives    SOCIAL HISTORY: Denies cigarette use, alcohol use, or illicit drug use    Vital Signs Last 24 Hrs  T(F): 96.7 (15 Mello 2023 16:30), Max: 96.9 (15 Mello 2023 06:52)  HR: 70 (15 Mello 2023 16:30) (63 - 70)  BP: 110/70 (15 Mello 2023 16:30) (100/66 - 110/70)  RR: 18 (15 Mello 2023 16:30) (18 - 18)    General Appearance - NAD  Abdomen - unable to obtain as patient is in fetal position  GYN/Pelvis: deferred (attempted to contact health care proxy for consent on pelvic exam, unsuccessful)    LABS:                        11.9   12.23 )-----------( 224      ( 15 Mello 2023 08:20 )             36.7     01-15    142  |  107  |  13  ----------------------------<  83  3.5   |  21  |  0.7    Ca    9.4      15 Mello 2023 08:20  Mg     1.8     01-15    TPro  7.1  /  Alb  4.2  /  TBili  0.3  /  DBili  x   /  AST  29  /  ALT  65<H>  /  AlkPhos  66  01-15      Urinalysis Basic - ( 15 Mello 2023 13:55 )    Color: Light Yellow / Appearance: Clear / S.010 / pH: x  Gluc: x / Ketone: Negative  / Bili: Negative / Urobili: <2 mg/dL   Blood: x / Protein: 30 mg/dL / Nitrite: Positive   Leuk Esterase: Negative / RBC: 4 /HPF / WBC 22 /HPF   Sq Epi: x / Non Sq Epi: 9 /HPF / Bacteria: Few    RADIOLOGY & ADDITIONAL STUDIES:  < from: US Pelvis Complete (22 @ 15:35) >  ACC: [id number] EXAM:  US PELVIC COMPLETE                          PROCEDURE DATE:  2022          INTERPRETATION:  CLINICAL INFORMATION: Right adnexal mass    LMP: 2022    COMPARISON: 7/10/2021 and abdominal pelvic CT dated 7/10/2021    TECHNIQUE:  Transabdominal ultrasound the pelvis was performed. Study was performed   with the patient in a wheelchair    FINDINGS:    Uterus: 5.8 cm x 3.7 cm x 5.4 cm. Within normal limits.  Endometrium: 14 mm. Within normal limits.    Right adnexal solid mass measuring 6.2 x 4.1 x 4.0 cm, incompletely   characterized on this examination    The left ovary is not definitively seen.    Fluid: None.    IMPRESSION:    Again seen is an indeterminate right adnexal solid mass measuring up to   6.2 cm, incompletely characterized on this examination.    Further evaluation with pelvic MRI with gadolinium is recommended    Spoke with JOHNNY SUN MD               on 3/16/2022 3:36 PM with   readback.    --- End of Report ---    ZULMA STEVENS MD; Attending Radiologist  This document has been electronically signed. Mar 16 2022  3:38PM    < end of copied text >    < from: US Pelvis Complete (01.15.23 @ 15:02) >  ACC: [id number] EXAM:  US PELVIC COMPLETE                          PROCEDURE DATE:  01/15/2023          INTERPRETATION:  CLINICAL INFORMATION: Pelvic pain.    LMP: Beginning of December.    COMPARISON: Pelvic sonogram 2023    TECHNIQUE:  Transabdominal pelvic sonogram only. Color and Spectral Doppler was   performed.    FINDINGS:    Uterus: 6.4 cm x 4.3 cm x 4.8 cm. heterogeneous structure abutting the   uterine fundus ensuring approximately 5.7 cm, better characterized on   concurrent CT.  Endometrium: 6 mm.    Right ovary: 3.7 cm x 2.7 cm x 4.1 cm. Volume of 21 mL. Color Doppler   flow is demonstrated.  Left ovary: Not visualized.    Fluid: None.    IMPRESSION:    Heterogeneous pelvic mass abutting the uterine fundus , better   characterized on concurrent CT. Possibilities to consider are   pedunculated uterine fibroid,, ovarian torsion and ovarian neoplasm.    Nonvisualization of left ovary.    Questionable debris within the urinary bladder. Correlation with   urinalysis is suggested.        --- End of Report ---      < end of copied text >  < from: CT Abdomen and Pelvis w/ IV Cont (01.15.23 @ 10:06) >  ACC: [id number] EXAM:  CT ABDOMEN AND PELVIS IC                          PROCEDURE DATE:  01/15/2023          INTERPRETATION:  CLINICAL STATEMENT: Diarrhea and vomiting.    TECHNIQUE: Contiguous axial CT images were obtained from the lower chest   tothe pubic symphysis following administration of 100cc Omnipaque 350   intravenous contrast.  Oral contrast .  Reformatted images in the coronal   and sagittal planes were acquired.    COMPARISON: CT abdomen pelvis 7/10/2021    FINDINGS:    Images aredegraded by streak artifact.    LOWER CHEST: Bibasilar subsegmental atelectasis, left greater than right.    HEPATOBILIARY: Contracted gallbladder.    SPLEEN: Unremarkable.    PANCREAS: Unremarkable.    ADRENAL GLANDS: Unremarkable.    KIDNEYS: Symmetric renal enhancement. No hydronephrosis.    ABDOMINOPELVIC NODES: Unremarkable.    PELVIC ORGANS: Increased size indeterminate 9.0 x 6.1 x 6.0 cm complex   pelvic mass, which is inseparable from the anterior aspect of the uterine   fundus (4/286), previously 5.4 x 4.7 x 6.1 cm. Urinary bladder is   decompressed, limiting further evaluation.    PERITONEUM/MESENTERY/BOWEL: No evidence of bowel obstruction or free air.   Appendix is not definitely identified.    BONES/SOFT TISSUES: No acute osseousabnormality.      IMPRESSION:    1. Since July 10, 2021, increased size indeterminate 9.0 cm complex   pelvic mass, which is inseparable from the anterior aspect of the uterine   fundus; if further evaluation is clinically warranted, contrast-enhanced   pelvic MRI may be of use.    2. Otherwise, no definite evidence of acute/inflammatory process within   the abdomen or pelvis.    --- End of Report ---            < end of copied text >  < from: CT Abdomen and Pelvis w/ IV Cont (01.15.23 @ 10:06) >  ACC: [id number] EXAM:  CT ABDOMEN AND PELVIS IC                          PROCEDURE DATE:  01/15/2023          INTERPRETATION:  CLINICAL STATEMENT: Diarrhea and vomiting.    TECHNIQUE: Contiguous axial CT images were obtained from the lower chest   tothe pubic symphysis following administration of 100cc Omnipaque 350   intravenous contrast.  Oral contrast .  Reformatted images in the coronal   and sagittal planes were acquired.    COMPARISON: CT abdomen pelvis 7/10/2021    FINDINGS:    Images aredegraded by streak artifact.    LOWER CHEST: Bibasilar subsegmental atelectasis, left greater than right.    HEPATOBILIARY: Contracted gallbladder.    SPLEEN: Unremarkable.    PANCREAS: Unremarkable.    ADRENAL GLANDS: Unremarkable.    KIDNEYS: Symmetric renal enhancement. No hydronephrosis.    ABDOMINOPELVIC NODES: Unremarkable.    PELVIC ORGANS: Increased size indeterminate 9.0 x 6.1 x 6.0 cm complex   pelvic mass, which is inseparable from the anterior aspect of the uterine   fundus (4/286), previously 5.4 x 4.7 x 6.1 cm. Urinary bladder is   decompressed, limiting further evaluation.    PERITONEUM/MESENTERY/BOWEL: No evidence of bowel obstruction or free air.   Appendix is not definitely identified.    BONES/SOFT TISSUES: No acute osseousabnormality.      IMPRESSION:    1. Since July 10, 2021, increased size indeterminate 9.0 cm complex   pelvic mass, which is inseparable from the anterior aspect of the uterine   fundus; if further evaluation is clinically warranted, contrast-enhanced   pelvic MRI may be of use.    2. Otherwise, no definite evidence of acute/inflammatory process within   the abdomen or pelvis.    --- End of Report ---            < end of copied text >

## 2023-01-15 NOTE — ED PROVIDER NOTE - OBJECTIVE STATEMENT
33 yo F with pmhx of developmental delay, seizures presenting for nausea, vomiting, and diarrhea since yesterday. I am unable to obtain a comprehensive history, review of systems, past medical history, and/or physical exam due to constraints imposed by the urgency of the patient's clinical condition and/or mental status.

## 2023-01-15 NOTE — ED PROVIDER NOTE - PHYSICAL EXAMINATION
VITAL SIGNS: I have reviewed nursing notes and confirm.  CONSTITUTIONAL: Patient is in no acute distress.  SKIN: Skin exam is warm and dry, no acute rash.  HEAD: Normocephalic; atraumatic.  EYES: PERRL, EOM intact; conjunctiva and sclera clear.  ENT: No nasal discharge; airway clear.   NECK: Supple; non tender.  CARD: S1, S2 normal; no murmurs, gallops, or rubs. Regular rate and rhythm.  RESP: Clear to auscultation bilaterally. No wheezes, rales or rhonchi.  ABD: Normal bowel sounds; soft; non-distended; non-tender.   EXT: Normal ROM. No edema.  LYMPH: No acute cervical adenopathy.  NEURO: Awake and alert. Grossly unremarkable. No focal deficits.  PSYCH: Cooperative, appropriate.

## 2023-01-15 NOTE — ED ADULT NURSE REASSESSMENT NOTE - CONDITION
Jabier   
unchanged

## 2023-01-15 NOTE — ED PROVIDER NOTE - PATIENT PORTAL LINK FT
You can access the FollowMyHealth Patient Portal offered by [facility] by registering at the following website: [email or web address]. By joining [facility]’s FollowMyHealth portal, you will also be able to view your health information using other applications (apps) compatible with our system.

## 2023-01-15 NOTE — CONSULT NOTE ADULT - ATTENDING COMMENTS
31 y/o with nausea, vomiting, and diarrhea, with known fibroid uterus, here for evaluation of GI symptoms. Fibroid uterus unlikely to be contributing to current symptoms. Recommend further workup per ED. Follow up gyn outpatient, no acute interventions needed at this time.

## 2023-01-15 NOTE — ED PROVIDER NOTE - ATTENDING CONTRIBUTION TO CARE
32-year-old female with a past medical history significant for profound developmental disorder seizure who presents with nausea vomiting diarrhea.  Patient is nonverbal medical information was provided by aide who is at bedside.  As per aide there have been multiple COVID infections in the facility.  Patient has been having nonbilious nonbloody vomiting and diarrhea.  However patient has not been having any fevers.    VITAL SIGNS: I have reviewed nursing notes and confirm.  CONSTITUTIONAL: non-toxic, well appearing  SKIN: no rash, no petechiae.  EYES: EOMI, pink conjunctiva, anicteric  ENT: tongue midline, no exudates, MMM  NECK: Supple; no meningismus, no JVD  CARD: RRR, no murmurs, equal radial pulses bilaterally 2+  RESP: CTAB, no respiratory distress  ABD: Soft, non-tender, non-distended, no peritoneal signs, no HSM, no CVA tenderness     32 yr old f that presents with n/v/d. labs, ua, imaging, ivf. reassess. dispo pending.

## 2023-01-25 ENCOUNTER — APPOINTMENT (OUTPATIENT)
Dept: NEUROLOGY | Facility: CLINIC | Age: 33
End: 2023-01-25
Payer: MEDICAID

## 2023-01-25 PROCEDURE — 99213 OFFICE O/P EST LOW 20 MIN: CPT

## 2023-01-30 NOTE — HISTORY OF PRESENT ILLNESS
[FreeTextEntry1] : Cristina is a 32 year old nonverbal female from a group home with PMH of severe MR, congenital rubella and seizure disorder presented for a follow up. \par Cristina is in he usual state agitated. \par Since last visit she had 2 brief documented seizures. about 45 seconds each. One in November and one in December.\par \par Pts Current meds: Keppra 500/500, Lamictal 105 BID, Zonisamide 200 mgs q hs \par \par Blood work reviewed from 1/18/23. Keppra 10.8, Lamictal 9.6, Zonisamide 8.2\par \par \par Cristina can be aggressive towards self and others and is on a one to one sit at all times.  She goes to program but does not participate much. \par \par At baseline patient needs assistance with all activities of daily living, she is incontinent. Patient is unable to ambulate and nonverbal. \par \par \par \par \par \par

## 2023-01-30 NOTE — DISCUSSION/SUMMARY
[Safety Recommendations] : The patient was advised in regards to the risk of seizures and general seizure safety recommendations including not to be bathing alone, climbing to high places and operating heavy machinery. [Compliance with Medications] : The importance of compliance with medications was reinforced. [Bone Health Education] : Patient was educated in regards to bone health and an increased risk of osteoporosis in patients with epilepsy. [Sleep Hygiene/Sleep Disruption Risks] : Sleep hygiene and the risks of sleep disruption were discussed. [FreeTextEntry1] : Cristina is a 32 year old nonverbal female from a group home with PMH of severe MR, congenital rubella and seizure disorder.\par \par Plan:\par -Continue to decrease Keppra to 375 mgs BID\par - Increase Zonisamide to 300 mgs Q HS and Lamictal 175 mgs bid\par - Rx given for new blood work to be done before next visit\par - Discussed importance of accurate seizure diary documentation\par - Follow up 3-4 months\par \par Case discussed with Dr. Orantes\par \par Paige Dominguez, DNP, ACNP-BC

## 2023-03-02 ENCOUNTER — OUTPATIENT (OUTPATIENT)
Dept: OUTPATIENT SERVICES | Facility: HOSPITAL | Age: 33
LOS: 1 days | End: 2023-03-02
Payer: MEDICAID

## 2023-03-02 DIAGNOSIS — F73 PROFOUND INTELLECTUAL DISABILITIES: ICD-10-CM

## 2023-03-02 PROCEDURE — 97542 WHEELCHAIR MNGMENT TRAINING: CPT | Mod: GO

## 2023-03-03 DIAGNOSIS — F73 PROFOUND INTELLECTUAL DISABILITIES: ICD-10-CM

## 2023-04-12 ENCOUNTER — RX RENEWAL (OUTPATIENT)
Age: 33
End: 2023-04-12

## 2023-04-20 ENCOUNTER — OUTPATIENT (OUTPATIENT)
Dept: OUTPATIENT SERVICES | Facility: HOSPITAL | Age: 33
LOS: 1 days | End: 2023-04-20
Payer: MEDICAID

## 2023-04-20 VITALS — RESPIRATION RATE: 14 BRPM | WEIGHT: 115.74 LBS | HEIGHT: 62 IN

## 2023-04-20 DIAGNOSIS — Z00.8 ENCOUNTER FOR OTHER GENERAL EXAMINATION: ICD-10-CM

## 2023-04-20 DIAGNOSIS — Z01.818 ENCOUNTER FOR OTHER PREPROCEDURAL EXAMINATION: ICD-10-CM

## 2023-04-20 PROCEDURE — 99214 OFFICE O/P EST MOD 30 MIN: CPT | Mod: 25

## 2023-04-20 NOTE — H&P PST ADULT - HISTORY OF PRESENT ILLNESS
PT UN ABLE TO WALK UP 2-3 FLIGHTS OF STEPS WITH NO SOB. PT IS WHEELCHAIR BOUND.   INFO OBTAINED FROM RN- EBONY CROCKER  --AWARE P.E.-- VERY LIMITED - AWARE WE ARE UNABLE TO OBTAIN EKG OR BLOOD WORK.   NURSE MANAGER- [place] AWARE - WE ARE UNABLE TO OBTAIN EKG & BLOOD WORK- AT TIME OF VISIT.

## 2023-04-20 NOTE — H&P PST ADULT - MENTAL STATUS
PT IS NON VERBAL -SEVERE MR  EXAM LIMITED PT- ANXIOUS & COMBATIVE.   MEDICATION LIST - PROVIDED BY RN- EBONY.

## 2023-04-20 NOTE — H&P PST ADULT - REASON FOR ADMISSION
Proceduralist: Kel Machado  Procedure: MRI ABDOMEN AND PELVIS NO CONTRAST  Procedure: 60 Minutes  Anesthesia Type: General  PT IS COMBATIVE AT TIMES DURING EXAM.   PT PUSHING NP  AND MEDICAL ASSISTANT AWAY.  PT MOVING HANDS FORCEFULLY TOWARDS NP AND MEDICAL ASSISTANT.   RN FROM [facility]- EBONY INFORMED - WE ARE UNABLE TO OBTAIN BLOOD WORK AND EKG.   PE --VERY LIMITED --

## 2023-04-21 DIAGNOSIS — Z00.8 ENCOUNTER FOR OTHER GENERAL EXAMINATION: ICD-10-CM

## 2023-04-21 DIAGNOSIS — Z01.818 ENCOUNTER FOR OTHER PREPROCEDURAL EXAMINATION: ICD-10-CM

## 2023-05-01 ENCOUNTER — APPOINTMENT (OUTPATIENT)
Dept: NEUROLOGY | Facility: CLINIC | Age: 33
End: 2023-05-01
Payer: MEDICAID

## 2023-05-01 VITALS
BODY MASS INDEX: 25.76 KG/M2 | OXYGEN SATURATION: 100 % | WEIGHT: 140 LBS | HEIGHT: 62 IN | DIASTOLIC BLOOD PRESSURE: 70 MMHG | SYSTOLIC BLOOD PRESSURE: 130 MMHG | TEMPERATURE: 97.6 F | HEART RATE: 67 BPM

## 2023-05-01 PROCEDURE — 99213 OFFICE O/P EST LOW 20 MIN: CPT

## 2023-05-01 RX ORDER — LAMOTRIGINE 25 MG/1
25 TABLET ORAL
Qty: 180 | Refills: 1 | Status: DISCONTINUED | COMMUNITY
Start: 2022-03-02 | End: 2023-05-01

## 2023-05-03 NOTE — DISCUSSION/SUMMARY
[Safety Recommendations] : The patient was advised in regards to the risk of seizures and general seizure safety recommendations including not to be bathing alone, climbing to high places and operating heavy machinery. [Compliance with Medications] : The importance of compliance with medications was reinforced. [Bone Health Education] : Patient was educated in regards to bone health and an increased risk of osteoporosis in patients with epilepsy. [Sleep Hygiene/Sleep Disruption Risks] : Sleep hygiene and the risks of sleep disruption were discussed. [FreeTextEntry1] : Cristina is a 33 year old nonverbal female from a group home with PMH of severe MR, congenital rubella and seizure disorder.\par \par Plan:\par -Continue to decrease Keppra(titration schedule provided)\par - Add 50 mgs of Zonisamide in am  continue Zonisamide 300 mgs Q HS \par - Increase  Lamictal to 200 mgs BID\par - may need psych consult for behavior\par - Rx given for new blood work to be done before next visit\par - Discussed importance of accurate seizure diary documentation\par - Follow up 3-4 months\par \par Case discussed with Dr. Orantes\par \par Paige Dominguez, DNP, ACNP-BC

## 2023-05-03 NOTE — HISTORY OF PRESENT ILLNESS
[FreeTextEntry1] : Cristina is a 33 year old nonverbal female from a group home with PMH of severe MR, congenital rubella and seizure disorder presented for a follow up. \par Cristina is more agitated todays than usual. The aide is attributing it to her menstrual cycle.\par  \par No reported seizure.\par \par Pts Current meds: Keppra 375/375, Lamictal 175 BID, Zonisamide 300 mgs q hs \par \par Blood work reviewed from last week. Keppra 5.2, Lamictal 7.2, Zonisamide 16.3\par \par Cristina can be aggressive towards self and others and is on a one to one sit at all times.  She goes to program but does not participate much. \par \par At baseline patient needs assistance with all activities of daily living, she is incontinent. Patient is unable to ambulate and nonverbal. \par \par \par \par \par \par

## 2023-05-24 ENCOUNTER — OUTPATIENT (OUTPATIENT)
Dept: OUTPATIENT SERVICES | Facility: HOSPITAL | Age: 33
LOS: 1 days | End: 2023-05-24
Payer: MEDICAID

## 2023-05-24 VITALS
SYSTOLIC BLOOD PRESSURE: 116 MMHG | DIASTOLIC BLOOD PRESSURE: 59 MMHG | HEIGHT: 62 IN | WEIGHT: 139.99 LBS | TEMPERATURE: 98 F | RESPIRATION RATE: 20 BRPM

## 2023-05-24 DIAGNOSIS — R10.0 ACUTE ABDOMEN: ICD-10-CM

## 2023-05-24 DIAGNOSIS — Z01.818 ENCOUNTER FOR OTHER PREPROCEDURAL EXAMINATION: ICD-10-CM

## 2023-05-24 PROCEDURE — 99214 OFFICE O/P EST MOD 30 MIN: CPT | Mod: 25

## 2023-05-24 RX ORDER — ACETAMINOPHEN 500 MG
2 TABLET ORAL
Qty: 0 | Refills: 0 | DISCHARGE

## 2023-05-24 RX ORDER — LAMOTRIGINE 25 MG/1
3 TABLET, ORALLY DISINTEGRATING ORAL
Refills: 0 | DISCHARGE

## 2023-05-24 RX ORDER — LANOLIN/MINERAL OIL
1 LOTION (ML) TOPICAL
Qty: 0 | Refills: 0 | DISCHARGE

## 2023-05-24 RX ORDER — IBUPROFEN 200 MG
2 TABLET ORAL
Qty: 0 | Refills: 0 | DISCHARGE

## 2023-05-24 RX ORDER — ZONISAMIDE 100 MG
3 CAPSULE ORAL
Refills: 0 | DISCHARGE

## 2023-05-24 RX ORDER — LANOLIN ALCOHOL/MO/W.PET/CERES
1 CREAM (GRAM) TOPICAL
Qty: 0 | Refills: 0 | DISCHARGE

## 2023-05-24 RX ORDER — TRIHEXYPHENIDYL HCL 2 MG
1 TABLET ORAL
Refills: 0 | DISCHARGE

## 2023-05-24 RX ORDER — CHOLECALCIFEROL (VITAMIN D3) 125 MCG
1 CAPSULE ORAL
Qty: 0 | Refills: 0 | DISCHARGE

## 2023-05-24 NOTE — H&P PST ADULT - HISTORY OF PRESENT ILLNESS
Pt presents in wheelchair non verbal. Limited physical exam due to pt agitation.     AS PER PATIENT  this is his/her complete medical history including medications - PRESCRIPTIONS  OVER THE COUNTER MEDS    pt denies any covid s/s, or tested positive in the past.  Received covid vaccine  pt advised self quarantine till day of procedure    Anesthesia Alert UNABLE TO ASSESS  NO--Difficult Airway  NO--History of neck surgery or radiation  NO--Limited ROM of neck  NO--History of Malignant hyperthermia  NO--No personal or family history of Pseudocholinesterase deficiency.  NO--Prior Anesthesia Complication  NO--Latex Allergy  NO--Loose teeth  NO--History of Rheumatoid Arthritis  NO--PURVI  NO--Bleeding risk  NO--Other_____    Patient verbalized understanding of instructions and was given the opportunity to ask questions and have them answered.

## 2023-05-24 NOTE — H&P PST ADULT - REASON FOR ADMISSION
34 Y/O F with severe MR, seizure disorder (per RN at group home last seizure over 1 yr ago), blind/non verbal, SCHEDULED FOR PAST FOR  MRI ABDOMEN AND PELVIS WITH CONTRAST UNDER GA WITH DR JAMESON.  Pt was seen in ED for n/v/d was seen by gyn for known right adnexal mass. Now for scheduled MRI.

## 2023-05-25 DIAGNOSIS — Z01.818 ENCOUNTER FOR OTHER PREPROCEDURAL EXAMINATION: ICD-10-CM

## 2023-05-25 DIAGNOSIS — R10.0 ACUTE ABDOMEN: ICD-10-CM

## 2023-05-31 ENCOUNTER — OUTPATIENT (OUTPATIENT)
Dept: OUTPATIENT SERVICES | Facility: HOSPITAL | Age: 33
LOS: 1 days | End: 2023-05-31
Payer: MEDICAID

## 2023-05-31 VITALS
HEART RATE: 83 BPM | RESPIRATION RATE: 18 BRPM | OXYGEN SATURATION: 99 % | SYSTOLIC BLOOD PRESSURE: 96 MMHG | DIASTOLIC BLOOD PRESSURE: 64 MMHG | TEMPERATURE: 97 F

## 2023-05-31 VITALS
DIASTOLIC BLOOD PRESSURE: 61 MMHG | HEART RATE: 64 BPM | RESPIRATION RATE: 18 BRPM | HEIGHT: 62 IN | WEIGHT: 139.99 LBS | TEMPERATURE: 100 F | OXYGEN SATURATION: 99 % | SYSTOLIC BLOOD PRESSURE: 111 MMHG

## 2023-05-31 DIAGNOSIS — Z00.8 ENCOUNTER FOR OTHER GENERAL EXAMINATION: ICD-10-CM

## 2023-05-31 DIAGNOSIS — R10.2 PELVIC AND PERINEAL PAIN: ICD-10-CM

## 2023-05-31 PROCEDURE — A9579: CPT

## 2023-05-31 PROCEDURE — 72196 MRI PELVIS W/DYE: CPT

## 2023-05-31 PROCEDURE — 72196 MRI PELVIS W/DYE: CPT | Mod: 26

## 2023-05-31 NOTE — ASU PATIENT PROFILE, ADULT - FALL HARM RISK - RISK INTERVENTIONS

## 2023-06-01 DIAGNOSIS — R10.2 PELVIC AND PERINEAL PAIN: ICD-10-CM

## 2023-06-09 NOTE — ED ADULT NURSE NOTE - NSIMPLEMENTINTERV_GEN_ALL_ED
Implemented All Fall with Harm Risk Interventions:  [place] to call system. Call bell, personal items and telephone within reach. Instruct patient to call for assistance. Room bathroom lighting operational. Non-slip footwear when patient is off stretcher. Physically safe environment: no spills, clutter or unnecessary equipment. Stretcher in lowest position, wheels locked, appropriate side rails in place. Provide visual cue, wrist band, yellow gown, etc. Monitor gait and stability. Monitor for mental status changes and reorient to person, place, and time. Review medications for side effects contributing to fall risk. Reinforce activity limits and safety measures with patient and family. Provide visual clues: red socks. 55.3

## 2023-06-21 NOTE — ED ADULT NURSE NOTE - NS_SISCREENINGSR_GEN_ALL_ED
Negative Paramedian Forehead Flap Text: A decision was made to reconstruct the defect utilizing an interpolation axial flap and a staged reconstruction.  A telfa template was made of the defect.  This telfa template was then used to outline the paramedian forehead pedicle flap.  The donor area for the pedicle flap was then injected with anesthesia.  The flap was excised through the skin and subcutaneous tissue down to the layer of the underlying musculature.  The pedicle flap was carefully excised within this deep plane to maintain its blood supply.  The edges of the donor site were undermined.   The donor site was closed in a primary fashion.  The pedicle was then rotated into position and sutured.  Once the tube was sutured into place, adequate blood supply was confirmed with blanching and refill.  The pedicle was then wrapped with xeroform gauze and dressed appropriately with a telfa and gauze bandage to ensure continued blood supply and protect the attached pedicle.

## 2023-06-30 NOTE — DISCHARGE NOTE PROVIDER - EXTENDED VTE YES NO FOR MLM ENOXAPARIN
06/30/2023   --Chart accessed for:  --Care Gaps addressed:  Care Everywhere updated  Pt due for foot exam, A1c, eye exam, low dose statin, and colonoscopy. Pt can be scheduled for mammogram due or it coming up in August. Discuss care gaps at upcoming appt   Health Maintenance Due   Topic Date Due    COVID-19 Vaccine (1) Never done    Pneumococcal Vaccines (Age 0-64) (1 - PCV) Never done    Eye Exam  Never done    TETANUS VACCINE  Never done    Low Dose Statin  Never done    Colorectal Cancer Screening  Never done    Shingles Vaccine (1 of 2) Never done    Foot Exam  04/13/2023    Hemoglobin A1c  06/30/2023    Mammogram  08/19/2023     
,

## 2023-07-08 ENCOUNTER — RX RENEWAL (OUTPATIENT)
Age: 33
End: 2023-07-08

## 2023-07-12 NOTE — PHYSICAL EXAM
General Appearance - Alert] : alert [General Appearance - In No Acute Distress] : in no acute distress [FreeTextEntry5] : moves all 4 extremities 5/5

## 2023-08-06 ENCOUNTER — EMERGENCY (EMERGENCY)
Facility: HOSPITAL | Age: 33
LOS: 0 days | Discharge: HOME | End: 2023-08-06
Attending: EMERGENCY MEDICINE
Payer: MEDICAID

## 2023-08-06 VITALS
SYSTOLIC BLOOD PRESSURE: 138 MMHG | DIASTOLIC BLOOD PRESSURE: 73 MMHG | OXYGEN SATURATION: 100 % | WEIGHT: 117.51 LBS | TEMPERATURE: 99 F | RESPIRATION RATE: 18 BRPM | HEART RATE: 67 BPM

## 2023-08-06 VITALS
DIASTOLIC BLOOD PRESSURE: 60 MMHG | SYSTOLIC BLOOD PRESSURE: 113 MMHG | HEART RATE: 59 BPM | RESPIRATION RATE: 18 BRPM | TEMPERATURE: 98 F | OXYGEN SATURATION: 98 %

## 2023-08-06 DIAGNOSIS — F98.4 STEREOTYPED MOVEMENT DISORDERS: ICD-10-CM

## 2023-08-06 DIAGNOSIS — M21.371 FOOT DROP, RIGHT FOOT: ICD-10-CM

## 2023-08-06 DIAGNOSIS — S01.511A LACERATION WITHOUT FOREIGN BODY OF LIP, INITIAL ENCOUNTER: ICD-10-CM

## 2023-08-06 DIAGNOSIS — F79 UNSPECIFIED INTELLECTUAL DISABILITIES: ICD-10-CM

## 2023-08-06 DIAGNOSIS — G40.909 EPILEPSY, UNSPECIFIED, NOT INTRACTABLE, WITHOUT STATUS EPILEPTICUS: ICD-10-CM
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
## 2023-08-06 DIAGNOSIS — M21.372 FOOT DROP, LEFT FOOT: ICD-10-CM
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
## 2023-08-06 DIAGNOSIS — H54.7 UNSPECIFIED VISUAL LOSS: ICD-10-CM

## 2023-08-06 DIAGNOSIS — Z86.19 PERSONAL HISTORY OF OTHER INFECTIOUS AND PARASITIC DISEASES: ICD-10-CM

## 2023-08-06 PROCEDURE — 99283 EMERGENCY DEPT VISIT LOW MDM: CPT

## 2023-08-06 RX ORDER — ACETAMINOPHEN 500 MG
650 TABLET ORAL ONCE
Refills: 0 | Status: COMPLETED | OUTPATIENT
Start: 2023-08-06 | End: 2023-08-06

## 2023-08-06 RX ADMIN — Medication 650 MILLIGRAM(S): at 17:37

## 2023-08-06 NOTE — ED PROVIDER NOTE - NSFOLLOWUPINSTRUCTIONS_ED_ALL_ED_FT
Please follow up with your primary care physician within 24-72 hours and return immediately if symptoms worsen.    Laceration    WHAT YOU NEED TO KNOW:    A laceration is an injury to the skin and the soft tissue underneath it. Lacerations happen when you are cut or hit by something. They can happen anywhere on the body.     DISCHARGE INSTRUCTIONS:    Return to the emergency department if:     You have heavy bleeding or bleeding that does not stop after 10 minutes of holding firm, direct pressure over the wound.       Your wound opens up.     Contact your healthcare provider if:     You have a fever or chills.       Your laceration is red, warm, or swollen.      You have red streaks on your skin coming from your wound.      You have white or yellow drainage from the wound that smells bad.      You have pain that gets worse, even after treatment.       You have questions or concerns about your condition or care.     Medicines:     Prescription pain medicine may be given. Ask how to take this medicine safely.       Antibiotics help treat or prevent a bacterial infection.       Take your medicine as directed. Contact your healthcare provider if you think your medicine is not helping or if you have side effects. Tell him or her if you are allergic to any medicine. Keep a list of the medicines, vitamins, and herbs you take. Include the amounts, and when and why you take them. Bring the list or the pill bottles to follow-up visits. Carry your medicine list with you in case of an emergency.    Care for your wound as directed:     Do not get your wound wet until your healthcare provider says it is okay. Do not soak your wound in water. Do not go swimming until your healthcare provider says it is okay. Carefully wash the wound with soap and water. Gently pat the area dry or allow it to air dry.       Change your bandages when they get wet, dirty, or after washing. Apply new, clean bandages as directed. Do not apply elastic bandages or tape too tight. Do not put powders or lotions over your incision.       Apply antibiotic ointment as directed. Your healthcare provider may give you antibiotic ointment to put over your wound if you have stitches. If you have strips of tape over your incision, let them dry up and fall off on their own. If they do not fall off within 14 days, gently remove them. If you have glue over your wound, do not remove or pick at it. If your glue comes off, do not replace it with glue that you have at home.       Check your wound every day for signs of infection such as swelling, redness, or pus.     Self-care:     Apply ice on your wound for 15 to 20 minutes every hour or as directed. Use an ice pack, or put crushed ice in a plastic bag. Cover it with a towel. Ice helps prevent tissue damage and decreases swelling and pain.      Use a splint as directed. A splint will decrease movement and stress on your wound. It may help it heal faster. A splint may be used for lacerations over joints or areas of your body that bend. Ask your healthcare provider how to apply and remove a splint.       Decrease scarring of your wound by applying ointments as directed. Do not apply ointments until your healthcare provider says it is okay. You may need to wait until your wound is healed. Ask which ointment to buy and how often to use it. After your wound is healed, use sunscreen over the area when you are out in the sun. You should do this for at least 6 months to 1 year after your injury.     Follow up with your healthcare provider as directed: You may need to follow up in 24 to 48 hours to have your wound checked for infection. You will need to return in 3 to 14 days if you have stitches or staples so they can be removed. Care for your wound as directed to prevent infection and help it heal. Write down your questions so you remember to ask them during your visits.       © Copyright [employer] 2019 All illustrations and images included in CareNotes are the copyrighted property of [employer] or [employer]. Mustarde Flap Text: The defect edges were debeveled with a #15 scalpel blade.  Given the size, depth and location of the defect and the proximity to free margins a Mustarde flap was deemed most appropriate. Using a sterile surgical marker, an appropriate flap was drawn incorporating the defect. The area thus outlined was incised with a #15 scalpel blade. The skin margins were undermined to an appropriate distance in all directions utilizing iris scissors. Following this, the designed flap was placed in the primary defect and sutured into place.

## 2023-08-06 NOTE — ED ADULT NURSE NOTE - NS ED NURSE RECORD ANOTHER VITAL SIGN
Subjective findings: The patient return to clinic today for postop visit #3, 3 weeks status post amputation third toe right foot.  The patient is in good spirits.  Had no complaints.  The patient also complained of a painful second toe right foot.     Objective findings: The dressings were removed and wound margins well healed. There is no edema, erythema, cellulitis, drainage or bleeding noted.  Neurovascular status is intact.  Vital signs are stable.  Minimal drainage noted.       Assessment: Osteomyelitis       Plan: I informed the patient that he is well healed and may now begin to gradually return to normal activities and normal shoe gear.  He is to return to the clinic as needed.        
Yes

## 2023-08-06 NOTE — ED ADULT NURSE NOTE - NSFALLHARMRISKINTERV_ED_ALL_ED

## 2023-08-06 NOTE — ED ADULT NURSE NOTE - OBJECTIVE STATEMENT
Patient BIBA [facility] c/o left upper lip swelling. EMS reports patient with 1 episode of vomiting. No vomiting during ED stay. Pt has mental retardation

## 2023-08-06 NOTE — ED PROVIDER NOTE - PATIENT PORTAL LINK FT
You can access the FollowMyHealth Patient Portal offered by [facility] by registering at the following website: [email or web address]. By joining [facility]’s FollowMyHealth portal, you will also be able to view your health information using other applications (apps) compatible with our system.

## 2023-08-06 NOTE — ED PROVIDER NOTE - PHYSICAL EXAMINATION
Gen: NAD  Head: NCAT  HEENT: PERRL, oral mucosa moist, normal conjunctiva, oropharynx clear without exudate or erythema  Lung: CTAB, no respiratory distress, no wheezing, rales, rhonchi  CV: normal s1/s2, rrr, Normal perfusion, pulses 2+ throughout  Abd: soft, NTND, no CVA tenderness  MSK: No edema, no visible deformities, full range of motion in all 4 extremities  Neuro: No focal neurologic deficits  Skin: No rash, 1cm superficial laceration to L upper inner lip   Psych: normal affect

## 2023-08-06 NOTE — ED PROVIDER NOTE - ATTENDING APP SHARED VISIT CONTRIBUTION OF CARE
33yF congenital rubella, blindness, seizures on keppra, lamictal p/w lip laceration sustained overnight.  Pt with hx similar lip trauma, likely 2/2 biting vs hitting her lips, less likely seizures.  Pt receives all her meds from staff, without concern for any missed doses.  Pt awake/alert/at her baseline on ED assessment.

## 2023-08-06 NOTE — ED PROVIDER NOTE - CLINICAL SUMMARY MEDICAL DECISION MAKING FREE TEXT BOX
33yF congenital rubella, blindness, seizures p/w lip injury either from seizure or trauma.  Pt at her baseline w/o any missed meds or new neuro deficits.  Lip wound cleaned and trimmed, but no need for primary repair.  Recommend supportive care, o/p PCP f/u, return precautions.

## 2023-08-06 NOTE — ED PROVIDER NOTE - OBJECTIVE STATEMENT
34 yo female with a pmh of congential rubella, seizures, and intellectual disability presents from nursing facility for a lip injury. pt is nonverbal at baseline. pt accompanied by aide that states pt hit her face while slipping and this morning they noted a injury to her L upper lip. pt in no acute distress. no fevers, cough, n/v/d.

## 2023-08-07 ENCOUNTER — RX RENEWAL (OUTPATIENT)
Age: 33
End: 2023-08-07

## 2023-08-31 ENCOUNTER — OUTPATIENT (OUTPATIENT)
Dept: OUTPATIENT SERVICES | Facility: HOSPITAL | Age: 33
LOS: 1 days | End: 2023-08-31
Payer: MEDICAID

## 2023-08-31 DIAGNOSIS — F73 PROFOUND INTELLECTUAL DISABILITIES: ICD-10-CM

## 2023-08-31 PROCEDURE — 97542 WHEELCHAIR MNGMENT TRAINING: CPT | Mod: GO

## 2023-09-01 DIAGNOSIS — F73 PROFOUND INTELLECTUAL DISABILITIES: ICD-10-CM

## 2023-09-06 ENCOUNTER — APPOINTMENT (OUTPATIENT)
Dept: NEUROLOGY | Facility: CLINIC | Age: 33
End: 2023-09-06

## 2023-10-18 NOTE — DISCHARGE NOTE NURSING/CASE MANAGEMENT/SOCIAL WORK - WILL THE PATIENT ACCEPT THE PFIZER COVID-19 VACCINE IF ELIGIBLE AND IT IS AVAILABLE?
Medicated PT per [facility], allergies verified, will continue to monitor Physical Therapy Visit    Visit Type: Daily Treatment Note  Visit: 3  Referring Provider: Keisha Serna PA-C  Medical Diagnosis (from order): Diagnosis Information    Diagnosis  V58.89, 840.9 (ICD-9-CM) - S46.011D (ICD-10-CM) - Strain of muscle(s) and tendon(s) of the rotator cuff of right shoulder, subsequent encounter  726.10 (ICD-9-CM) - M75.51 (ICD-10-CM) - Bursitis of right shoulder         SUBJECTIVE                                                                                                               Pt reports a 3/10 pain.  She reports that she is doing yard work and at the end of the day her shoulder hurts.  She reports that she did not do her Home Exercise Program this morning d/t the pain.  She reports that sliding up the wall w/ a towel was too much on her shoulder so she did not do it.    Pain / Symptoms  - Pain rating (out of 10): Current: 3       OBJECTIVE                                                                                                                     Range of Motion (ROM)   (degrees unless noted; active unless noted; norms in ( ); negative=lacking to 0, positive=beyond 0)  Shoulder:    - Flexion (180):        • Right: 156 pain    - Abduction (180):        • Right: 135 pain                       Treatment     Therapeutic Exercise  Seated Shoulder Flexion to 90 3x10  Seated Shoulder Flexion to 90 1# x10  Seated Shoulder Abduction to 90 1# 3x10  Shoulder External Rotation Red Theraband 3x10  Scapular Retraction 3x10 hold for 3 seconds      Skilled input: verbal instruction/cues and demonstration    Writer verbally educated and received verbal consent for hand placement, positioning of patient, and techniques to be performed today from patient for clothing adjustments for techniques, hand placement and palpation for techniques and therapist position for techniques as described above and how they are pertinent to the patient's plan of care.  Home Exercise Program  Access  Code: [email or web address]  URL: [email or web address]  Date: 06/28/2023  Prepared by: Karlos Canales    Exercises  - Seated Shoulder Abduction Towel Slide at Table Top  - 2 x daily - 5-7 x weekly - 3 sets - 10 reps - 3 hold  - Shoulder Flexion Wall Slide with Towel  - 2 x daily - 5-7 x weekly - 3 sets - 10 reps - 3 hold  - Seated Single Arm Shoulder Flexion with Dumbbells  - 2 x daily - 5-7 x weekly - 3 sets - 10 reps - 1 hold  - Seated Single Arm Shoulder Abduction with Dumbbell - Thumb Up  - 2 x daily - 5-7 x weekly - 3 sets - 10 reps - 1 hold  - Shoulder External Rotation with Anchored Resistance  - 2 x daily - 5-7 x weekly - 3 sets - 10 reps - 1 hold    Patient Education  - Ice      ASSESSMENT                                                                                                            Pt is progressing in shoulder ROM compared to her previous measurements. Pt tolerated performing shoulder Abduction slide on table compared to up the wall, but was able to perform shoulder Flexion up the wall.  Switching from a towel to a pillow case seemed to improve tolerance.  She is also improving in shoulder strength by adding external resistance.  At home the pt was advised to hold canned food or her remote to simulate resistance. She was also shown how to perform her Red Theraband in her door at home. Pt continues to benefit from skilled PT in order to address current impairments and the Home Exercise Program was updated to address those impairments.  Education:   - Results of above outlined education: Verbalizes understanding and Demonstrates understanding    PLAN                                                                                                                           Suggestions for next session as indicated: Progress per plan of care       Therapy procedure time and total treatment time can be found documented on the Time Entry flowsheet     PROCEDURES:  Insertion, UroLift implant, prostate, transurethral 18-Oct-2023 08:16:25  Casey Carrera   Not applicable

## 2023-11-22 ENCOUNTER — OUTPATIENT (OUTPATIENT)
Dept: OUTPATIENT SERVICES | Facility: HOSPITAL | Age: 33
LOS: 1 days | End: 2023-11-22
Payer: MEDICAID

## 2023-11-22 DIAGNOSIS — R10.2 PELVIC AND PERINEAL PAIN: ICD-10-CM

## 2023-11-22 DIAGNOSIS — Z00.8 ENCOUNTER FOR OTHER GENERAL EXAMINATION: ICD-10-CM

## 2023-11-22 PROCEDURE — 76856 US EXAM PELVIC COMPLETE: CPT

## 2023-11-22 PROCEDURE — 76856 US EXAM PELVIC COMPLETE: CPT | Mod: 26

## 2023-11-23 DIAGNOSIS — R10.2 PELVIC AND PERINEAL PAIN: ICD-10-CM

## 2023-12-06 ENCOUNTER — APPOINTMENT (OUTPATIENT)
Dept: NEUROLOGY | Facility: CLINIC | Age: 33
End: 2023-12-06
Payer: MEDICAID

## 2023-12-06 ENCOUNTER — APPOINTMENT (OUTPATIENT)
Dept: NEUROLOGY | Facility: CLINIC | Age: 33
End: 2023-12-06

## 2023-12-06 PROCEDURE — 99213 OFFICE O/P EST LOW 20 MIN: CPT

## 2023-12-06 RX ORDER — LEVETIRACETAM 250 MG/1
250 TABLET, FILM COATED ORAL
Qty: 80 | Refills: 0 | Status: DISCONTINUED | COMMUNITY
Start: 2022-06-15 | End: 2023-12-06

## 2023-12-27 ENCOUNTER — APPOINTMENT (OUTPATIENT)
Dept: OTOLARYNGOLOGY | Facility: CLINIC | Age: 33
End: 2023-12-27
Payer: MEDICAID

## 2023-12-27 DIAGNOSIS — H61.23 IMPACTED CERUMEN, BILATERAL: ICD-10-CM

## 2023-12-27 DIAGNOSIS — H93.8X3 OTHER SPECIFIED DISORDERS OF EAR, BILATERAL: ICD-10-CM

## 2023-12-27 PROCEDURE — 69210 REMOVE IMPACTED EAR WAX UNI: CPT

## 2023-12-27 NOTE — HISTORY OF PRESENT ILLNESS
[de-identified] : Patient presents today c/o right ear bleeding on 12/2/2023,  bilateral clogged ears.  Documentation from group home indicated that patient was bleeding on 12/2/2023 from right ear.

## 2023-12-27 NOTE — REASON FOR VISIT
[Initial Evaluation] : an initial evaluation for [FreeTextEntry2] : right ear bleeding on 12/2/2023,  bilateral clogged ears

## 2023-12-27 NOTE — PHYSICAL EXAM
[Midline] : trachea located in midline position [Normal] : tympanic membranes are normal in both ears [de-identified] : cerumen impaction in both ears remove with sucition

## 2024-01-31 NOTE — H&P PST ADULT - ATTENDING PHYSICIAN (PRINT):______________________________ DATE:_______ TIME:_______
IR paracentesis completed, removed  3950 mL, sent specimen to lab as requested, applied bandaid to puncture site, VS stable, room air, denies pain, transported pt back to ED 15, report given to Carolann SNYDER of ED, IR care complete  
Statement Selected

## 2024-04-25 ENCOUNTER — OUTPATIENT (OUTPATIENT)
Dept: OUTPATIENT SERVICES | Facility: HOSPITAL | Age: 34
LOS: 1 days | End: 2024-04-25
Payer: MEDICAID

## 2024-04-25 DIAGNOSIS — R10.2 PELVIC AND PERINEAL PAIN: ICD-10-CM

## 2024-04-25 DIAGNOSIS — Z00.8 ENCOUNTER FOR OTHER GENERAL EXAMINATION: ICD-10-CM

## 2024-04-25 PROCEDURE — 76856 US EXAM PELVIC COMPLETE: CPT

## 2024-04-25 PROCEDURE — 76856 US EXAM PELVIC COMPLETE: CPT | Mod: 26

## 2024-04-26 DIAGNOSIS — R10.2 PELVIC AND PERINEAL PAIN: ICD-10-CM

## 2024-05-20 RX ORDER — ZONISAMIDE 50 MG/1
50 CAPSULE ORAL
Qty: 30 | Refills: 2 | Status: ACTIVE | COMMUNITY
Start: 2023-05-01 | End: 1900-01-01

## 2024-05-20 RX ORDER — LAMOTRIGINE 200 MG/1
200 TABLET ORAL
Qty: 60 | Refills: 2 | Status: ACTIVE | COMMUNITY
Start: 2022-05-31 | End: 1900-01-01

## 2024-05-20 RX ORDER — ZONISAMIDE 100 MG/1
100 CAPSULE ORAL
Qty: 90 | Refills: 4 | Status: ACTIVE | COMMUNITY
Start: 2022-03-02 | End: 1900-01-01

## 2024-05-21 LAB
ALBUMIN SERPL ELPH-MCNC: 4.1 G/DL
ALP BLD-CCNC: 66 U/L
ALT SERPL-CCNC: 76 U/L
ANION GAP SERPL CALC-SCNC: 11 MMOL/L
AST SERPL-CCNC: 22 U/L
BILIRUB SERPL-MCNC: 0.3 MG/DL
BUN SERPL-MCNC: 13 MG/DL
CALCIUM SERPL-MCNC: 9.4 MG/DL
CHLORIDE SERPL-SCNC: 109 MMOL/L
CO2 SERPL-SCNC: 20 MMOL/L
CREAT SERPL-MCNC: 0.7 MG/DL
EGFR: 116 ML/MIN/1.73M2
GLUCOSE SERPL-MCNC: 86 MG/DL
HCT VFR BLD CALC: 37.1 %
HGB BLD-MCNC: 11.5 G/DL
MAGNESIUM SERPL-MCNC: 1.9 MG/DL
MCHC RBC-ENTMCNC: 27.3 PG
MCHC RBC-ENTMCNC: 31 G/DL
MCV RBC AUTO: 88.1 FL
PLATELET # BLD AUTO: 276 K/UL
PMV BLD AUTO: 0 /100 WBCS
PMV BLD: 10.2 FL
POTASSIUM SERPL-SCNC: 4.1 MMOL/L
PROT SERPL-MCNC: 6.7 G/DL
RBC # BLD: 4.21 M/UL
RBC # FLD: 13.4 %
SODIUM SERPL-SCNC: 140 MMOL/L
WBC # FLD AUTO: 10.38 K/UL

## 2024-05-28 LAB
LAMOTRIGINE SERPL-MCNC: 12.8 UG/ML
ZONISAMIDE SERPL-MCNC: 16.8 UG/ML

## 2024-06-10 ENCOUNTER — APPOINTMENT (OUTPATIENT)
Dept: NEUROLOGY | Facility: CLINIC | Age: 34
End: 2024-06-10
Payer: MEDICAID

## 2024-06-10 DIAGNOSIS — R56.9 UNSPECIFIED CONVULSIONS: ICD-10-CM

## 2024-06-10 PROCEDURE — 99213 OFFICE O/P EST LOW 20 MIN: CPT

## 2024-06-12 NOTE — HISTORY OF PRESENT ILLNESS
[FreeTextEntry1] : Cristina is a 34 year old nonverbal female from a group home with PMH of severe MR, congenital rubella and seizure disorder presented for a follow up.  She did not do her EEG this morning as scheduled. Pt was late for the appt.  Since last visit no reported seizures or hospitalizations.  Blood work reviewed from May 2024 Map 1.9, Zonisamide 16.8, Lamictal 12.8 No changes in behavior . Still aggressive towards self and others and is on a one to one sit at all times.  She goes to program but does not participate much.   At baseline patient needs assistance with all activities of daily living, she is incontinent. Patient is unable to ambulate and nonverbal.

## 2024-06-12 NOTE — DISCUSSION/SUMMARY
[Safety Recommendations] : The patient was advised in regards to the risk of seizures and general seizure safety recommendations including not to be bathing alone, climbing to high places and operating heavy machinery. [Compliance with Medications] : The importance of compliance with medications was reinforced. [Bone Health Education] : Patient was educated in regards to bone health and an increased risk of osteoporosis in patients with epilepsy. [Sleep Hygiene/Sleep Disruption Risks] : Sleep hygiene and the risks of sleep disruption were discussed. [FreeTextEntry1] : Cristina is a 34 year old nonverbal female from a group home with PMH of severe MR, congenital rubella and seizure disorder.  Plan: -Continue 50 mgs of Zonisamide in am and Zonisamide 300 mgs Q HS  -Continue Lamictal 200 mgs BID - Rx given for new blood work to be done before next visit - Discussed importance of accurate seizure diary documentation - Follow up in 6 months - will attempt REEG - start Mag 250 mgs PO daily  Case discussed with Dr. Lamberto Dominguez, DNP, ACNP-BC

## 2024-06-12 NOTE — PHYSICAL EXAM
[General Appearance - Alert] : alert [Agitated] : agitated [FreeTextEntry1] : Neurologic Examination: Exam is limited. Pt aggressive does not want to be touched. General: The patient is nonverbal, does not follow directions.  Cerebellum: Nonambulatory in a wheelchair.

## 2024-06-20 ENCOUNTER — APPOINTMENT (OUTPATIENT)
Dept: OTOLARYNGOLOGY | Facility: CLINIC | Age: 34
End: 2024-06-20
Payer: MEDICAID

## 2024-06-20 DIAGNOSIS — H93.8X3 OTHER SPECIFIED DISORDERS OF EAR, BILATERAL: ICD-10-CM

## 2024-06-20 PROCEDURE — 99213 OFFICE O/P EST LOW 20 MIN: CPT

## 2024-06-20 NOTE — HISTORY OF PRESENT ILLNESS
[FreeTextEntry1] : Patient returns today c/o clogged ears. Accompanied by aide. Here today for ear cleaning.

## 2024-08-05 ENCOUNTER — RX RENEWAL (OUTPATIENT)
Age: 34
End: 2024-08-05

## 2024-10-23 NOTE — SWALLOW BEDSIDE ASSESSMENT ADULT - COMMENTS
Chief Complaint  Nodular sclerosis Hodgkin lymphoma of lymph nodes of multip    Oli Kennedy MD Mester, Denise GRIGGS, APRN    Subjective          Walter EDWARDS [place] presents to [facility] for follow-up of his Hodgkin's lymphoma.  He is status post treatments as outlined below.  He states he is feeling well.  He denies fever, chills, else for night sweats or adenopathy.  He notes his energy level still is not quite as good as he would hope.  He is also having some neuropathy in his feet.  He notes good appetite.  He denies any issues from his Port-A-Cath.    Oncology/Hematology History   Nodular sclerosis Hodgkin lymphoma of lymph nodes of axilla (Resolved)   12/5/2023 Initial Diagnosis    Nodular sclerosis Hodgkin lymphoma of lymph nodes of axilla     Nodular sclerosis Hodgkin lymphoma of lymph nodes of multiple regions   12/5/2023 Initial Diagnosis    Nodular sclerosis Hodgkin lymphoma of lymph nodes of multiple regions     12/5/2023 Cancer Staged    Staging form: Hodgkin And Non-Hodgkin Lymphoma, AJCC 8th Edition  - Clinical: Stage IV - Signed by Boy Lock MD on 12/5/2023 12/14/2023 - 5/17/2024 Chemotherapy    OP HODGKIN LYMPHOMA  BV+AVD (Brentuximab vedotin / Doxorubicin / Vinblastine / Dacarbazine)         Review of Systems  Current Outpatient Medications on File Prior to Visit   Medication Sig Dispense Refill    Alcohol Swabs 70 % pads USE PRIOR TO INSULIN INJECTIONS BEFORE A MEAL AND AT BEDTIME      apixaban (ELIQUIS) 5 MG tablet tablet Take 1 tablet by mouth 2 (Two) Times a Day. 60 tablet 5    atorvastatin (LIPITOR) 80 MG tablet Take 1 tablet by mouth Every Night.      B-D ULTRAFINE III SHORT PEN 31G X 8 MM misc USE WITH INSULIN PEN PER SLIDING SCALE DIRECTIONS FOUR TIMES DAILY      diphenoxylate-atropine (LOMOTIL) 2.5-0.025 MG per tablet Take 1 tablet by mouth 4 (Four) Times a Day As Needed for Diarrhea. 60 tablet 0    Farxiga 10 MG tablet Take 10 mg by 
mouth Daily.      hydroCHLOROthiazide 12.5 MG tablet TAKE 1 TABLET BY MOUTH DAILY 30 tablet 0    HYDROcodone-acetaminophen (Norco) 5-325 MG per tablet Take 1 tablet by mouth Every 6 (Six) Hours As Needed for Moderate Pain or Severe Pain. 20 tablet 0    Insulin Aspart FlexPen 100 UNIT/ML solution pen-injector INJECT UNDER THE SKIN PER LOW DOSE SLIDING SCALE. MAX DOSE OF 60 UNITS PER DAY      lisinopril (PRINIVIL,ZESTRIL) 30 MG tablet Take 1 tablet by mouth Daily.      loratadine (CLARITIN) 10 MG tablet Take 1 tablet by mouth Daily As Needed.      metFORMIN (GLUCOPHAGE) 1000 MG tablet Take 1 tablet by mouth 2 (Two) Times a Day With Meals. Take no later than 6:00 PM the night before surgery.      OLANZapine (ZyPREXA) 5 MG tablet Take 1 tablet by mouth Every Night. Take on days on Days 1, 2, 3, and 4, and on days 15, 16, 17, and 18 after Chemotherapy. 8 tablet 2    omeprazole (prilOSEC) 10 MG capsule Take 1 capsule by mouth Daily. 30 capsule 2    ondansetron (ZOFRAN) 8 MG tablet Take 1 tablet by mouth 3 (Three) Times a Day As Needed for Nausea or Vomiting. 30 tablet 5    prochlorperazine (COMPAZINE) 10 MG tablet Take 1 tablet by mouth Every 8 (Eight) Hours As Needed for Nausea or Vomiting. 60 tablet 1    Rybelsus 14 MG tablet       tamsulosin (FLOMAX) 0.4 MG capsule 24 hr capsule Take 1 capsule by mouth Daily for 360 days. 90 capsule 3     No current facility-administered medications on file prior to visit.       No Known Allergies  Past Medical History:   Diagnosis Date    Allergies     Anemia 12/5/2023    Diabetes mellitus     Does not check BS    Hyperlipidemia     Hypertension     Nodular sclerosis Hodgkin lymphoma of lymph nodes of axilla 12/5/2023    Sleep apnea     uses CPAP    Thyroid nodule 11/06/23     Past Surgical History:   Procedure Laterality Date    BACK SURGERY      discectomy 2007    BONE MARROW BIOPSY      COLONOSCOPY  2018    dr. louise    COLONOSCOPY N/A 12/22/2021    Procedure: COLONOSCOPY WITH 
POLYPECTOMIES, HOT SNARE / BIOPSY;  Surgeon: Walter Gupta MD;  Location: [facility] ENDOSCOPY;  Service: Gastroenterology;  Laterality: N/A;  DIVERTICULOSIS, COLON POLYPS    CYST REMOVAL Left 2023    Procedure: Left axillary lymph node excisional biopsy;  Surgeon: Oli Kennedy MD;  Location: [facility] OR OSC;  Service: General;  Laterality: Left;    VASECTOMY      VENOUS ACCESS DEVICE (PORT) INSERTION N/A 2023    Procedure: INSERTION VENOUS ACCESS DEVICE;  Surgeon: Oli Kennedy MD;  Location: [facility] OR OSC;  Service: General;  Laterality: N/A;    VENOUS ACCESS DEVICE (PORT) INSERTION N/A 3/6/2024    Procedure: Port-a-catheter removal and port-a-catheter placement;  Surgeon: Oli Kennedy MD;  Location: [facility] MAIN OR;  Service: General;  Laterality: N/A;     Social History     Socioeconomic History    Marital status: Single   Tobacco Use    Smoking status: Former     Current packs/day: 0.00     Average packs/day: 0.5 packs/day for 16.2 years (8.1 ttl pk-yrs)     Types: Cigarettes     Start date: 2007     Quit date: 2023     Years since quittin.9    Smokeless tobacco: Former     Types: Chew     Quit date:    Vaping Use    Vaping status: Never Used   Substance and Sexual Activity    Alcohol use: Yes     Alcohol/week: 1.0 standard drink of alcohol     Types: 1 Cans of beer per week     Comment: prvious 3 shots per week, now 1 beer per month    Drug use: Never    Sexual activity: Not Currently     Partners: Female     Birth control/protection: Condom     Family History   Problem Relation Age of Onset    Lung cancer Father     Colon cancer Maternal Grandfather     Malig Hyperthermia Neg Hx        Objective   Physical Exam  Vitals reviewed. Exam conducted with a chaperone present.   Cardiovascular:      Rate and Rhythm: Normal rate and regular rhythm.      Heart sounds: Normal heart sounds. No murmur heard.     No gallop.   Pulmonary:      Effort: Pulmonary effort is normal.    
pt received alert, on RA. non verbal. in NAD. health aide present bedside reports pt with +toleration of puree, thins baseline.
"  Breath sounds: Normal breath sounds.      Comments: Port-A-Cath  Abdominal:      General: Bowel sounds are normal.   Musculoskeletal:      Right lower leg: No edema.      Left lower leg: No edema.   Lymphadenopathy:      Cervical: No cervical adenopathy.   Psychiatric:         Mood and Affect: Mood normal.         Behavior: Behavior normal.         Vitals:    10/23/24 0827   BP: 126/88   Pulse: 75   Resp: 20   Temp: 96.8 °F (36 °C)   TempSrc: Temporal   SpO2: 100%   Weight: 84.4 kg (186 lb)   Height: 165.1 cm (65\")   PainSc: 0-No pain     ECOG score: 0         PHQ-9 Total Score:                    Result Review :   The following data was reviewed by: Boy Lock MD on 10/23/2024:  Lab Results   Component Value Date    HGB 15.6 10/23/2024    HCT 46.8 10/23/2024    MCV 87.6 10/23/2024     10/23/2024    WBC 9.87 10/23/2024    NEUTROABS 5.59 10/23/2024    LYMPHSABS 2.90 10/23/2024    MONOSABS 0.73 10/23/2024    EOSABS 0.53 (H) 10/23/2024    BASOSABS 0.03 10/23/2024     Lab Results   Component Value Date    GLUCOSE 99 10/23/2024    BUN 13 10/23/2024    CREATININE 0.85 10/23/2024     10/23/2024    K 4.0 10/23/2024     10/23/2024    CO2 24.9 10/23/2024    CALCIUM 8.9 10/23/2024    PROTEINTOT 7.1 10/23/2024    ALBUMIN 4.2 10/23/2024    BILITOT 0.5 10/23/2024    ALKPHOS 87 10/23/2024    AST 16 10/23/2024    ALT 21 10/23/2024     No results found for: \"MG\", \"PHOS\", \"FREET4\", \"TSH\"  Lab Results   Component Value Date    IRON 26 (L) 12/05/2023    LABIRON 10 (L) 12/05/2023    TRANSFERRIN 178 (L) 12/05/2023    TIBC 265 (L) 12/05/2023     Lab Results   Component Value Date     10/23/2024    FERRITIN 682.20 (H) 12/05/2023     No results found for: \"PSA\", \"CEA\", \"AFP\", \"\", \"\"          Assessment and Plan    Diagnoses and all orders for this visit:    1. Nodular sclerosis Hodgkin lymphoma of lymph nodes of multiple regions (Primary)  Assessment & Plan:  Status post treatment as outlined.  "
He is doing well.  I see no evidence of disease recurrence by his history, physical examination, lab work.  He is having continued fatigue and neuropathy in his feet.  He will be referred to therapy for evaluation.  I will see him back in 3 months for ongoing surveillance with lab work and scans prior.  Port flush routinely while not in active use.    Orders:  -     CBC & Differential; Future  -     Comprehensive Metabolic Panel; Future  -     Lactate Dehydrogenase; Future  -     Sedimentation Rate; Future  -     Cancel: Ambulatory Referral to Physical Therapy for Evaluation & Treatment  -     CT soft tissue neck w contrast; Future  -     CT chest w contrast; Future  -     CT abdomen pelvis w contrast; Future  -     Ambulatory Referral to Physical Therapy for Evaluation & Treatment            Patient Follow Up: 3 months    Patient was given instructions and counseling regarding his condition or for health maintenance advice. Please see specific information pulled into the AVS if appropriate.     Boy Lock MD    10/24/2024            
no further PO trials given.

## 2024-11-11 ENCOUNTER — OUTPATIENT (OUTPATIENT)
Dept: OUTPATIENT SERVICES | Facility: HOSPITAL | Age: 34
LOS: 1 days | End: 2024-11-11

## 2024-11-11 DIAGNOSIS — Z01.20 ENCOUNTER FOR DENTAL EXAMINATION AND CLEANING WITHOUT ABNORMAL FINDINGS: ICD-10-CM

## 2024-11-11 PROCEDURE — D9997: CPT

## 2024-11-11 PROCEDURE — D1110: CPT

## 2024-11-11 PROCEDURE — D0120: CPT

## 2024-11-14 DIAGNOSIS — Z01.21 ENCOUNTER FOR DENTAL EXAMINATION AND CLEANING WITH ABNORMAL FINDINGS: ICD-10-CM

## 2025-01-06 ENCOUNTER — APPOINTMENT (OUTPATIENT)
Dept: NEUROLOGY | Facility: CLINIC | Age: 35
End: 2025-01-06

## 2025-01-06 ENCOUNTER — APPOINTMENT (OUTPATIENT)
Dept: NEUROLOGY | Facility: CLINIC | Age: 35
End: 2025-01-06
Payer: MEDICAID

## 2025-01-06 DIAGNOSIS — R56.9 UNSPECIFIED CONVULSIONS: ICD-10-CM

## 2025-01-06 PROCEDURE — 99213 OFFICE O/P EST LOW 20 MIN: CPT

## 2025-01-06 RX ORDER — LAMOTRIGINE 50 MG/1
50 TABLET ORAL
Qty: 60 | Refills: 6 | Status: ACTIVE | COMMUNITY
Start: 2025-01-06 | End: 1900-01-01

## 2025-01-23 ENCOUNTER — OUTPATIENT (OUTPATIENT)
Dept: OUTPATIENT SERVICES | Facility: HOSPITAL | Age: 35
LOS: 1 days | End: 2025-01-23

## 2025-01-23 DIAGNOSIS — Z00.00 ENCOUNTER FOR GENERAL ADULT MEDICAL EXAMINATION WITHOUT ABNORMAL FINDINGS: ICD-10-CM

## 2025-01-23 DIAGNOSIS — G80.9 CEREBRAL PALSY, UNSPECIFIED: ICD-10-CM

## 2025-01-24 DIAGNOSIS — G80.9 CEREBRAL PALSY, UNSPECIFIED: ICD-10-CM

## 2025-02-07 ENCOUNTER — OUTPATIENT (OUTPATIENT)
Dept: OUTPATIENT SERVICES | Facility: HOSPITAL | Age: 35
LOS: 1 days | End: 2025-02-07

## 2025-02-07 DIAGNOSIS — Z01.20 ENCOUNTER FOR DENTAL EXAMINATION AND CLEANING WITHOUT ABNORMAL FINDINGS: ICD-10-CM

## 2025-02-07 PROCEDURE — D0170: CPT

## 2025-02-07 PROCEDURE — T1013: CPT

## 2025-02-07 PROCEDURE — D1208: CPT

## 2025-02-11 DIAGNOSIS — K02.9 DENTAL CARIES, UNSPECIFIED: ICD-10-CM

## 2025-06-10 ENCOUNTER — APPOINTMENT (OUTPATIENT)
Dept: OTOLARYNGOLOGY | Facility: CLINIC | Age: 35
End: 2025-06-10
Payer: MEDICAID

## 2025-06-10 VITALS — HEIGHT: 62 IN | BODY MASS INDEX: 25.76 KG/M2 | WEIGHT: 140 LBS

## 2025-06-10 PROCEDURE — 69210 REMOVE IMPACTED EAR WAX UNI: CPT

## 2025-06-11 ENCOUNTER — OUTPATIENT (OUTPATIENT)
Dept: OUTPATIENT SERVICES | Facility: HOSPITAL | Age: 35
LOS: 1 days | End: 2025-06-11
Payer: MEDICAID

## 2025-06-11 DIAGNOSIS — M62.471 CONTRACTURE OF MUSCLE, RIGHT ANKLE AND FOOT: ICD-10-CM
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
## 2025-06-11 PROCEDURE — 99203 OFFICE O/P NEW LOW 30 MIN: CPT
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
## 2025-06-12 DIAGNOSIS — M62.471 CONTRACTURE OF MUSCLE, RIGHT ANKLE AND FOOT: ICD-10-CM

## 2025-07-17 ENCOUNTER — APPOINTMENT (OUTPATIENT)
Dept: NEUROLOGY | Facility: CLINIC | Age: 35
End: 2025-07-17
Payer: MEDICAID

## 2025-07-17 ENCOUNTER — NON-APPOINTMENT (OUTPATIENT)
Age: 35
End: 2025-07-17

## 2025-07-17 PROCEDURE — 99213 OFFICE O/P EST LOW 20 MIN: CPT
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
## 2025-08-25 ENCOUNTER — OUTPATIENT (OUTPATIENT)
Dept: OUTPATIENT SERVICES | Facility: HOSPITAL | Age: 35
LOS: 1 days | End: 2025-08-25
Payer: MEDICAID

## 2025-08-25 DIAGNOSIS — Z01.20 ENCOUNTER FOR DENTAL EXAMINATION AND CLEANING WITHOUT ABNORMAL FINDINGS: ICD-10-CM

## 2025-08-25 PROCEDURE — D1208: CPT

## 2025-08-25 PROCEDURE — D1110: CPT

## 2025-08-25 PROCEDURE — D0120: CPT

## 2025-08-25 PROCEDURE — D9997: CPT
